# Patient Record
Sex: FEMALE | Race: BLACK OR AFRICAN AMERICAN | NOT HISPANIC OR LATINO | Employment: FULL TIME | URBAN - METROPOLITAN AREA
[De-identification: names, ages, dates, MRNs, and addresses within clinical notes are randomized per-mention and may not be internally consistent; named-entity substitution may affect disease eponyms.]

---

## 2017-03-05 ENCOUNTER — HOSPITAL ENCOUNTER (EMERGENCY)
Facility: HOSPITAL | Age: 47
Discharge: HOME | End: 2017-03-05
Attending: EMERGENCY MEDICINE
Payer: MEDICAID

## 2017-03-05 VITALS
DIASTOLIC BLOOD PRESSURE: 84 MMHG | TEMPERATURE: 98 F | OXYGEN SATURATION: 95 % | RESPIRATION RATE: 20 BRPM | BODY MASS INDEX: 47.09 KG/M2 | HEART RATE: 112 BPM | SYSTOLIC BLOOD PRESSURE: 176 MMHG | HEIGHT: 66 IN | WEIGHT: 293 LBS

## 2017-03-05 DIAGNOSIS — J20.9 ACUTE BRONCHITIS, UNSPECIFIED ORGANISM: Primary | ICD-10-CM

## 2017-03-05 PROCEDURE — 99283 EMERGENCY DEPT VISIT LOW MDM: CPT

## 2017-03-05 RX ORDER — ALBUTEROL SULFATE 90 UG/1
2 AEROSOL, METERED RESPIRATORY (INHALATION) EVERY 4 HOURS PRN
Qty: 1 INHALER | Refills: 0 | Status: SHIPPED | OUTPATIENT
Start: 2017-03-05 | End: 2021-11-01 | Stop reason: SDUPTHER

## 2017-03-05 RX ORDER — DOXYCYCLINE 100 MG/1
100 CAPSULE ORAL 2 TIMES DAILY
Qty: 20 CAPSULE | Refills: 0 | Status: SHIPPED | OUTPATIENT
Start: 2017-03-05 | End: 2017-03-15

## 2017-03-05 RX ORDER — PREDNISONE 20 MG/1
40 TABLET ORAL DAILY
Qty: 10 TABLET | Refills: 0 | Status: SHIPPED | OUTPATIENT
Start: 2017-03-05 | End: 2017-03-10

## 2017-03-05 RX ORDER — CODEINE PHOSPHATE AND GUAIFENESIN 10; 100 MG/5ML; MG/5ML
5 SOLUTION ORAL EVERY 6 HOURS PRN
Qty: 180 ML | Refills: 0 | Status: SHIPPED | OUTPATIENT
Start: 2017-03-05 | End: 2017-12-03

## 2017-03-05 NOTE — ED PROVIDER NOTES
SCRIBE #1 NOTE: I, Samantha Amezquita, am scribing for, and in the presence of, Ashely Muniz MD. I have scribed the entire note.      History      Chief Complaint   Patient presents with    Cough     reports having dry cough, left ear pain, sinus pressure to head        Review of patient's allergies indicates:   Allergen Reactions    Bactrim [sulfamethoxazole-trimethoprim] Hives        HPI   HPI    3/5/2017, 1:21 AM   History obtained from the patient      History of Present Illness: Laura Vora is a 46 y.o. female patient who presents to the Emergency Department for cough which onset gradually for approximately 4 days. Symptoms are constant and moderate in severity. No mitigating or exacerbating factors reported. Associated sxs include congestion, wheezing, sinus pressure, rhinorrhea, and fever. Patient denies any CP, diaphoresis, abd pain, N/V/D, and all other sxs at this time. Pt reports using inhaler. No prior Tx. No further complaints or concerns at this time.         Arrival mode: Personal vehicle    PCP: JOVON Aviles       Past Medical History:  Past Medical History:   Diagnosis Date    Diverticulitis     HTN (hypertension)     Obese     Tobacco use        Past Surgical History:  Past Surgical History:   Procedure Laterality Date    APPENDECTOMY      CHOLECYSTECTOMY      HYSTERECTOMY           Family History:  Family History   Problem Relation Age of Onset    Hypertension         Social History:  Social History     Social History Main Topics    Smoking status: Current Every Day Smoker     Packs/day: 1.00     Last attempt to quit: 1/16/2015    Smokeless tobacco: Never Used    Alcohol use Yes    Drug use: No    Sexual activity: Unknown       ROS   Review of Systems   Constitutional: Positive for fever.   HENT: Positive for congestion, rhinorrhea and sinus pressure. Negative for sore throat.    Respiratory: Positive for cough and wheezing. Negative for shortness of breath.   "  Cardiovascular: Negative for chest pain.   Gastrointestinal: Negative for abdominal pain, diarrhea, nausea and vomiting.   Genitourinary: Negative for dysuria.   Musculoskeletal: Negative for back pain.   Skin: Negative for rash.   Neurological: Negative for weakness.   Hematological: Does not bruise/bleed easily.   All other systems reviewed and are negative.      Physical Exam    Initial Vitals   BP Pulse Resp Temp SpO2   03/05/17 0016 03/05/17 0016 03/05/17 0016 03/05/17 0016 03/05/17 0016   176/84 112 20 98.2 °F (36.8 °C) 95 %      Physical Exam  Nursing Notes and Vital Signs Reviewed.  Constitutional: Patient is in no acute distress. Awake and alert. Well-developed and well-nourished.  Head: Atraumatic. Normocephalic.  Eyes: PERRL. EOM intact. Conjunctivae are not pale. No scleral icterus.  ENT: Mucous membranes are moist. Oropharynx is clear and symmetric.    Neck: Supple. Full ROM. No lymphadenopathy.  Cardiovascular: Tachycardic. Regular rhythm. No murmurs, rubs, or gallops. Distal pulses are 2+ and symmetric.  Pulmonary/Chest: No respiratory distress. Clear to auscultation bilaterally. No rales or rhonchi. Slight wheezing.  Abdominal: Soft and non-distended.  There is no tenderness.  No rebound, guarding, or rigidity. Good bowel sounds.  Genitourinary: No CVA tenderness  Musculoskeletal: Moves all extremities. No obvious deformities. No edema. No calf tenderness.  Skin: Warm and dry.  Neurological:  Alert, awake, and appropriate.  Normal speech.  No acute focal neurological deficits are appreciated.  Psychiatric: Normal affect. Good eye contact. Appropriate in content.    ED Course    Procedures  ED Vital Signs:  Vitals:    03/05/17 0016   BP: (!) 176/84   Pulse: (!) 112   Resp: 20   Temp: 98.2 °F (36.8 °C)   TempSrc: Oral   SpO2: 95%   Weight: 136.1 kg (300 lb)   Height: 5' 6" (1.676 m)              The Emergency Provider reviewed the vital signs and test results, which are outlined above.    ED " Discussion     1:25 AM: Discussed with pt all pertinent ED information and results. Discussed pt dx and plan of tx. Gave pt all f/u and return to the ED instructions. All questions and concerns were addressed at this time. Pt expresses understanding of information and instructions, and is comfortable with plan to discharge. Pt is stable for discharge.    Driving or other activities under influence of medications - Patient and/or family/caretaker was given a prescription for, or instructed to use a medicine that may impair ability to drive, operate machinery, or participate in other potentially dangerous activities.  Patient was instructed not to participate in these activities while under the influence of these medications.        ED Medication(s):  Medications - No data to display    Discharge Medication List as of 3/5/2017  1:22 AM      START taking these medications    Details   albuterol 90 mcg/actuation inhaler Inhale 2 puffs into the lungs every 4 (four) hours as needed for Wheezing., Starting 3/5/2017, Until Mon 3/5/18, Print      doxycycline (VIBRAMYCIN) 100 MG Cap Take 1 capsule (100 mg total) by mouth 2 (two) times daily., Starting 3/5/2017, Until Wed 3/15/17, Print      predniSONE (DELTASONE) 20 MG tablet Take 2 tablets (40 mg total) by mouth once daily., Starting 3/5/2017, Until Fri 3/10/17, Print             Follow-up Information     Follow up with JOVON Aviles In 2 days.    Specialty:  Family Medicine    Contact information:    [address]  [place] [address]  [phone]          Follow up with [facility] .    Specialty:  Emergency Medicine    Why:  As needed, If symptoms worsen    Contact information:    [address]  [facility] [address] [phone]            Medical Decision Making              Scribe Attestation:   Scribe #1: I performed the above scribed service and the documentation accurately describes the  services I performed. I attest to the accuracy of the note.    Attending:   Physician Attestation Statement for Scribe #1: I, Ashely Muniz MD, personally performed the services described in this documentation, as scribed by Samantha Amezquita, in my presence, and it is both accurate and complete.          Clinical Impression       ICD-10-CM ICD-9-CM   1. Acute bronchitis, unspecified organism J20.9 466.0       Disposition:   Disposition: Discharged  Condition: Stable         Ashely Muniz MD  03/05/17 0605

## 2017-03-05 NOTE — ED AVS SNAPSHOT
[facility] -   [id number] [place] [id number]               Laura Vora   3/5/2017 12:01 AM   ED    Description:  Female : 1970   Department:  [facility] -            Your Care was Coordinated By:     Provider Role From To    Ashely Muniz MD Attending Provider 17 0121 --      Reason for Visit     Cough           Diagnoses this Visit        Comments    Acute bronchitis, unspecified organism    -  Primary       ED Disposition     ED Disposition Condition Comment    Discharge             To Do List           Follow-up Information     Follow up with JOVON Aviles In 2 days.    Specialty:  Family Medicine    Contact information:    [address] [facility]  [place] [address]  [phone]          Follow up with [facility].    Specialty:  Emergency Medicine    Why:  As needed, If symptoms worsen    Contact information:    [address] [place] [id number] [phone]       These Medications        Disp Refills Start End    doxycycline (VIBRAMYCIN) 100 MG Cap 20 capsule 0 3/5/2017 3/15/2017    Take 1 capsule (100 mg total) by mouth 2 (two) times daily. - Oral    predniSONE (DELTASONE) 20 MG tablet 10 tablet 0 3/5/2017 3/10/2017    Take 2 tablets (40 mg total) by mouth once daily. - Oral    albuterol 90 mcg/actuation inhaler 1 Inhaler 0 3/5/2017 3/5/2018    Inhale 2 puffs into the lungs every 4 (four) hours as needed for Wheezing. - Inhalation      [facility]     [facility] -  Assistance  Registered nurses in the [facility] provide clinical advisement, health education, appointment booking, and other advisory services.  Call for this free service at [phone].             Medications           Message regarding Medications     Verify the changes and/or additions to your medication regime listed below are the same as  "discussed with your clinician today.  If any of these changes or additions are incorrect, please notify your healthcare provider.        START taking these NEW medications        Refills    doxycycline (VIBRAMYCIN) 100 MG Cap 0    Sig: Take 1 capsule (100 mg total) by mouth 2 (two) times daily.    Class: Print    Route: Oral    predniSONE (DELTASONE) 20 MG tablet 0    Sig: Take 2 tablets (40 mg total) by mouth once daily.    Class: Print    Route: Oral    albuterol 90 mcg/actuation inhaler 0    Sig: Inhale 2 puffs into the lungs every 4 (four) hours as needed for Wheezing.    Class: Print    Route: Inhalation      STOP taking these medications     hydrocodone-acetaminophen 10-325mg (NORCO)  mg Tab Take 1 tablet by mouth every 4 (four) hours as needed.           Verify that the below list of medications is an accurate representation of the medications you are currently taking.  If none reported, the list may be blank. If incorrect, please contact your healthcare provider. Carry this list with you in case of emergency.           Current Medications     albuterol 90 mcg/actuation inhaler Inhale 2 puffs into the lungs every 4 (four) hours as needed for Wheezing.    doxycycline (VIBRAMYCIN) 100 MG Cap Take 1 capsule (100 mg total) by mouth 2 (two) times daily.    lisinopril (PRINIVIL,ZESTRIL) 40 MG tablet Take 40 mg by mouth once daily.    ondansetron (ZOFRAN) 4 MG tablet Take 1 tablet (4 mg total) by mouth every 8 (eight) hours as needed.    predniSONE (DELTASONE) 20 MG tablet Take 2 tablets (40 mg total) by mouth once daily.           Clinical Reference Information           Your Vitals Were     BP Pulse Temp Resp Height Weight    176/84 (BP Location: Right arm, Patient Position: Sitting) 112 98.2 °F (36.8 °C) (Oral) 20 5' 6" (1.676 m) 136.1 kg (300 lb)    Last Period SpO2 BMI          09/05/2014 (Exact Date) 95% 48.42 kg/m2        Allergies as of 3/5/2017        Reactions    Bactrim " [Sulfamethoxazole-trimethoprim] Hives      Immunizations Administered on Date of Encounter - 3/5/2017     None      ED Micro, Lab, POCT     None      ED Imaging Orders     None        Discharge Instructions         Bronchitis, Antibiotic Treatment (Adult)    Bronchitis is an infection of the air passages (bronchial tubes) in your lungs. It often occurs when you have a cold. This illness is contagious during the first few days and is spread through the air by coughing and sneezing, or by direct contact (touching the sick person and then touching your own eyes, nose, or mouth).  Symptoms of bronchitis include cough with mucus (phlegm) and low-grade fever. Bronchitis usually lasts 7 to 14 days. Mild cases can be treated with simple home remedies. More severe infection is treated with an antibiotic.  Home care  Follow these guidelines when caring for yourself at home:  · If your symptoms are severe, rest at home for the first 2 to 3 days. When you go back to your usual activities, don't let yourself get too tired.  · Do not smoke. Also avoid being exposed to secondhand smoke.  · You may use over-the-counter medicines to control fever or pain, unless another medicine was prescribed. (Note: If you have chronic liver or kidney disease or have ever had a stomach ulcer or gastrointestinal bleeding, talk with your healthcare provider before using these medicines. Also talk to your provider if you are taking medicine to prevent blood clots.) Aspirin should never be given to anyone younger than 18 years of age who is ill with a viral infection or fever. It may cause severe liver or brain damage.  · Your appetite may be poor, so a light diet is fine. Avoid dehydration by drinking 6 to 8 glasses of fluids per day (such as water, soft drinks, sports drinks, juices, tea, or soup). Extra fluids will help loosen secretions in the nose and lungs.  · Over-the-counter cough, cold, and sore-throat medicines will not shorten the length of  the illness, but they may be helpful to reduce symptoms. (Note: Do not use decongestants if you have high blood pressure.)  · Finish all antibiotic medicine. Do this even if you are feeling better after only a few days.  Follow-up care  Follow up with your healthcare provider, or as advised. If you had an X-ray or ECG (electrocardiogram), a specialist will review it. You will be notified of any new findings that may affect your care.  Note: If you are age 65 or older, or if you have a chronic lung disease or condition that affects your immune system, or you smoke, talk to your healthcare provider about having pneumococcal vaccinations and a yearly influenza vaccination (flu shot).  When to seek medical advice  Call your healthcare provider right away if any of these occur:  · Fever of 100.4°F (38°C) or higher  · Coughing up increased amounts of colored sputum  · Weakness, drowsiness, headache, facial pain, ear pain, or a stiff neck  Call 911, or get immediate medical care  Contact emergency services right away if any of these occur.  · Coughing up blood  · Worsening weakness, drowsiness, headache, or stiff neck  · Trouble breathing, wheezing, or pain with breathing  Date Last Reviewed: 9/13/2015  © 0699-4732 [employer]. [address] [place] [address], [place] [address]. All rights reserved. This information is not intended as a substitute for professional medical care. Always follow your healthcare professional's instructions.          Bronchitis with Wheezing (Viral or Bacterial: Adult)    Bronchitis is an infection of the air passages. It often occurs during a cold and is usually caused by a virus. Symptoms include cough with mucus (phlegm) and low-grade fever. This illness is contagious during the first few days and is spread through the air by coughing and sneezing, or by direct contact (touching the sick person and then touching your own eyes, nose, or mouth).  If there is a lot of inflammation, air flow  is restricted. The air passages may also go into spasm, especially if you have asthma. This causes wheezing and difficulty breathing even in people who do not have asthma.  Bronchitis usually lasts 7 to 14 days. The wheezing should improve with treatment during the first week. An inhaler is often prescribed to relax the air passages and stop wheezing. Antibiotics will be prescribed if your doctor thinks there is also a secondary bacterial infection.  Home care  · If symptoms are severe, rest at home for the first 2 to 3 days. When you go back to your usual activities, don't let yourself get too tired.  · Do not smoke. Also avoid being exposed to secondhand smoke.  · You may use over-the-counter medicine to control fever or pain, unless another medicine was prescribed. Note: If you have chronic liver or kidney disease or have ever had a stomach ulcer or gastrointestinal bleeding, talk with your healthcare provider before using these medicines. Also talk to your provider if you are taking medicine to prevent blood clots.) Aspirin should never be given to anyone younger than 18 years of age who is ill with a viral infection or fever. It may cause severe liver or brain damage.  · Your appetite may be poor, so a light diet is fine. Avoid dehydration by drinking 6 to 8 glasses of fluids per day (such as water, soft drinks, sports drinks, juices, tea, or soup). Extra fluids will help loosen secretions in the nose and lungs.  · Over-the-counter cough, cold, and sore-throat medicines will not shorten the length of the illness, but they may be helpful to reduce symptoms. (Note: Do not use decongestants if you have high blood pressure.)  · If you were given an inhaler, use it exactly as directed. If you need to use it more often than prescribed, your condition may be worsening. If this happens, contact your healthcare provider.  · If prescribed, finish all antibiotic medicine, even if you are feeling better after only a few  days.  Follow-up care  Follow up with your healthcare provider, or as advised. If you had an X-ray or ECG (electrocardiogram), a specialist will review it. You will be notified of any new findings that may affect your care.  Note: If you are age 65 or older, or if you have a chronic lung disease or condition that affects your immune system, or you smoke, talk to your healthcare provider about having a pneumococcal vaccinations and a yearly influenza vaccination (flu shot).  When to seek medical advice  Call your healthcare provider right away if any of these occur:  · Fever of 100.4°F (38°C) or higher  · Coughing up increasing amounts of colored sputum  · Weakness, drowsiness, headache, facial pain, ear pain, or a stiff neck  Call 911, or get immediate medical care  Contact emergency services right away if any of these occur.  · Coughing up blood  · Worsening weakness, drowsiness, headache, or stiff neck  · Increased wheezing not helped with medication, shortness of breath, or pain with breathing  Date Last Reviewed: 9/13/2015  © 7540-5765 [employer]. [address] [place] [address]. All rights reserved. This information is not intended as a substitute for professional medical care. Always follow your healthcare professional's instructions.          [facility] Sign-Up     Activating your [facility] account is as easy as 1-2-3!     1) Visit [email or web address], select Sign Up Now, enter this activation code and your date of birth, then select Next.  [email or web address]  Expires: 4/19/2017  1:22 AM      2) Create a username and password to use when you visit [facility] in the future and select a security question in case you lose your password and select Next.    3) Enter your e-mail address and click Sign Up!    Additional Information  If you have questions, please e-mail [email or web address] or call [phone] to talk to our [facility] staff. Remember, [facility] is NOT to be used for urgent needs.  For medical emergencies, dial 911.         Smoking Cessation     If you would like to quit smoking:   You may be eligible for free services if you are a [place] resident and started smoking cigarettes before September 1, 1988.  Call the Smoking Cessation [employer] (SCT) toll free at (379) 022-5784 or (273) 741-2322.   Call 1-800-QUIT-NOW if you do not meet the above criteria.             [facility] complies with applicable Federal civil rights laws and does not discriminate on the basis of race, color, national origin, age, disability, or sex.        Language Assistance Services     ATTENTION: Language assistance services are available, free of charge. Please call [phone].      ATENCIÓN: Si habla español, tiene a glover disposición servicios gratuitos de asistencia lingüística. Llame al [phone].     CHÚ Ý: N?u b?n nói Ti?ng Vi?t, có các d?ch v? h? tr? ngôn ng? mi?n phí dành cho b?n. G?i s? [phone].

## 2017-03-05 NOTE — DISCHARGE INSTRUCTIONS
Bronchitis, Antibiotic Treatment (Adult)    Bronchitis is an infection of the air passages (bronchial tubes) in your lungs. It often occurs when you have a cold. This illness is contagious during the first few days and is spread through the air by coughing and sneezing, or by direct contact (touching the sick person and then touching your own eyes, nose, or mouth).  Symptoms of bronchitis include cough with mucus (phlegm) and low-grade fever. Bronchitis usually lasts 7 to 14 days. Mild cases can be treated with simple home remedies. More severe infection is treated with an antibiotic.  Home care  Follow these guidelines when caring for yourself at home:  · If your symptoms are severe, rest at home for the first 2 to 3 days. When you go back to your usual activities, don't let yourself get too tired.  · Do not smoke. Also avoid being exposed to secondhand smoke.  · You may use over-the-counter medicines to control fever or pain, unless another medicine was prescribed. (Note: If you have chronic liver or kidney disease or have ever had a stomach ulcer or gastrointestinal bleeding, talk with your healthcare provider before using these medicines. Also talk to your provider if you are taking medicine to prevent blood clots.) Aspirin should never be given to anyone younger than 18 years of age who is ill with a viral infection or fever. It may cause severe liver or brain damage.  · Your appetite may be poor, so a light diet is fine. Avoid dehydration by drinking 6 to 8 glasses of fluids per day (such as water, soft drinks, sports drinks, juices, tea, or soup). Extra fluids will help loosen secretions in the nose and lungs.  · Over-the-counter cough, cold, and sore-throat medicines will not shorten the length of the illness, but they may be helpful to reduce symptoms. (Note: Do not use decongestants if you have high blood pressure.)  · Finish all antibiotic medicine. Do this even if you are feeling better after only a  few days.  Follow-up care  Follow up with your healthcare provider, or as advised. If you had an X-ray or ECG (electrocardiogram), a specialist will review it. You will be notified of any new findings that may affect your care.  Note: If you are age 65 or older, or if you have a chronic lung disease or condition that affects your immune system, or you smoke, talk to your healthcare provider about having pneumococcal vaccinations and a yearly influenza vaccination (flu shot).  When to seek medical advice  Call your healthcare provider right away if any of these occur:  · Fever of 100.4°F (38°C) or higher  · Coughing up increased amounts of colored sputum  · Weakness, drowsiness, headache, facial pain, ear pain, or a stiff neck  Call 911, or get immediate medical care  Contact emergency services right away if any of these occur.  · Coughing up blood  · Worsening weakness, drowsiness, headache, or stiff neck  · Trouble breathing, wheezing, or pain with breathing  Date Last Reviewed: 9/13/2015 © 2000-2016 [employer]. [address] [place] [address]. All rights reserved. This information is not intended as a substitute for professional medical care. Always follow your healthcare professional's instructions.          Bronchitis with Wheezing (Viral or Bacterial: Adult)    Bronchitis is an infection of the air passages. It often occurs during a cold and is usually caused by a virus. Symptoms include cough with mucus (phlegm) and low-grade fever. This illness is contagious during the first few days and is spread through the air by coughing and sneezing, or by direct contact (touching the sick person and then touching your own eyes, nose, or mouth).  If there is a lot of inflammation, air flow is restricted. The air passages may also go into spasm, especially if you have asthma. This causes wheezing and difficulty breathing even in people who do not have asthma.  Bronchitis usually lasts 7 to 14  days. The wheezing should improve with treatment during the first week. An inhaler is often prescribed to relax the air passages and stop wheezing. Antibiotics will be prescribed if your doctor thinks there is also a secondary bacterial infection.  Home care  · If symptoms are severe, rest at home for the first 2 to 3 days. When you go back to your usual activities, don't let yourself get too tired.  · Do not smoke. Also avoid being exposed to secondhand smoke.  · You may use over-the-counter medicine to control fever or pain, unless another medicine was prescribed. Note: If you have chronic liver or kidney disease or have ever had a stomach ulcer or gastrointestinal bleeding, talk with your healthcare provider before using these medicines. Also talk to your provider if you are taking medicine to prevent blood clots.) Aspirin should never be given to anyone younger than 18 years of age who is ill with a viral infection or fever. It may cause severe liver or brain damage.  · Your appetite may be poor, so a light diet is fine. Avoid dehydration by drinking 6 to 8 glasses of fluids per day (such as water, soft drinks, sports drinks, juices, tea, or soup). Extra fluids will help loosen secretions in the nose and lungs.  · Over-the-counter cough, cold, and sore-throat medicines will not shorten the length of the illness, but they may be helpful to reduce symptoms. (Note: Do not use decongestants if you have high blood pressure.)  · If you were given an inhaler, use it exactly as directed. If you need to use it more often than prescribed, your condition may be worsening. If this happens, contact your healthcare provider.  · If prescribed, finish all antibiotic medicine, even if you are feeling better after only a few days.  Follow-up care  Follow up with your healthcare provider, or as advised. If you had an X-ray or ECG (electrocardiogram), a specialist will review it. You will be notified of any new findings that may  affect your care.  Note: If you are age 65 or older, or if you have a chronic lung disease or condition that affects your immune system, or you smoke, talk to your healthcare provider about having a pneumococcal vaccinations and a yearly influenza vaccination (flu shot).  When to seek medical advice  Call your healthcare provider right away if any of these occur:  · Fever of 100.4°F (38°C) or higher  · Coughing up increasing amounts of colored sputum  · Weakness, drowsiness, headache, facial pain, ear pain, or a stiff neck  Call 911, or get immediate medical care  Contact emergency services right away if any of these occur.  · Coughing up blood  · Worsening weakness, drowsiness, headache, or stiff neck  · Increased wheezing not helped with medication, shortness of breath, or pain with breathing  Date Last Reviewed: 9/13/2015  © 7120-3931 [employer] [address] [place] [address]. All rights reserved. This information is not intended as a substitute for professional medical care. Always follow your healthcare professional's instructions.

## 2017-07-08 ENCOUNTER — HOSPITAL ENCOUNTER (EMERGENCY)
Facility: HOSPITAL | Age: 47
Discharge: HOME | End: 2017-07-08
Attending: EMERGENCY MEDICINE
Payer: MEDICAID

## 2017-07-08 VITALS
RESPIRATION RATE: 18 BRPM | TEMPERATURE: 99 F | OXYGEN SATURATION: 99 % | DIASTOLIC BLOOD PRESSURE: 79 MMHG | BODY MASS INDEX: 46.77 KG/M2 | SYSTOLIC BLOOD PRESSURE: 148 MMHG | HEART RATE: 89 BPM | WEIGHT: 291 LBS | HEIGHT: 66 IN

## 2017-07-08 DIAGNOSIS — H66.002 ACUTE SUPPURATIVE OTITIS MEDIA OF LEFT EAR WITHOUT SPONTANEOUS RUPTURE OF TYMPANIC MEMBRANE, RECURRENCE NOT SPECIFIED: Primary | ICD-10-CM

## 2017-07-08 PROCEDURE — 99283 EMERGENCY DEPT VISIT LOW MDM: CPT | Mod: 25

## 2017-07-08 PROCEDURE — 25000003 PHARM REV CODE 250: Performed by: EMERGENCY MEDICINE

## 2017-07-08 PROCEDURE — 63600175 PHARM REV CODE 636 W HCPCS: Performed by: EMERGENCY MEDICINE

## 2017-07-08 PROCEDURE — 96372 THER/PROPH/DIAG INJ SC/IM: CPT

## 2017-07-08 RX ORDER — TRAMADOL HYDROCHLORIDE 50 MG/1
50 TABLET ORAL EVERY 6 HOURS PRN
Qty: 12 TABLET | Refills: 0 | Status: SHIPPED | OUTPATIENT
Start: 2017-07-08 | End: 2017-07-18

## 2017-07-08 RX ORDER — CEFTRIAXONE 1 G/1
1 INJECTION, POWDER, FOR SOLUTION INTRAMUSCULAR; INTRAVENOUS
Status: COMPLETED | OUTPATIENT
Start: 2017-07-08 | End: 2017-07-08

## 2017-07-08 RX ORDER — AMOXICILLIN AND CLAVULANATE POTASSIUM 875; 125 MG/1; MG/1
1 TABLET, FILM COATED ORAL 2 TIMES DAILY
Qty: 14 TABLET | Refills: 0 | Status: SHIPPED | OUTPATIENT
Start: 2017-07-08 | End: 2017-11-05 | Stop reason: ALTCHOICE

## 2017-07-08 RX ORDER — HYDROCODONE BITARTRATE AND ACETAMINOPHEN 10; 325 MG/1; MG/1
1 TABLET ORAL
Status: COMPLETED | OUTPATIENT
Start: 2017-07-08 | End: 2017-07-08

## 2017-07-08 RX ADMIN — CEFTRIAXONE 1 G: 1 INJECTION, POWDER, FOR SOLUTION INTRAMUSCULAR; INTRAVENOUS at 09:07

## 2017-07-08 RX ADMIN — HYDROCODONE BITARTRATE AND ACETAMINOPHEN 1 TABLET: 10; 325 TABLET ORAL at 09:07

## 2017-07-09 NOTE — ED PROVIDER NOTES
SCRIBE #1 NOTE: I, Chuck Yung, am scribing for, and in the presence of, Jean Carlos Narvaez Jr., MD. I have scribed the entire note.      History      Chief Complaint   Patient presents with    Headache     left sided headache x 6 hrs with URI symptoms        Review of patient's allergies indicates:   Allergen Reactions    Bactrim [sulfamethoxazole-trimethoprim] Hives        HPI   HPI    7/8/2017, 8:59 PM   History obtained from the patient      History of Present Illness: Laura Vora is a 47 y.o. female patient who presents to the Emergency Department for left sided ear pain which onset gradually 6 hours ago. Symptoms are constant and moderate in severity.  No mitigating or exacerbating factors reported. Associated sxs include left sided HA, cough, and epistaxis. Patient denies any fever, chills, n/v/d, sore throat, rhinorrhea, congestion, lightheadedness, dizziness, and all other sxs at this time. No further complaints or concerns at this time.         Arrival mode: Personal vehicle      PCP: JOVON Aviles       Past Medical History:  Past Medical History:   Diagnosis Date    Diverticulitis     HTN (hypertension)     Obese     Tobacco use        Past Surgical History:  Past Surgical History:   Procedure Laterality Date    APPENDECTOMY      CHOLECYSTECTOMY      HYSTERECTOMY           Family History:  Family History   Problem Relation Age of Onset    Hypertension         Social History:  Social History     Social History Main Topics    Smoking status: Current Every Day Smoker     Packs/day: 1.00     Last attempt to quit: 1/16/2015    Smokeless tobacco: Never Used    Alcohol use Yes    Drug use: No    Sexual activity: Unknown       ROS   Review of Systems   Constitutional: Negative for chills and fever.   HENT: Positive for ear pain (left) and nosebleeds. Negative for congestion, sore throat and trouble swallowing.    Respiratory: Positive for cough. Negative for shortness of breath.     Cardiovascular: Negative for chest pain.   Gastrointestinal: Negative for diarrhea, nausea and vomiting.   Genitourinary: Negative for dysuria.   Musculoskeletal: Negative for back pain.   Skin: Negative for rash.   Neurological: Positive for headaches (left sided). Negative for dizziness, weakness and light-headedness.   Hematological: Does not bruise/bleed easily.       Physical Exam      Initial Vitals [07/08/17 2052]   BP Pulse Resp Temp SpO2   (!) 159/89 110 20 98.5 °F (36.9 °C) 100 %      MAP       112.33          Physical Exam  Nursing Notes and Vital Signs Reviewed.  Constitutional: Patient is in no acute distress. Well-developed and well-nourished.  Head: Atraumatic. Normocephalic.  Eyes: PERRL. EOM intact. Conjunctivae are not pale. No scleral icterus.  Ears: Right TM normal. Erythema to the left TM. No bulging. No effusion or air-fluid levels. No perforation.   Nose: Patent nares. Turbinates are normal. No drainage.   Throat: Moist mucous membranes. Posterior oropharynx is symmetric without erythema. Tonsillar exudate is not present. No trismus. Normal handling of secretions. No stridor.   Neck: Supple. Full ROM. No lymphadenopathy.  Cardiovascular: Regular rate. Regular rhythm. No murmurs, rubs, or gallops. Distal pulses are 2+ and symmetric.  Pulmonary/Chest: No respiratory distress. Clear to auscultation bilaterally. No wheezing, rales, or rhonchi.  Abdominal: Soft and non-distended.  There is no tenderness.  No rebound, guarding, or rigidity.   Musculoskeletal: Moves all extremities. No obvious deformities. No edema.   Skin: Warm and dry.  Neurological:  Alert, awake, and appropriate.  Normal speech.  No acute focal neurological deficits are appreciated.  Psychiatric: Normal affect. Good eye contact. Appropriate in content.    ED Course    Procedures  ED Vital Signs:  Vitals:    07/08/17 2052 07/08/17 2140   BP: (!) 159/89 (!) 148/79   Pulse: 110 89   Resp: 20 18   Temp: 98.5 °F (36.9 °C)   "  TempSrc: Oral    SpO2: 100% 99%   Weight: 132 kg (291 lb)    Height: 5' 6" (1.676 m)                 The Emergency Provider reviewed the vital signs and test results, which are outlined above.    ED Discussion     9:12 PM: Discussed pt dx and plan of tx. Gave pt all f/u and return to the ED instructions. All questions and concerns were addressed at this time. Pt expresses understanding of information and instructions, and is comfortable with plan to discharge. Pt is stable for discharge.        ED Medication(s):  Medications   cefTRIAXone injection 1 g (1 g Intramuscular Given 7/8/17 2114)   hydrocodone-acetaminophen 10-325mg per tablet 1 tablet (1 tablet Oral Given 7/8/17 2114)       Discharge Medication List as of 7/8/2017  9:12 PM      START taking these medications    Details   amoxicillin-clavulanate 875-125mg (AUGMENTIN) 875-125 mg per tablet Take 1 tablet by mouth 2 (two) times daily., Starting Sat 7/8/2017, Print      tramadol (ULTRAM) 50 mg tablet Take 1 tablet (50 mg total) by mouth every 6 (six) hours as needed., Starting Sat 7/8/2017, Until Tue 7/18/2017, Print             Follow-up Information     Cy Romero, APRN. Call in 2 days.    Specialty:  Family Medicine  Contact information:  [address] [facility]  [place] [address]  [phone]                     Medical Decision Making              Scribe Attestation:   Scribe #1: I performed the above scribed service and the documentation accurately describes the services I performed. I attest to the accuracy of the note.    Attending:   Physician Attestation Statement for Scribe #1: I, Jean Carlos Narvaez Jr., MD, personally performed the services described in this documentation, as scribed by Chuck Yung, in my presence, and it is both accurate and complete.          Clinical Impression       ICD-10-CM ICD-9-CM   1. Acute suppurative otitis media of left ear without spontaneous rupture of tympanic membrane, recurrence not " specified H66.002 382.00       Disposition:   Disposition: Discharged  Condition: Stable         Jean Carlos Narvaez Jr., MD  07/09/17 0300

## 2017-09-07 ENCOUNTER — HOSPITAL ENCOUNTER (EMERGENCY)
Facility: HOSPITAL | Age: 47
Discharge: HOME | End: 2017-09-07
Payer: MEDICAID

## 2017-09-07 VITALS
WEIGHT: 291 LBS | HEART RATE: 120 BPM | DIASTOLIC BLOOD PRESSURE: 67 MMHG | HEIGHT: 65 IN | OXYGEN SATURATION: 98 % | RESPIRATION RATE: 18 BRPM | BODY MASS INDEX: 48.48 KG/M2 | TEMPERATURE: 99 F | SYSTOLIC BLOOD PRESSURE: 143 MMHG

## 2017-09-07 DIAGNOSIS — R20.8 PAIN OF SKIN: ICD-10-CM

## 2017-09-07 DIAGNOSIS — L03.311 CELLULITIS OF ABDOMINAL WALL: Primary | ICD-10-CM

## 2017-09-07 PROCEDURE — 99283 EMERGENCY DEPT VISIT LOW MDM: CPT

## 2017-09-07 RX ORDER — CLINDAMYCIN HYDROCHLORIDE 150 MG/1
300 CAPSULE ORAL EVERY 8 HOURS
Qty: 42 CAPSULE | Refills: 0 | Status: SHIPPED | OUTPATIENT
Start: 2017-09-07 | End: 2017-09-14

## 2017-09-07 NOTE — ED PROVIDER NOTES
History      Chief Complaint   Patient presents with    Abscess     R abd       Review of patient's allergies indicates:   Allergen Reactions    Bactrim [sulfamethoxazole-trimethoprim] Hives        HPI   HPI    9/7/2017, 12:45 PM   History obtained from the patient      History of Present Illness: Laura Vora is a 47 y.o. female patient who presents to the Emergency Department for pain over old laparscopy scar fro 2 days.  She says it was red but redness has gone away.  She denies f/n/v.   Symptoms are moderate in severity.     No further complaints or concerns at this time.           PCP: JOVON Aviles       Past Medical History:  Past Medical History:   Diagnosis Date    Diverticulitis     HTN (hypertension)     Obese     Tobacco use          Past Surgical History:  Past Surgical History:   Procedure Laterality Date    APPENDECTOMY      CHOLECYSTECTOMY      HYSTERECTOMY             Family History:  Family History   Problem Relation Age of Onset    Hypertension             Social History:  Social History     Social History Main Topics    Smoking status: Current Every Day Smoker     Packs/day: 1.00     Last attempt to quit: 1/16/2015    Smokeless tobacco: Never Used    Alcohol use Yes    Drug use: No    Sexual activity: Not on file       ROS     Review of Systems   Constitutional: Negative for fever.   HENT: Negative for sore throat.    Respiratory: Negative for shortness of breath.    Cardiovascular: Negative for chest pain.   Gastrointestinal: Negative for nausea.   Genitourinary: Negative for dysuria.   Musculoskeletal: Negative for back pain.   Skin: Negative for rash.   Neurological: Negative for weakness.   Hematological: Does not bruise/bleed easily.   All other systems reviewed and are negative.      Physical Exam      Initial Vitals [09/07/17 1233]   BP Pulse Resp Temp SpO2   (!) 143/67 (!) 120 18 98.7 °F (37.1 °C) 98 %      MAP       92.33         Physical Exam  Vital signs  "and nursing notes reviewed.  Constitutional: Patient is in NAD. Awake and alert. Well-developed and well-nourished.  Head: Atraumatic. Normocephalic.  Eyes: PERRL. EOM intact. Conjunctivae nl. No scleral icterus.  ENT: Mucous membranes are moist. Oropharynx is clear.  Neck: Supple. No JVD. No lymphadenopathy.  No meningismus  Cardiovascular: Regular rate and rhythm. No murmurs, rubs, or gallops. Distal pulses are 2+ and symmetric.  Pulmonary/Chest: No respiratory distress. Clear to auscultation bilaterally. No wheezing, rales, or rhonchi.  Abdominal: Soft. Non-distended. No TTP. No rebound, guarding, or rigidity. Good bowel sounds.  Genitourinary: No CVA tenderness  Musculoskeletal: Moves all extremities. No edema.   Skin: Warm and dry.  Right lower laparoscopy scar, looks healthy, slightly erythematous but possibly from patient rubbing and squeezing it during exam, no induration, fluctuance or heat.  Neurological: Awake and alert. No acute focal neurological deficits are appreciated.  Psychiatric: Normal affect. Good eye contact. Appropriate in content.      ED Course          Procedures  ED Vital Signs:  Vitals:    09/07/17 1233   BP: (!) 143/67   Pulse: (!) 120   Resp: 18   Temp: 98.7 °F (37.1 °C)   TempSrc: Oral   SpO2: 98%   Weight: 132 kg (291 lb)   Height: 5' 5" (1.651 m)                 Imaging Results:  Imaging Results    None            The Emergency Provider reviewed the vital signs and test results, which are outlined above.    ED Discussion             Medication(s) given in the ER:  Medications - No data to display        Follow-up Information     JOVON Aviles In 2 days.    Specialty:  Family Medicine  Contact information:  [address] [facility]  [place] [id number] [phone]             [facility]    Specialty:  Emergency Medicine  Why:  If symptoms worsen  Contact information:  [address]  [facility] " [address]  [phone]                     New Prescriptions    CLINDAMYCIN (CLEOCIN) 150 MG CAPSULE    Take 2 capsules (300 mg total) by mouth every 8 (eight) hours.          Medical Decision Making      Pt requests work excuse.  Instructed to rter if any signs of abscess or cellulitis.    All findings were reviewed with the patient/family in detail.   All remaining questions and concerns were addressed at that time.  Patient/family has been counseled regarding the need for follow-up as well as the indication to return to the emergency room should new or worrisome developments occur.        MDM               Clinical Impression:        ICD-10-CM ICD-9-CM   1. Cellulitis of abdominal wall L03.311 682.2   2. Pain of skin R20.8 782.0             Anitha Tran PA-C  09/07/17 1251

## 2017-10-01 ENCOUNTER — HOSPITAL ENCOUNTER (EMERGENCY)
Facility: HOSPITAL | Age: 47
Discharge: HOME | End: 2017-10-01
Payer: MEDICAID

## 2017-10-01 VITALS
TEMPERATURE: 99 F | DIASTOLIC BLOOD PRESSURE: 78 MMHG | RESPIRATION RATE: 18 BRPM | HEIGHT: 65 IN | BODY MASS INDEX: 48.82 KG/M2 | SYSTOLIC BLOOD PRESSURE: 138 MMHG | OXYGEN SATURATION: 100 % | WEIGHT: 293 LBS | HEART RATE: 100 BPM

## 2017-10-01 DIAGNOSIS — S89.309S: ICD-10-CM

## 2017-10-01 DIAGNOSIS — S62.024K CLOSED NONDISPLACED FRACTURE OF MIDDLE THIRD OF SCAPHOID OF RIGHT WRIST WITH NONUNION, SUBSEQUENT ENCOUNTER: ICD-10-CM
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
## 2017-10-01 PROCEDURE — 29515 APPLICATION SHORT LEG SPLINT: CPT | Mod: 59,LT

## 2017-10-01 PROCEDURE — 90471 IMMUNIZATION ADMIN: CPT | Performed by: REGISTERED NURSE

## 2017-10-01 PROCEDURE — 99283 EMERGENCY DEPT VISIT LOW MDM: CPT | Mod: 25

## 2017-10-01 PROCEDURE — 29125 APPL SHORT ARM SPLINT STATIC: CPT | Mod: LT

## 2017-10-01 PROCEDURE — 90715 TDAP VACCINE 7 YRS/> IM: CPT | Performed by: REGISTERED NURSE

## 2017-10-01 PROCEDURE — 63600175 PHARM REV CODE 636 W HCPCS: Performed by: REGISTERED NURSE

## 2017-10-01 RX ORDER — IBUPROFEN 800 MG/1
800 TABLET ORAL EVERY 6 HOURS PRN
Qty: 20 TABLET | Refills: 0 | Status: SHIPPED | OUTPATIENT
Start: 2017-10-01 | End: 2020-07-31

## 2017-10-01 RX ORDER — HYDROCHLOROTHIAZIDE 25 MG/1
25 TABLET ORAL DAILY
COMMUNITY
End: 2020-07-31

## 2017-10-01 RX ADMIN — CLOSTRIDIUM TETANI TOXOID ANTIGEN (FORMALDEHYDE INACTIVATED), CORYNEBACTERIUM DIPHTHERIAE TOXOID ANTIGEN (FORMALDEHYDE INACTIVATED), BORDETELLA PERTUSSIS TOXOID ANTIGEN (GLUTARALDEHYDE INACTIVATED), BORDETELLA PERTUSSIS FILAMENTOUS HEMAGGLUTININ ANTIGEN (FORMALDEHYDE INACTIVATED), BORDETELLA PERTUSSIS PERTACTIN ANTIGEN, AND BORDETELLA PERTUSSIS FIMBRIAE 2/3 ANTIGEN 0.5 ML: 5; 2; 2.5; 5; 3; 5 INJECTION, SUSPENSION INTRAMUSCULAR at 05:10

## 2017-10-01 NOTE — ED PROVIDER NOTES
SCRIBE #1 NOTE: I, Ny Laurent, am scribing for, and in the presence of, Jose Guadalupe Richardson Jr., DARÍOP. I have scribed the entire note.      History      Chief Complaint   Patient presents with    Wrist Pain     left wrist pain, denies trauma or injury; left ankle swelling and pain       Review of patient's allergies indicates:   Allergen Reactions    Iodine and iodide containing products Hives    Bactrim [sulfamethoxazole-trimethoprim] Hives        HPI   HPI    10/1/2017, 5:03 PM   History obtained from the patient      History of Present Illness: Laura Vora is a 47 y.o. female patient who presents to the Emergency Department for L ankle pain which onset gradually 2 weeks ago. Symptoms are constant and moderate in severity. Pt reports L ankle falling through a wood floor 2 weeks ago and scraping her ankle. Pt reports experiencing L ankle swelling, but eventually the swelling subsided Pt states L ankle has started swelling again. No mitigating or exacerbating factors reported. Associated sxs include L wrist pain. Patient denies any fever, chills, nausea, emesis, paresthesias, extremity weakness/numbness, drainage, syncope, HA, dizziness, CP, SOB,  and all other sxs at this time. Pt is unsure if UTD on tetanus. No further complaints or concerns at this time.         Arrival mode: Personal vehicle     PCP: JOVON Aviles       Past Medical History:  Past Medical History:   Diagnosis Date    Diverticulitis     HTN (hypertension)     Obese     Tobacco use        Past Surgical History:  Past Surgical History:   Procedure Laterality Date    APPENDECTOMY      CHOLECYSTECTOMY      HYSTERECTOMY           Family History:  Family History   Problem Relation Age of Onset    Hypertension         Social History:  Social History     Social History Main Topics    Smoking status: Current Every Day Smoker     Packs/day: 1.00     Last attempt to quit: 1/16/2015    Smokeless tobacco: Never Used    Alcohol use Yes     Drug use: No    Sexual activity: Not given       ROS   Review of Systems   Constitutional: Negative for chills and fever.   HENT: Negative for sore throat.    Respiratory: Negative for shortness of breath.    Cardiovascular: Negative for chest pain.   Gastrointestinal: Negative for nausea and vomiting.   Genitourinary: Negative for dysuria.   Musculoskeletal: Positive for arthralgias (L ankle and L wrist) and joint swelling (L ankle). Negative for back pain.   Skin: Negative for rash.        (-) drainage    Neurological: Negative for dizziness, syncope, weakness, numbness and headaches.        (-) paresthesias   Hematological: Does not bruise/bleed easily.   All other systems reviewed and are negative.    Physical Exam      Initial Vitals [10/01/17 1644]   BP Pulse Resp Temp SpO2   (!) 144/76 110 20 98.5 °F (36.9 °C) 98 %      MAP       98.67          Physical Exam  Nursing Notes and Vital Signs Reviewed.  Constitutional: Patient is in no apparent distress. Well-developed and well-nourished.  Head: Atraumatic. Normocephalic.  Eyes: PERRL. EOM intact. Conjunctivae are not pale. No scleral icterus.  ENT: Mucous membranes are moist. Oropharynx is clear and symmetric.    Neck: Supple. Full ROM. No lymphadenopathy.  Cardiovascular: Regular rate. Regular rhythm. No murmurs, rubs, or gallops. Distal pulses are 2+ and symmetric.  Pulmonary/Chest: No respiratory distress. Clear to auscultation bilaterally. No wheezing, rales, or rhonchi.  Abdominal: Soft and non-distended.  There is no tenderness.  No rebound, guarding, or rigidity. Good bowel sounds.  Genitourinary: No CVA tenderness  Musculoskeletal: Moves all extremities. No calf tenderness.  LEFT Ankle:  Tender to palpation and swelling to L lateral malleolus. Healing scab over area. She is able to bear weight. Intact sensation to light touch. Distal capillary refill takes less than 2 seconds.  PT and DP pulses are 2+ bilaterally. Warm to touch.  LEFT Hand: Pain  with flexion and extension to L wrist. No swelling noted. Radial, median, and ulnar nerves are intact. Radial and ulnar pulses are 2+. Normal capillary refill.  Distal sensation is intact.  Skin: Warm and dry.  Neurological:  Alert, awake, and appropriate.  Normal speech.  No acute focal neurological deficits are appreciated.  Psychiatric: Normal affect. Good eye contact. Appropriate in content.    ED Course    Splint Application  Date/Time: 10/1/2017 6:06 PM  Performed by: MARINO JALLOH JR  Authorized by: MARINO JALLOH JR   Consent Done: Yes  Consent: Verbal consent obtained.  Risks and benefits: risks, benefits and alternatives were discussed  Consent given by: patient  Patient understanding: patient states understanding of the procedure being performed  Patient consent: the patient's understanding of the procedure matches consent given  Procedure consent: procedure consent matches procedure scheduled  Relevant documents: relevant documents present and verified  Patient identity confirmed: , name and verbally with patient  Location details: left leg  Splint type: short leg (posterior short leg with stirrup )  Post-procedure: The splinted body part was neurovascularly unchanged following the procedure.  Patient tolerance: Patient tolerated the procedure well with no immediate complications    Splint Application  Date/Time: 10/1/2017 6:07 PM  Performed by: MARINO JALLOH JR  Authorized by: MARINO JALLOH JR   Consent Done: Yes  Consent: Verbal consent obtained.  Risks and benefits: risks, benefits and alternatives were discussed  Consent given by: patient  Patient understanding: patient states understanding of the procedure being performed  Patient consent: the patient's understanding of the procedure matches consent given  Procedure consent: procedure consent matches procedure scheduled  Relevant documents: relevant documents present and verified  Patient identity confirmed: verbally  "with patient, name and   Location details: left wrist  Splint type: thumb spica  Post-procedure: The splinted body part was neurovascularly unchanged following the procedure.  Patient tolerance: Patient tolerated the procedure well with no immediate complications        ED Vital Signs:  Vitals:    10/01/17 1644 10/01/17 1818   BP: (!) 144/76 138/78   Pulse: 110 100   Resp: 20 18   Temp: 98.5 °F (36.9 °C) 98.6 °F (37 °C)   TempSrc: Oral Oral   SpO2: 98% 100%   Weight: 135.2 kg (298 lb)    Height: 5' 5" (1.651 m)        Imaging Results:  Imaging Results          X-Ray Ankle Complete Left (Final result)  Result time 10/01/17 17:34:39    Final result by Robel Amin MD (10/01/17 17:34:39)                 Impression:      Lateral edema.  Suspect tiny nondisplaced avulsion fracture fragments of the tip of the fibula and adjacent talus.    Additionally, tiny 1-2 mm density inferior to the tip of the medial malleolus could be soft tissue calcification or old trauma.  Tiny acute avulsion fracture fragment here also a consideration      Electronically signed by: ROBEL AMIN MD  Date:     10/01/17  Time:    17:34              Narrative:    EXAM: XR ANKLE COMPLETE 3 VIEW LEFT    CLINICAL HISTORY:  W19.XXXA Unspecified fall, initial encounter;    COMPARISON EXAMS: none    FINDINGS:  Significant lateral edema.  A subtle lucency at the very tip of the fibula as well as on the adjacent/nearby lateral surface of the talus.                             X-Ray Wrist Complete Left (Final result)  Result time 10/01/17 17:32:06    Final result by Robel Amin MD (10/01/17 17:32:06)                 Impression:      Question scaphoid nondisplaced fracture.      Electronically signed by: ROBEL AMIN MD  Date:     10/01/17  Time:    17:32              Narrative:    EXAM: XR WRIST COMPLETE 3 VIEWS LEFT    CLINICAL HISTORY:  W19.XXXA Unspecified fall, initial encounter    COMPARISON EXAMS: none    FINDINGS:  Subtle irregularity of the " cortical surface of the mid scaphoid with suggestion of a subtle lucency.  No significant arthritic changes.                                      The Emergency Provider reviewed the vital signs and test results, which are outlined above.    ED Discussion   5:58 PM: Reassessed pt at this time.  Pt states her condition has improved at this time. Discussed with pt all pertinent ED information and results. Discussed pt dx and plan of tx. Gave pt all f/u and return to the ED instructions. All questions and concerns were addressed at this time. Pt expresses understanding of information and instructions, and is comfortable with plan to discharge. Pt is stable for discharge.    I discussed with patient and/or family/caretaker that evaluation in the ED does not suggest any emergent or life threatening medical conditions requiring immediate intervention beyond what was provided in the ED, and I believe patient is safe for discharge.  Regardless, an unremarkable evaluation in the ED does not preclude the development or presence of a serious of life threatening condition. As such, patient was instructed to return immediately for any worsening or change in current symptoms.'    I discussed with patient and/or family/caretaker that negative X-ray does not rule out occult fracture or other soft tissue injury.  Persistent pain greater than 7-10 days or increased pain requires follow up, specifically with orthopedics.       ED Medication(s):  Medications   Tdap vaccine injection 0.5 mL (0.5 mLs Intramuscular Given 10/1/17 1720)       Discharge Medication List as of 10/1/2017  6:02 PM          Follow-up Information     [facility]. Call in 2 days.    Specialty:  Orthopedic Surgery  Contact information:  [address] [facility] [address] [phone]                     Medical Decision Making    Medical Decision Making:   Clinical Tests:   Radiological Study: Reviewed and Ordered           Scribe Attestation:    Scribe #1: I performed the above scribed service and the documentation accurately describes the services I performed. I attest to the accuracy of the note.    Attending:   Physician Attestation Statement for Scribe #1: I, Jose Guadalupe Richardson Jr., FARAZ, personally performed the services described in this documentation, as scribed by Ny Laurent, in my presence, and it is both accurate and complete.         Attending Attestation:     Physician Attestation Statement for NP/PA:   I discussed this assessment and plan of this patient with the NP/PA, but I did not personally examine the patient. The face to face encounter was performed by the NP/PA.              Clinical Impression       ICD-10-CM ICD-9-CM   1. Fall W19.XXXA E888.9   2. Displaced physeal fracture of distal end of fibula, unspecified laterality, sequela S89.309S 905.4   3. 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

## 2017-11-05 ENCOUNTER — HOSPITAL ENCOUNTER (INPATIENT)
Facility: HOSPITAL | Age: 47
LOS: 4 days | Discharge: HOME | DRG: 872 | End: 2017-11-09
Attending: INTERNAL MEDICINE | Admitting: EMERGENCY MEDICINE
Payer: MEDICAID

## 2017-11-05 DIAGNOSIS — A41.9 SEPSIS, DUE TO UNSPECIFIED ORGANISM: Primary | ICD-10-CM

## 2017-11-05 DIAGNOSIS — A41.9 SEVERE SEPSIS: ICD-10-CM

## 2017-11-05 DIAGNOSIS — R65.20 SEVERE SEPSIS: ICD-10-CM

## 2017-11-05 DIAGNOSIS — K57.32 DIVERTICULITIS OF LARGE INTESTINE WITHOUT PERFORATION OR ABSCESS WITHOUT BLEEDING: ICD-10-CM

## 2017-11-05 DIAGNOSIS — R78.81 GRAM-NEGATIVE BACTEREMIA: ICD-10-CM

## 2017-11-05 DIAGNOSIS — R07.9 CHEST PAIN: ICD-10-CM

## 2017-11-05 PROBLEM — J42 BRONCHITIS, CHRONIC: Status: ACTIVE | Noted: 2017-11-05

## 2017-11-05 PROBLEM — I10 ESSENTIAL HYPERTENSION: Status: ACTIVE | Noted: 2017-11-05

## 2017-11-05 PROBLEM — Z72.0 TOBACCO ABUSE: Status: ACTIVE | Noted: 2017-11-05

## 2017-11-05 LAB
ALBUMIN SERPL BCP-MCNC: 3.4 G/DL
ALP SERPL-CCNC: 85 U/L
ALT SERPL W/O P-5'-P-CCNC: 47 U/L
ANION GAP SERPL CALC-SCNC: 11 MMOL/L
AST SERPL-CCNC: 52 U/L
BASOPHILS # BLD AUTO: 0.02 K/UL
BASOPHILS NFR BLD: 0.1 %
BILIRUB SERPL-MCNC: 0.7 MG/DL
BILIRUB UR QL STRIP: NEGATIVE
BUN SERPL-MCNC: 7 MG/DL
CALCIUM SERPL-MCNC: 9.6 MG/DL
CHLORIDE SERPL-SCNC: 102 MMOL/L
CLARITY UR: CLEAR
CO2 SERPL-SCNC: 22 MMOL/L
COLOR UR: YELLOW
CREAT SERPL-MCNC: 1 MG/DL
DIFFERENTIAL METHOD: ABNORMAL
EOSINOPHIL # BLD AUTO: 0 K/UL
EOSINOPHIL NFR BLD: 0 %
ERYTHROCYTE [DISTWIDTH] IN BLOOD BY AUTOMATED COUNT: 14.1 %
EST. GFR  (AFRICAN AMERICAN): >60 ML/MIN/1.73 M^2
EST. GFR  (NON AFRICAN AMERICAN): >60 ML/MIN/1.73 M^2
GLUCOSE SERPL-MCNC: 107 MG/DL
GLUCOSE UR QL STRIP: NEGATIVE
HCT VFR BLD AUTO: 39.4 %
HGB BLD-MCNC: 13.2 G/DL
HGB UR QL STRIP: ABNORMAL
KETONES UR QL STRIP: NEGATIVE
LACTATE SERPL-SCNC: 1.3 MMOL/L
LACTATE SERPL-SCNC: 1.6 MMOL/L
LACTATE SERPL-SCNC: 3.4 MMOL/L
LEUKOCYTE ESTERASE UR QL STRIP: NEGATIVE
LIPASE SERPL-CCNC: 11 U/L
LYMPHOCYTES # BLD AUTO: 0.3 K/UL
LYMPHOCYTES NFR BLD: 1.1 %
MCH RBC QN AUTO: 29.3 PG
MCHC RBC AUTO-ENTMCNC: 33.5 G/DL
MCV RBC AUTO: 88 FL
MONOCYTES # BLD AUTO: 0.6 K/UL
MONOCYTES NFR BLD: 2.5 %
NEUTROPHILS # BLD AUTO: 22.1 K/UL
NEUTROPHILS NFR BLD: 97.3 %
NITRITE UR QL STRIP: NEGATIVE
PH UR STRIP: 6 [PH] (ref 5–8)
PLATELET # BLD AUTO: 243 K/UL
PMV BLD AUTO: 9.7 FL
POTASSIUM SERPL-SCNC: 4.1 MMOL/L
PROT SERPL-MCNC: 7.7 G/DL
PROT UR QL STRIP: NEGATIVE
RBC # BLD AUTO: 4.5 M/UL
SODIUM SERPL-SCNC: 135 MMOL/L
SP GR UR STRIP: 1.01 (ref 1–1.03)
URN SPEC COLLECT METH UR: ABNORMAL
UROBILINOGEN UR STRIP-ACNC: NEGATIVE EU/DL
WBC # BLD AUTO: 22.93 K/UL

## 2017-11-05 PROCEDURE — 83690 ASSAY OF LIPASE: CPT

## 2017-11-05 PROCEDURE — 11000001 HC ACUTE MED/SURG PRIVATE ROOM

## 2017-11-05 PROCEDURE — S0030 INJECTION, METRONIDAZOLE: HCPCS | Performed by: INTERNAL MEDICINE

## 2017-11-05 PROCEDURE — 93010 ELECTROCARDIOGRAM REPORT: CPT | Mod: ,,, | Performed by: INTERNAL MEDICINE

## 2017-11-05 PROCEDURE — 96366 THER/PROPH/DIAG IV INF ADDON: CPT

## 2017-11-05 PROCEDURE — 83605 ASSAY OF LACTIC ACID: CPT | Mod: 91

## 2017-11-05 PROCEDURE — 63600175 PHARM REV CODE 636 W HCPCS: Performed by: EMERGENCY MEDICINE

## 2017-11-05 PROCEDURE — 80053 COMPREHEN METABOLIC PANEL: CPT

## 2017-11-05 PROCEDURE — 99900035 HC TECH TIME PER 15 MIN (STAT)

## 2017-11-05 PROCEDURE — 25000003 PHARM REV CODE 250: Performed by: INTERNAL MEDICINE

## 2017-11-05 PROCEDURE — 83605 ASSAY OF LACTIC ACID: CPT

## 2017-11-05 PROCEDURE — 96365 THER/PROPH/DIAG IV INF INIT: CPT

## 2017-11-05 PROCEDURE — 81003 URINALYSIS AUTO W/O SCOPE: CPT

## 2017-11-05 PROCEDURE — 25000003 PHARM REV CODE 250: Performed by: NURSE PRACTITIONER

## 2017-11-05 PROCEDURE — 96375 TX/PRO/DX INJ NEW DRUG ADDON: CPT

## 2017-11-05 PROCEDURE — 96367 TX/PROPH/DG ADDL SEQ IV INF: CPT

## 2017-11-05 PROCEDURE — 87077 CULTURE AEROBIC IDENTIFY: CPT

## 2017-11-05 PROCEDURE — 87086 URINE CULTURE/COLONY COUNT: CPT

## 2017-11-05 PROCEDURE — 96372 THER/PROPH/DIAG INJ SC/IM: CPT | Mod: 59

## 2017-11-05 PROCEDURE — 87040 BLOOD CULTURE FOR BACTERIA: CPT | Mod: 59

## 2017-11-05 PROCEDURE — 63600175 PHARM REV CODE 636 W HCPCS: Performed by: INTERNAL MEDICINE

## 2017-11-05 PROCEDURE — S4991 NICOTINE PATCH NONLEGEND: HCPCS | Performed by: NURSE PRACTITIONER

## 2017-11-05 PROCEDURE — 87186 SC STD MICRODIL/AGAR DIL: CPT

## 2017-11-05 PROCEDURE — 21400001 HC TELEMETRY ROOM

## 2017-11-05 PROCEDURE — 36415 COLL VENOUS BLD VENIPUNCTURE: CPT

## 2017-11-05 PROCEDURE — 96361 HYDRATE IV INFUSION ADD-ON: CPT

## 2017-11-05 PROCEDURE — 96376 TX/PRO/DX INJ SAME DRUG ADON: CPT

## 2017-11-05 PROCEDURE — 85025 COMPLETE CBC W/AUTO DIFF WBC: CPT

## 2017-11-05 PROCEDURE — 99285 EMERGENCY DEPT VISIT HI MDM: CPT | Mod: 25

## 2017-11-05 RX ORDER — CIPROFLOXACIN 2 MG/ML
400 INJECTION, SOLUTION INTRAVENOUS
Status: DISCONTINUED | OUTPATIENT
Start: 2017-11-06 | End: 2017-11-09

## 2017-11-05 RX ORDER — HYDROMORPHONE HYDROCHLORIDE 2 MG/ML
2 INJECTION, SOLUTION INTRAMUSCULAR; INTRAVENOUS; SUBCUTANEOUS ONCE
Status: DISCONTINUED | OUTPATIENT
Start: 2017-11-05 | End: 2017-11-05

## 2017-11-05 RX ORDER — MORPHINE SULFATE 2 MG/ML
4 INJECTION, SOLUTION INTRAMUSCULAR; INTRAVENOUS EVERY 4 HOURS PRN
Status: DISCONTINUED | OUTPATIENT
Start: 2017-11-05 | End: 2017-11-05

## 2017-11-05 RX ORDER — MORPHINE SULFATE 4 MG/ML
4 INJECTION, SOLUTION INTRAMUSCULAR; INTRAVENOUS EVERY 4 HOURS PRN
Status: DISCONTINUED | OUTPATIENT
Start: 2017-11-05 | End: 2017-11-05

## 2017-11-05 RX ORDER — HEPARIN SODIUM 5000 [USP'U]/ML
7500 INJECTION, SOLUTION INTRAVENOUS; SUBCUTANEOUS EVERY 8 HOURS
Status: DISCONTINUED | OUTPATIENT
Start: 2017-11-05 | End: 2017-11-09 | Stop reason: HOSPADM

## 2017-11-05 RX ORDER — MORPHINE SULFATE 2 MG/ML
6 INJECTION, SOLUTION INTRAMUSCULAR; INTRAVENOUS
Status: COMPLETED | OUTPATIENT
Start: 2017-11-05 | End: 2017-11-05

## 2017-11-05 RX ORDER — HYDROCODONE BITARTRATE AND ACETAMINOPHEN 5; 325 MG/1; MG/1
1 TABLET ORAL ONCE AS NEEDED
Status: COMPLETED | OUTPATIENT
Start: 2017-11-05 | End: 2017-11-05

## 2017-11-05 RX ORDER — SODIUM CHLORIDE, SODIUM LACTATE, POTASSIUM CHLORIDE, CALCIUM CHLORIDE 600; 310; 30; 20 MG/100ML; MG/100ML; MG/100ML; MG/100ML
INJECTION, SOLUTION INTRAVENOUS CONTINUOUS
Status: DISCONTINUED | OUTPATIENT
Start: 2017-11-05 | End: 2017-11-06

## 2017-11-05 RX ORDER — ALBUTEROL SULFATE 2.5 MG/.5ML
2.5 SOLUTION RESPIRATORY (INHALATION) EVERY 4 HOURS PRN
Status: DISCONTINUED | OUTPATIENT
Start: 2017-11-05 | End: 2017-11-09 | Stop reason: HOSPADM

## 2017-11-05 RX ORDER — MORPHINE SULFATE 4 MG/ML
4 INJECTION, SOLUTION INTRAMUSCULAR; INTRAVENOUS EVERY 4 HOURS PRN
Status: DISCONTINUED | OUTPATIENT
Start: 2017-11-05 | End: 2017-11-07

## 2017-11-05 RX ORDER — ONDANSETRON 2 MG/ML
4 INJECTION INTRAMUSCULAR; INTRAVENOUS
Status: COMPLETED | OUTPATIENT
Start: 2017-11-05 | End: 2017-11-05

## 2017-11-05 RX ORDER — HYDROMORPHONE HYDROCHLORIDE 1 MG/ML
2 INJECTION, SOLUTION INTRAMUSCULAR; INTRAVENOUS; SUBCUTANEOUS ONCE
Status: COMPLETED | OUTPATIENT
Start: 2017-11-05 | End: 2017-11-05

## 2017-11-05 RX ORDER — ALBUTEROL SULFATE 90 UG/1
2 AEROSOL, METERED RESPIRATORY (INHALATION) EVERY 4 HOURS PRN
Status: DISCONTINUED | OUTPATIENT
Start: 2017-11-05 | End: 2017-11-05

## 2017-11-05 RX ORDER — METRONIDAZOLE 500 MG/100ML
500 INJECTION, SOLUTION INTRAVENOUS
Status: DISCONTINUED | OUTPATIENT
Start: 2017-11-05 | End: 2017-11-08

## 2017-11-05 RX ORDER — METRONIDAZOLE 500 MG/100ML
500 INJECTION, SOLUTION INTRAVENOUS
Status: DISCONTINUED | OUTPATIENT
Start: 2017-11-05 | End: 2017-11-05 | Stop reason: SDUPTHER

## 2017-11-05 RX ORDER — CIPROFLOXACIN 2 MG/ML
400 INJECTION, SOLUTION INTRAVENOUS
Status: COMPLETED | OUTPATIENT
Start: 2017-11-05 | End: 2017-11-05

## 2017-11-05 RX ORDER — ZOLPIDEM TARTRATE 5 MG/1
5 TABLET ORAL NIGHTLY PRN
Status: DISCONTINUED | OUTPATIENT
Start: 2017-11-05 | End: 2017-11-09 | Stop reason: HOSPADM

## 2017-11-05 RX ORDER — FAMOTIDINE 20 MG/1
20 TABLET, FILM COATED ORAL 2 TIMES DAILY
Status: DISCONTINUED | OUTPATIENT
Start: 2017-11-05 | End: 2017-11-09 | Stop reason: HOSPADM

## 2017-11-05 RX ORDER — IBUPROFEN 200 MG
1 TABLET ORAL DAILY
Status: DISCONTINUED | OUTPATIENT
Start: 2017-11-05 | End: 2017-11-09 | Stop reason: HOSPADM

## 2017-11-05 RX ORDER — ONDANSETRON 8 MG/1
8 TABLET, ORALLY DISINTEGRATING ORAL EVERY 8 HOURS PRN
Status: DISCONTINUED | OUTPATIENT
Start: 2017-11-05 | End: 2017-11-09 | Stop reason: HOSPADM

## 2017-11-05 RX ADMIN — SODIUM CHLORIDE 1000 ML: 0.9 INJECTION, SOLUTION INTRAVENOUS at 11:11

## 2017-11-05 RX ADMIN — MORPHINE SULFATE 4 MG: 4 INJECTION, SOLUTION INTRAMUSCULAR; INTRAVENOUS at 01:11

## 2017-11-05 RX ADMIN — SODIUM CHLORIDE, SODIUM LACTATE, POTASSIUM CHLORIDE, AND CALCIUM CHLORIDE: .6; .31; .03; .02 INJECTION, SOLUTION INTRAVENOUS at 03:11

## 2017-11-05 RX ADMIN — ONDANSETRON 8 MG: 8 TABLET, ORALLY DISINTEGRATING ORAL at 01:11

## 2017-11-05 RX ADMIN — MORPHINE SULFATE 4 MG: 4 INJECTION, SOLUTION INTRAMUSCULAR; INTRAVENOUS at 08:11

## 2017-11-05 RX ADMIN — CIPROFLOXACIN 400 MG: 2 INJECTION, SOLUTION INTRAVENOUS at 11:11

## 2017-11-05 RX ADMIN — HYDROMORPHONE HYDROCHLORIDE 2 MG: 1 INJECTION, SOLUTION INTRAMUSCULAR; INTRAVENOUS; SUBCUTANEOUS at 12:11

## 2017-11-05 RX ADMIN — ONDANSETRON 8 MG: 8 TABLET, ORALLY DISINTEGRATING ORAL at 07:11

## 2017-11-05 RX ADMIN — METRONIDAZOLE 500 MG: 500 INJECTION, SOLUTION INTRAVENOUS at 12:11

## 2017-11-05 RX ADMIN — SODIUM CHLORIDE 4056 ML: 0.9 INJECTION, SOLUTION INTRAVENOUS at 12:11

## 2017-11-05 RX ADMIN — NICOTINE 1 PATCH: 21 PATCH, EXTENDED RELEASE TRANSDERMAL at 03:11

## 2017-11-05 RX ADMIN — MORPHINE SULFATE 6 MG: 2 INJECTION, SOLUTION INTRAMUSCULAR; INTRAVENOUS at 11:11

## 2017-11-05 RX ADMIN — HYDROCODONE BITARTRATE AND ACETAMINOPHEN 1 TABLET: 5; 325 TABLET ORAL at 11:11

## 2017-11-05 RX ADMIN — FAMOTIDINE 20 MG: 20 TABLET ORAL at 08:11

## 2017-11-05 RX ADMIN — HEPARIN SODIUM 7500 UNITS: 5000 INJECTION, SOLUTION INTRAVENOUS; SUBCUTANEOUS at 11:11

## 2017-11-05 RX ADMIN — ZOLPIDEM TARTRATE 5 MG: 5 TABLET, FILM COATED ORAL at 08:11

## 2017-11-05 RX ADMIN — METRONIDAZOLE 500 MG: 500 INJECTION, SOLUTION INTRAVENOUS at 08:11

## 2017-11-05 RX ADMIN — HEPARIN SODIUM 7500 UNITS: 5000 INJECTION, SOLUTION INTRAVENOUS; SUBCUTANEOUS at 01:11

## 2017-11-05 RX ADMIN — ONDANSETRON 4 MG: 2 INJECTION INTRAMUSCULAR; INTRAVENOUS at 11:11

## 2017-11-05 RX ADMIN — CIPROFLOXACIN 400 MG: 2 INJECTION, SOLUTION INTRAVENOUS at 12:11

## 2017-11-05 NOTE — H&P
"[facility] Hospital Medicine  History & Physical    Patient Name: Laura Vora  MRN: [id number]  Admission Date: 11/5/2017  Attending Physician: Thom Foreman MD   Primary Care Provider: JOVON Aviles         Patient information was obtained from patient and ER records.     Subjective:     Principal Problem:Severe sepsis    Chief Complaint:   Chief Complaint   Patient presents with    Abdominal Pain     Pt states, "THe bottom of my stomach hurts and I keep getting the chills."        HPI: Mr Vora is a 47 year old female with PMHx of HTN, tobacco abuse, morbid obesity, appendectomy and cholecystectomy. She presented to [facility] Emergency Room with complaints of severe lower abdominal pain that started last night. Associated symptoms include fever, chills, nausea and vomiting. Denies associated symptoms of chest pain, shortness of breath, palpitations, dizziness, constipation, bloody stools or syncope. CT of abdomen today showed sigmoid diverticulosis with mild wall thickening and pericolonic inflammatory change consistent with acute uncomplicated diverticulitis. WBC 22.93, Lactic acid 3.4.       Past Medical History:   Diagnosis Date    Bronchitis     Diverticulitis     HTN (hypertension)     Obese     Tobacco use        Past Surgical History:   Procedure Laterality Date    APPENDECTOMY      CHOLECYSTECTOMY      HYSTERECTOMY         Review of patient's allergies indicates:   Allergen Reactions    Iodine and iodide containing products Hives    Bactrim [sulfamethoxazole-trimethoprim] Hives       No current facility-administered medications on file prior to encounter.      Current Outpatient Prescriptions on File Prior to Encounter   Medication Sig    albuterol 90 mcg/actuation inhaler Inhale 2 puffs into the lungs every 4 (four) hours as needed for Wheezing.    hydrochlorothiazide (HYDRODIURIL) 25 MG tablet Take 25 mg by mouth once daily.    lisinopril (PRINIVIL,ZESTRIL) 40 MG " tablet Take 40 mg by mouth once daily.    guaifenesin-codeine 100-10 mg/5 ml (TUSSI-ORGANIDIN NR)  mg/5 mL syrup Take 5 mLs by mouth every 6 (six) hours as needed for Cough.    ibuprofen (ADVIL,MOTRIN) 800 MG tablet Take 1 tablet (800 mg total) by mouth every 6 (six) hours as needed for Pain.    ondansetron (ZOFRAN) 4 MG tablet Take 1 tablet (4 mg total) by mouth every 8 (eight) hours as needed.    [DISCONTINUED] amoxicillin-clavulanate 875-125mg (AUGMENTIN) 875-125 mg per tablet Take 1 tablet by mouth 2 (two) times daily.     Family History     Problem Relation (Age of Onset)    Cancer Sister, Paternal Aunt    Diabetes Mother    Heart disease Mother    Hypertension Mother        Social History Main Topics    Smoking status: Current Every Day Smoker     Packs/day: 0.50     Last attempt to quit: 1/16/2015    Smokeless tobacco: Never Used    Alcohol use Yes      Comment: occasionally    Drug use: No    Sexual activity: Not on file     Review of Systems   Constitutional: Positive for fever. Negative for chills.   HENT: Negative for congestion, rhinorrhea and sinus pressure.    Respiratory: Negative for apnea, cough, choking, chest tightness, shortness of breath, wheezing and stridor.    Cardiovascular: Negative for chest pain, palpitations and leg swelling.   Gastrointestinal: Positive for abdominal pain, nausea and vomiting. Negative for abdominal distention.   Endocrine: Negative for cold intolerance and heat intolerance.   Genitourinary: Negative for difficulty urinating and hematuria.   Musculoskeletal: Negative for arthralgias and joint swelling.   Skin: Negative for color change, pallor and rash.   Neurological: Negative for dizziness, seizures, weakness, numbness and headaches.   Psychiatric/Behavioral: Negative for agitation. The patient is not nervous/anxious.      Objective:     Vital Signs (Most Recent):  Temp: 99.8 °F (37.7 °C) (11/05/17 1037)  Pulse: 106 (11/05/17 1355)  Resp: 12 (11/05/17  1355)  BP: 127/74 (11/05/17 1355)  SpO2: 95 % (11/05/17 1355) Vital Signs (24h Range):  Temp:  [99.8 °F (37.7 °C)] 99.8 °F (37.7 °C)  Pulse:  [100-121] 106  Resp:  [12-20] 12  SpO2:  [94 %-100 %] 95 %  BP: (115-184)/(68-94) 127/74     Weight: 135.2 kg (298 lb)  Body mass index is 48.1 kg/m².    Physical Exam   Constitutional: She is oriented to person, place, and time. She appears well-developed and well-nourished.   HENT:   Head: Normocephalic and atraumatic.   Eyes: Right eye exhibits no discharge. Left eye exhibits no discharge.   Neck: No JVD present.   Cardiovascular: Exam reveals no gallop and no friction rub.    No murmur heard.  Pulmonary/Chest: Effort normal and breath sounds normal. No respiratory distress. She has no wheezes. She has no rales. She exhibits no tenderness.   Abdominal: Soft. Bowel sounds are normal. There is tenderness in the right lower quadrant and left lower quadrant.   Musculoskeletal: She exhibits no edema or deformity.   Neurological: She is alert and oriented to person, place, and time.   Skin: Skin is warm and dry. Capillary refill takes 2 to 3 seconds.   Psychiatric: She has a normal mood and affect. Her behavior is normal. Judgment and thought content normal.   Nursing note and vitals reviewed.       Significant Labs:     Results for orders placed or performed during the hospital encounter of 11/05/17   CBC W/ AUTO DIFFERENTIAL   Result Value Ref Range    WBC 22.93 (H) 3.90 - 12.70 K/uL    RBC 4.50 4.00 - 5.40 M/uL    Hemoglobin 13.2 12.0 - 16.0 g/dL    Hematocrit 39.4 37.0 - 48.5 %    MCV 88 82 - 98 fL    MCH 29.3 27.0 - 31.0 pg    MCHC 33.5 32.0 - 36.0 g/dL    RDW 14.1 11.5 - 14.5 %    Platelets 243 150 - 350 K/uL    MPV 9.7 9.2 - 12.9 fL    Gran # 22.1 (H) 1.8 - 7.7 K/uL    Lymph # 0.3 (L) 1.0 - 4.8 K/uL    Mono # 0.6 0.3 - 1.0 K/uL    Eos # 0.0 0.0 - 0.5 K/uL    Baso # 0.02 0.00 - 0.20 K/uL    Gran% 97.3 (H) 38.0 - 73.0 %    Lymph% 1.1 (L) 18.0 - 48.0 %    Mono% 2.5 (L) 4.0 -  15.0 %    Eosinophil% 0.0 0.0 - 8.0 %    Basophil% 0.1 0.0 - 1.9 %    Differential Method Automated    Comp. Metabolic Panel   Result Value Ref Range    Sodium 135 (L) 136 - 145 mmol/L    Potassium 4.1 3.5 - 5.1 mmol/L    Chloride 102 95 - 110 mmol/L    CO2 22 (L) 23 - 29 mmol/L    Glucose 107 70 - 110 mg/dL    BUN, Bld 7 6 - 20 mg/dL    Creatinine 1.0 0.5 - 1.4 mg/dL    Calcium 9.6 8.7 - 10.5 mg/dL    Total Protein 7.7 6.0 - 8.4 g/dL    Albumin 3.4 (L) 3.5 - 5.2 g/dL    Total Bilirubin 0.7 0.1 - 1.0 mg/dL    Alkaline Phosphatase 85 55 - 135 U/L    AST 52 (H) 10 - 40 U/L    ALT 47 (H) 10 - 44 U/L    Anion Gap 11 8 - 16 mmol/L    eGFR if African American >60 >60 mL/min/1.73 m^2    eGFR if non African American >60 >60 mL/min/1.73 m^2   Lipase   Result Value Ref Range    Lipase 11 4 - 60 U/L   Urinalysis - Clean Catch   Result Value Ref Range    Specimen UA Urine, Clean Catch     Color, UA Yellow Yellow, Straw, [place]    Appearance, UA Clear Clear    pH, UA 6.0 5.0 - 8.0    Specific Gravity, UA 1.015 1.005 - 1.030    Protein, UA Negative Negative    Glucose, UA Negative Negative    Ketones, UA Negative Negative    Bilirubin (UA) Negative Negative    Occult Blood UA Trace (A) Negative    Nitrite, UA Negative Negative    Urobilinogen, UA Negative <2.0 EU/dL    Leukocytes, UA Negative Negative   Lactic acid, plasma   Result Value Ref Range    Lactate (Lactic Acid) 3.4 (H) 0.5 - 2.2 mmol/L       Significant Imaging:   Imaging Results          CT Abdomen Pelvis  Without Contrast (Final result)  Result time 11/05/17 11:40:43   Procedure changed from CT Abdomen Pelvis With Contrast     Final result by Chuck Hernandez MD (11/05/17 11:40:43)                 Impression:       Sigmoid diverticulosis with mild wall thickening and pericolonic inflammatory change consistent with acute uncomplicated diverticulitis.     All CT scans at this facility use dose modulation, iterative reconstruction and/or weight based dosing when  appropriate to reduce radiation dose to as low as reasonably achievable.      Electronically signed by: MAY CHONG MD  Date:     11/05/17  Time:    11:40              Narrative:    Indication: Abdominal pain.    Routine noncontrast CT images of the abdomen and pelvis were obtained.    Findings: Comparison made to exam 7/28/16    The lung bases are well aerated.  Heart size is normal.  No pericardial or pleural effusion.      The liver is normal in size and attenuation on this noncontrast exam. Gallbladder is surgically absent.  The stomach, spleen, pancreas, adrenal glands are normal.  Minimal stable adrenal thickening. Aorta demonstrates moderate atherosclerotic disease.      The kidneys are normal in size shape and position without radiopaque calculus or signs of hydronephrosis. The bladder is incompletely distended. The uterus is not seen. Bilateral ovaries are noted. Cyst or follicle is suspected within the left ovary.    The visualized loops of small bowel are unremarkable.The appendix is visualized in the right lower quadrant.  There is no inflammation.    Sigmoid diverticulosis with mild wall thickening and pericolonic inflammatory change consistent with acute uncomplicated diverticulitis. Shotty nodes are seen on the iliac chain which are likely reactive.     Bones appear intact with moderate disc degenerative changes at L4/5 and L5/S1 with posterior disc bulges and mild bilateral neuroforaminal narrowing.                            Assessment/Plan:     * Severe sepsis    Pt received fluid bolus of 4056 ml in the ER   Lactic acid 3.4  Continue Serial Lactic acids   BC X 2   Urine culture   Cipro 400 mg IV BID  Metronidazole 500 mg IV every 8 hours               Diverticulitis of large intestine without perforation or abscess    Cipro 400 mg IV BID  Metronidazole 500 mg IV every 8 hours   Morphine 4 mg IV every 4 hours PRN pain   Pepcid 20 mg PO BID   Start Clear liquid diet             Bronchitis, chronic     No complaint of wheezing or SOB  Restart albuterol           Essential hypertension    Restart lisinopril and HCTZ            Tobacco abuse    Nicotine patch   Tobacco cessation             VTE Risk Mitigation         Ordered     heparin (porcine) injection 7,500 Units  Every 8 hours     Route:  Subcutaneous        11/05/17 1252     Medium Risk of VTE  Once      11/05/17 1252             Rohan Gallegos NP  Department of Hospital Medicine   [facility]

## 2017-11-05 NOTE — HPI
Mr Vora is a 47 year old female with PMHx of HTN, tobacco abuse, morbid obesity, appendectomy and cholecystectomy. She presented to [facility] Emergency Room with complaints of severe lower abdominal pain that started last night. Associated symptoms include fever, chills, nausea and vomiting. Denies associated symptoms of chest pain, shortness of breath, palpitations, dizziness, constipation, bloody stools or syncope. CT of abdomen today showed sigmoid diverticulosis with mild wall thickening and pericolonic inflammatory change consistent with acute uncomplicated diverticulitis. WBC 22.93, Lactic acid 3.4.

## 2017-11-05 NOTE — SUBJECTIVE & OBJECTIVE
Past Medical History:   Diagnosis Date    Bronchitis     Diverticulitis     HTN (hypertension)     Obese     Tobacco use        Past Surgical History:   Procedure Laterality Date    APPENDECTOMY      CHOLECYSTECTOMY      HYSTERECTOMY         Review of patient's allergies indicates:   Allergen Reactions    Iodine and iodide containing products Hives    Bactrim [sulfamethoxazole-trimethoprim] Hives       No current facility-administered medications on file prior to encounter.      Current Outpatient Prescriptions on File Prior to Encounter   Medication Sig    albuterol 90 mcg/actuation inhaler Inhale 2 puffs into the lungs every 4 (four) hours as needed for Wheezing.    hydrochlorothiazide (HYDRODIURIL) 25 MG tablet Take 25 mg by mouth once daily.    lisinopril (PRINIVIL,ZESTRIL) 40 MG tablet Take 40 mg by mouth once daily.    guaifenesin-codeine 100-10 mg/5 ml (TUSSI-ORGANIDIN NR)  mg/5 mL syrup Take 5 mLs by mouth every 6 (six) hours as needed for Cough.    ibuprofen (ADVIL,MOTRIN) 800 MG tablet Take 1 tablet (800 mg total) by mouth every 6 (six) hours as needed for Pain.    ondansetron (ZOFRAN) 4 MG tablet Take 1 tablet (4 mg total) by mouth every 8 (eight) hours as needed.    [DISCONTINUED] amoxicillin-clavulanate 875-125mg (AUGMENTIN) 875-125 mg per tablet Take 1 tablet by mouth 2 (two) times daily.     Family History     Problem Relation (Age of Onset)    Cancer Sister, Paternal Aunt    Diabetes Mother    Heart disease Mother    Hypertension Mother        Social History Main Topics    Smoking status: Current Every Day Smoker     Packs/day: 0.50     Last attempt to quit: 1/16/2015    Smokeless tobacco: Never Used    Alcohol use Yes      Comment: occasionally    Drug use: No    Sexual activity: Not on file     Review of Systems   Constitutional: Positive for fever. Negative for chills.   HENT: Negative for congestion, rhinorrhea and sinus pressure.    Respiratory: Negative for apnea,  cough, choking, chest tightness, shortness of breath, wheezing and stridor.    Cardiovascular: Negative for chest pain, palpitations and leg swelling.   Gastrointestinal: Positive for abdominal pain, nausea and vomiting. Negative for abdominal distention.   Endocrine: Negative for cold intolerance and heat intolerance.   Genitourinary: Negative for difficulty urinating and hematuria.   Musculoskeletal: Negative for arthralgias and joint swelling.   Skin: Negative for color change, pallor and rash.   Neurological: Negative for dizziness, seizures, weakness, numbness and headaches.   Psychiatric/Behavioral: Negative for agitation. The patient is not nervous/anxious.      Objective:     Vital Signs (Most Recent):  Temp: 99.8 °F (37.7 °C) (11/05/17 1037)  Pulse: 106 (11/05/17 1355)  Resp: 12 (11/05/17 1355)  BP: 127/74 (11/05/17 1355)  SpO2: 95 % (11/05/17 1355) Vital Signs (24h Range):  Temp:  [99.8 °F (37.7 °C)] 99.8 °F (37.7 °C)  Pulse:  [100-121] 106  Resp:  [12-20] 12  SpO2:  [94 %-100 %] 95 %  BP: (115-184)/(68-94) 127/74     Weight: 135.2 kg (298 lb)  Body mass index is 48.1 kg/m².    Physical Exam   Constitutional: She is oriented to person, place, and time. She appears well-developed and well-nourished.   HENT:   Head: Normocephalic and atraumatic.   Eyes: Right eye exhibits no discharge. Left eye exhibits no discharge.   Neck: No JVD present.   Cardiovascular: Exam reveals no gallop and no friction rub.    No murmur heard.  Pulmonary/Chest: Effort normal and breath sounds normal. No respiratory distress. She has no wheezes. She has no rales. She exhibits no tenderness.   Abdominal: Soft. Bowel sounds are normal. There is tenderness in the right lower quadrant and left lower quadrant.   Musculoskeletal: She exhibits no edema or deformity.   Neurological: She is alert and oriented to person, place, and time.   Skin: Skin is warm and dry. Capillary refill takes 2 to 3 seconds.   Psychiatric: She has a normal mood  and affect. Her behavior is normal. Judgment and thought content normal.   Nursing note and vitals reviewed.       Significant Labs:     Results for orders placed or performed during the hospital encounter of 11/05/17   CBC W/ AUTO DIFFERENTIAL   Result Value Ref Range    WBC 22.93 (H) 3.90 - 12.70 K/uL    RBC 4.50 4.00 - 5.40 M/uL    Hemoglobin 13.2 12.0 - 16.0 g/dL    Hematocrit 39.4 37.0 - 48.5 %    MCV 88 82 - 98 fL    MCH 29.3 27.0 - 31.0 pg    MCHC 33.5 32.0 - 36.0 g/dL    RDW 14.1 11.5 - 14.5 %    Platelets 243 150 - 350 K/uL    MPV 9.7 9.2 - 12.9 fL    Gran # 22.1 (H) 1.8 - 7.7 K/uL    Lymph # 0.3 (L) 1.0 - 4.8 K/uL    Mono # 0.6 0.3 - 1.0 K/uL    Eos # 0.0 0.0 - 0.5 K/uL    Baso # 0.02 0.00 - 0.20 K/uL    Gran% 97.3 (H) 38.0 - 73.0 %    Lymph% 1.1 (L) 18.0 - 48.0 %    Mono% 2.5 (L) 4.0 - 15.0 %    Eosinophil% 0.0 0.0 - 8.0 %    Basophil% 0.1 0.0 - 1.9 %    Differential Method Automated    Comp. Metabolic Panel   Result Value Ref Range    Sodium 135 (L) 136 - 145 mmol/L    Potassium 4.1 3.5 - 5.1 mmol/L    Chloride 102 95 - 110 mmol/L    CO2 22 (L) 23 - 29 mmol/L    Glucose 107 70 - 110 mg/dL    BUN, Bld 7 6 - 20 mg/dL    Creatinine 1.0 0.5 - 1.4 mg/dL    Calcium 9.6 8.7 - 10.5 mg/dL    Total Protein 7.7 6.0 - 8.4 g/dL    Albumin 3.4 (L) 3.5 - 5.2 g/dL    Total Bilirubin 0.7 0.1 - 1.0 mg/dL    Alkaline Phosphatase 85 55 - 135 U/L    AST 52 (H) 10 - 40 U/L    ALT 47 (H) 10 - 44 U/L    Anion Gap 11 8 - 16 mmol/L    eGFR if African American >60 >60 mL/min/1.73 m^2    eGFR if non African American >60 >60 mL/min/1.73 m^2   Lipase   Result Value Ref Range    Lipase 11 4 - 60 U/L   Urinalysis - Clean Catch   Result Value Ref Range    Specimen UA Urine, Clean Catch     Color, UA Yellow Yellow, Straw, Dayami    Appearance, UA Clear Clear    pH, UA 6.0 5.0 - 8.0    Specific Gravity, UA 1.015 1.005 - 1.030    Protein, UA Negative Negative    Glucose, UA Negative Negative    Ketones, UA Negative Negative    Bilirubin  (UA) Negative Negative    Occult Blood UA Trace (A) Negative    Nitrite, UA Negative Negative    Urobilinogen, UA Negative <2.0 EU/dL    Leukocytes, UA Negative Negative   Lactic acid, plasma   Result Value Ref Range    Lactate (Lactic Acid) 3.4 (H) 0.5 - 2.2 mmol/L       Significant Imaging:   Imaging Results          CT Abdomen Pelvis  Without Contrast (Final result)  Result time 11/05/17 11:40:43   Procedure changed from CT Abdomen Pelvis With Contrast     Final result by Chuck Chong MD (11/05/17 11:40:43)                 Impression:       Sigmoid diverticulosis with mild wall thickening and pericolonic inflammatory change consistent with acute uncomplicated diverticulitis.     All CT scans at this facility use dose modulation, iterative reconstruction and/or weight based dosing when appropriate to reduce radiation dose to as low as reasonably achievable.      Electronically signed by: CHUCK CHONG MD  Date:     11/05/17  Time:    11:40              Narrative:    Indication: Abdominal pain.    Routine noncontrast CT images of the abdomen and pelvis were obtained.    Findings: Comparison made to exam 7/28/16    The lung bases are well aerated.  Heart size is normal.  No pericardial or pleural effusion.      The liver is normal in size and attenuation on this noncontrast exam. Gallbladder is surgically absent.  The stomach, spleen, pancreas, adrenal glands are normal.  Minimal stable adrenal thickening. Aorta demonstrates moderate atherosclerotic disease.      The kidneys are normal in size shape and position without radiopaque calculus or signs of hydronephrosis. The bladder is incompletely distended. The uterus is not seen. Bilateral ovaries are noted. Cyst or follicle is suspected within the left ovary.    The visualized loops of small bowel are unremarkable.The appendix is visualized in the right lower quadrant.  There is no inflammation.    Sigmoid diverticulosis with mild wall thickening and pericolonic  inflammatory change consistent with acute uncomplicated diverticulitis. Shotty nodes are seen on the iliac chain which are likely reactive.     Bones appear intact with moderate disc degenerative changes at L4/5 and L5/S1 with posterior disc bulges and mild bilateral neuroforaminal narrowing.

## 2017-11-05 NOTE — ASSESSMENT & PLAN NOTE
Pt received fluid bolus in the ER   Serial Lactic acids   BC X 2   Urine culture   Cipro 400 mg IV BID  Metronidazole 500 mg IV every 8 hours

## 2017-11-05 NOTE — ASSESSMENT & PLAN NOTE
Cipro 400 mg IV BID  Metronidazole 500 mg IV every 8 hours   Morphine 4 mg IV every 4 hours PRN pain   Pepcid 20 mg PO BID   Start Clear liquid diet

## 2017-11-05 NOTE — ED PROVIDER NOTES
"SCRIBE #1 NOTE: I, Cem Myrick, am scribing for, and in the presence of, Erik Ag MD. I have scribed the entire note.      History      Chief Complaint   Patient presents with    Abdominal Pain     Pt states, "THe bottom of my stomach hurts and I keep getting the chills."       Review of patient's allergies indicates:   Allergen Reactions    Iodine and iodide containing products Hives    Bactrim [sulfamethoxazole-trimethoprim] Hives        HPI   HPI    11/5/2017, 10:39 AM   History obtained from the patient      History of Present Illness: Laura Vora is a 47 y.o. female patient with a PMHx of diverticulitis, who presents to the Emergency Department for lower ABD pain which onset gradually last night. Sxs are constant and moderate in severity. There are no mitigating or exacerbating factors noted. Associated sxs include nausea, emesis, chills, fever.  Pt denies any constipation, dysuria, hematuria, blood in stool, SOB, and all other sxs at this time. No further complaints or concerns at this time.       Arrival mode: Personal vehicle     PCP: JOVON Aviles       Past Medical History:  Past Medical History:   Diagnosis Date    Diverticulitis     HTN (hypertension)     Obese     Tobacco use        Past Surgical History:  Past Surgical History:   Procedure Laterality Date    APPENDECTOMY      CHOLECYSTECTOMY      HYSTERECTOMY           Family History:  Family History   Problem Relation Age of Onset    Hypertension         Social History:  Social History     Social History Main Topics    Smoking status: Current Every Day Smoker     Packs/day: 1.00     Last attempt to quit: 1/16/2015    Smokeless tobacco: Never Used    Alcohol use Yes    Drug use: No    Sexual activity: unknown       ROS   Review of Systems   Constitutional: Positive for chills and fever.   HENT: Negative for sore throat.    Respiratory: Negative for cough and shortness of breath.    Cardiovascular: " "Negative for chest pain.   Gastrointestinal: Positive for abdominal pain, nausea and vomiting.   Genitourinary: Negative for dysuria, hematuria, vaginal bleeding, vaginal discharge and vaginal pain.   Musculoskeletal: Negative for back pain.   Skin: Negative for rash.   Neurological: Negative for weakness.   Hematological: Does not bruise/bleed easily.     Physical Exam      Initial Vitals [11/05/17 1037]   BP Pulse Resp Temp SpO2   (!) 184/91 (!) 121 20 99.8 °F (37.7 °C) 100 %      MAP       122          Physical Exam  Nursing Notes and Vital Signs Reviewed.  Constitutional: Patient is in no acute distress. Well-developed and well-nourished. Obese.  Head: Atraumatic. Normocephalic.  Eyes: PERRL. EOM intact. Conjunctivae are not pale. No scleral icterus.  ENT: Mucous membranes are moist. Oropharynx is clear and symmetric.    Neck: Supple. Full ROM. No lymphadenopathy.  Cardiovascular: Tachycardic. Regular rhythm. No murmurs, rubs, or gallops. Distal pulses are 2+ and symmetric.  Pulmonary/Chest: No respiratory distress. Clear to auscultation bilaterally. No wheezing, rales, or rhonchi.  Abdominal: Soft and non-distended.  There is periumbilical, suprapubic tenderness.  No rebound, guarding, or rigidity. Good bowel sounds.  Genitourinary: Left CVA tenderness  Musculoskeletal: Moves all extremities. No obvious deformities. No edema. No calf tenderness.  Skin: Warm and dry. Cholecystectomy scar.  Neurological:  Alert, awake, and appropriate.  Normal speech.  No acute focal neurological deficits are appreciated.  Psychiatric: Normal affect. Good eye contact. Appropriate in content.    ED Course    Procedures  ED Vital Signs:  Vitals:    11/05/17 1037 11/05/17 1157 11/05/17 1218 11/05/17 1232   BP: (!) 184/91 127/68 (!) 137/94 129/87   Pulse: (!) 121 100 (!) 115 (!) 119   Resp: 20 16 17 20   Temp: 99.8 °F (37.7 °C)      TempSrc: Oral      SpO2: 100% 98% 99% (!) 94%   Weight: 135.2 kg (298 lb)      Height: 5' 6" (1.676 m) "       11/05/17 1302   BP: 115/72   Pulse: (!) 112   Resp: 17   Temp:    TempSrc:    SpO2: 95%   Weight:    Height:        Abnormal Lab Results:  Labs Reviewed   CBC W/ AUTO DIFFERENTIAL - Abnormal; Notable for the following:        Result Value    WBC 22.93 (*)     Gran # 22.1 (*)     Lymph # 0.3 (*)     Gran% 97.3 (*)     Lymph% 1.1 (*)     Mono% 2.5 (*)     All other components within normal limits   COMPREHENSIVE METABOLIC PANEL - Abnormal; Notable for the following:     Sodium 135 (*)     CO2 22 (*)     Albumin 3.4 (*)     AST 52 (*)     ALT 47 (*)     All other components within normal limits   URINALYSIS - Abnormal; Notable for the following:     Occult Blood UA Trace (*)     All other components within normal limits   LACTIC ACID, PLASMA - Abnormal; Notable for the following:     Lactate (Lactic Acid) 3.4 (*)     All other components within normal limits   CULTURE, URINE   CULTURE, BLOOD   CULTURE, BLOOD   CULTURE, URINE   LIPASE   LACTIC ACID, PLASMA        All Lab Results:  Results for orders placed or performed during the hospital encounter of 11/05/17   CBC W/ AUTO DIFFERENTIAL   Result Value Ref Range    WBC 22.93 (H) 3.90 - 12.70 K/uL    RBC 4.50 4.00 - 5.40 M/uL    Hemoglobin 13.2 12.0 - 16.0 g/dL    Hematocrit 39.4 37.0 - 48.5 %    MCV 88 82 - 98 fL    MCH 29.3 27.0 - 31.0 pg    MCHC 33.5 32.0 - 36.0 g/dL    RDW 14.1 11.5 - 14.5 %    Platelets 243 150 - 350 K/uL    MPV 9.7 9.2 - 12.9 fL    Gran # 22.1 (H) 1.8 - 7.7 K/uL    Lymph # 0.3 (L) 1.0 - 4.8 K/uL    Mono # 0.6 0.3 - 1.0 K/uL    Eos # 0.0 0.0 - 0.5 K/uL    Baso # 0.02 0.00 - 0.20 K/uL    Gran% 97.3 (H) 38.0 - 73.0 %    Lymph% 1.1 (L) 18.0 - 48.0 %    Mono% 2.5 (L) 4.0 - 15.0 %    Eosinophil% 0.0 0.0 - 8.0 %    Basophil% 0.1 0.0 - 1.9 %    Differential Method Automated    Comp. Metabolic Panel   Result Value Ref Range    Sodium 135 (L) 136 - 145 mmol/L    Potassium 4.1 3.5 - 5.1 mmol/L    Chloride 102 95 - 110 mmol/L    CO2 22 (L) 23 - 29 mmol/L     Glucose 107 70 - 110 mg/dL    BUN, Bld 7 6 - 20 mg/dL    Creatinine 1.0 0.5 - 1.4 mg/dL    Calcium 9.6 8.7 - 10.5 mg/dL    Total Protein 7.7 6.0 - 8.4 g/dL    Albumin 3.4 (L) 3.5 - 5.2 g/dL    Total Bilirubin 0.7 0.1 - 1.0 mg/dL    Alkaline Phosphatase 85 55 - 135 U/L    AST 52 (H) 10 - 40 U/L    ALT 47 (H) 10 - 44 U/L    Anion Gap 11 8 - 16 mmol/L    eGFR if African American >60 >60 mL/min/1.73 m^2    eGFR if non African American >60 >60 mL/min/1.73 m^2   Lipase   Result Value Ref Range    Lipase 11 4 - 60 U/L   Urinalysis - Clean Catch   Result Value Ref Range    Specimen UA Urine, Clean Catch     Color, UA Yellow Yellow, Straw, Dayami    Appearance, UA Clear Clear    pH, UA 6.0 5.0 - 8.0    Specific Gravity, UA 1.015 1.005 - 1.030    Protein, UA Negative Negative    Glucose, UA Negative Negative    Ketones, UA Negative Negative    Bilirubin (UA) Negative Negative    Occult Blood UA Trace (A) Negative    Nitrite, UA Negative Negative    Urobilinogen, UA Negative <2.0 EU/dL    Leukocytes, UA Negative Negative   Lactic acid, plasma   Result Value Ref Range    Lactate (Lactic Acid) 3.4 (H) 0.5 - 2.2 mmol/L         Imaging Results:  Imaging Results          CT Abdomen Pelvis  Without Contrast (Final result)  Result time 11/05/17 11:40:43   Procedure changed from CT Abdomen Pelvis With Contrast     Final result by Chuck Chong MD (11/05/17 11:40:43)                 Impression:       Sigmoid diverticulosis with mild wall thickening and pericolonic inflammatory change consistent with acute uncomplicated diverticulitis.     All CT scans at this facility use dose modulation, iterative reconstruction and/or weight based dosing when appropriate to reduce radiation dose to as low as reasonably achievable.      Electronically signed by: CHUCK CHONG MD  Date:     11/05/17  Time:    11:40              Narrative:    Indication: Abdominal pain.    Routine noncontrast CT images of the abdomen and pelvis were  obtained.    Findings: Comparison made to exam 7/28/16    The lung bases are well aerated.  Heart size is normal.  No pericardial or pleural effusion.      The liver is normal in size and attenuation on this noncontrast exam. Gallbladder is surgically absent.  The stomach, spleen, pancreas, adrenal glands are normal.  Minimal stable adrenal thickening. Aorta demonstrates moderate atherosclerotic disease.      The kidneys are normal in size shape and position without radiopaque calculus or signs of hydronephrosis. The bladder is incompletely distended. The uterus is not seen. Bilateral ovaries are noted. Cyst or follicle is suspected within the left ovary.    The visualized loops of small bowel are unremarkable.The appendix is visualized in the right lower quadrant.  There is no inflammation.    Sigmoid diverticulosis with mild wall thickening and pericolonic inflammatory change consistent with acute uncomplicated diverticulitis. Shotty nodes are seen on the iliac chain which are likely reactive.     Bones appear intact with moderate disc degenerative changes at L4/5 and L5/S1 with posterior disc bulges and mild bilateral neuroforaminal narrowing.                                        The EKG was ordered, reviewed, and independently interpreted by the ED provider.  Interpretation time: 10:54  Rate: 116 BPM  Rhythm: sinus tachycardia  Interpretation: No acute ST changes. No STEMI.      The Emergency Provider reviewed the vital signs and test results, which are outlined above.    ED Discussion     12:01 PM: Re-evaluated pt. I have discussed test results, shared treatment plan, and the need for admission with patient at bedside. Pt expresses understanding at this time and agree with all information. All questions answered. Pt has no further questions or concerns at this time. Pt is ready for admit.    12:10 PM: Discussed case with Charla Dos Santos NP ([facility]). Charla agrees with current care and  management of pt and accepts admission.   Admitting Service: Hospital medicine   Admitting Physician: Dr. Foreman  Admit to: telemetry      ED Medication(s):  Medications   metronidazole IVPB 500 mg (500 mg Intravenous New Bag 11/5/17 1259)   lactated ringers infusion (not administered)   heparin (porcine) injection 7,500 Units (not administered)   ciprofloxacin (CIPRO)400mg/200ml D5W IVPB 400 mg (not administered)   morphine injection 4 mg (not administered)   famotidine tablet 20 mg (not administered)   ondansetron disintegrating tablet 8 mg (not administered)   zolpidem tablet 5 mg (not administered)   sodium chloride 0.9% bolus 1,000 mL (0 mLs Intravenous Stopped 11/5/17 1302)   morphine injection 6 mg (6 mg Intravenous Given 11/5/17 1102)   ondansetron injection 4 mg (4 mg Intravenous Given 11/5/17 1103)   ciprofloxacin (CIPRO)400mg/200ml D5W IVPB 400 mg (400 mg Intravenous New Bag 11/5/17 1217)   sodium chloride 0.9% bolus 4,056 mL (4,056 mLs Intravenous New Bag 11/5/17 1217)   HYDROmorphone injection 2 mg (2 mg Intravenous Given 11/5/17 1219)       New Prescriptions    No medications on file             Medical Decision Making    Medical Decision Making:   Clinical Tests:   Lab Tests: Reviewed and Ordered  Radiological Study: Reviewed and Ordered           Scribe Attestation:   Scribe #1: I performed the above scribed service and the documentation accurately describes the services I performed. I attest to the accuracy of the note.    Attending:   Physician Attestation Statement for Scribe #1: I, Erik Ag MD, personally performed the services described in this documentation, as scribed by Cem Myrick, in my presence, and it is both accurate and complete.          Clinical Impression       ICD-10-CM ICD-9-CM   1. Sepsis, due to unspecified organism A41.9 038.9     995.91   2. Diverticulitis of large intestine without perforation or abscess without bleeding K57.32 562.11       Disposition:    Disposition: Admitted  Condition: Fair         Erik Ag MD  11/05/17 [id number]

## 2017-11-05 NOTE — PLAN OF CARE
Problem: Patient Care Overview  Goal: Plan of Care Review  Outcome: Ongoing (interventions implemented as appropriate)  Pt remains free from injury/falls. Fall precautions in place. Pt refuses bed alarm. Pt turns independently in bed and can get in and out of bed by herself. Pt tolerating clear liquid diet. PRN pain meds given for pain. Denies nausea. Pt able to verbalize needs/wants. Bed in low, locked position, call light and personal items within reach. VSS. Will cont to monitor.

## 2017-11-06 PROBLEM — E87.6 HYPOKALEMIA: Status: ACTIVE | Noted: 2017-11-06

## 2017-11-06 PROBLEM — R78.81 GRAM-NEGATIVE BACTEREMIA: Status: ACTIVE | Noted: 2017-11-06

## 2017-11-06 LAB
ALBUMIN SERPL BCP-MCNC: 2.6 G/DL
ALP SERPL-CCNC: 87 U/L
ALT SERPL W/O P-5'-P-CCNC: 71 U/L
ANION GAP SERPL CALC-SCNC: 7 MMOL/L
AST SERPL-CCNC: 82 U/L
BACTERIA UR CULT: NORMAL
BACTERIA UR CULT: NORMAL
BASOPHILS # BLD AUTO: 0.02 K/UL
BASOPHILS NFR BLD: 0.2 %
BILIRUB SERPL-MCNC: 0.7 MG/DL
BUN SERPL-MCNC: 5 MG/DL
CALCIUM SERPL-MCNC: 8.4 MG/DL
CHLORIDE SERPL-SCNC: 106 MMOL/L
CK MB SERPL-MCNC: 1.2 NG/ML
CK MB SERPL-RTO: 0.4 %
CK SERPL-CCNC: 331 U/L
CO2 SERPL-SCNC: 23 MMOL/L
CREAT SERPL-MCNC: 0.8 MG/DL
DIFFERENTIAL METHOD: ABNORMAL
EOSINOPHIL # BLD AUTO: 0.1 K/UL
EOSINOPHIL NFR BLD: 0.8 %
ERYTHROCYTE [DISTWIDTH] IN BLOOD BY AUTOMATED COUNT: 14.2 %
EST. GFR  (AFRICAN AMERICAN): >60 ML/MIN/1.73 M^2
EST. GFR  (NON AFRICAN AMERICAN): >60 ML/MIN/1.73 M^2
GLUCOSE SERPL-MCNC: 106 MG/DL
HCT VFR BLD AUTO: 35.6 %
HGB BLD-MCNC: 11.6 G/DL
LYMPHOCYTES # BLD AUTO: 0.7 K/UL
LYMPHOCYTES NFR BLD: 7 %
MCH RBC QN AUTO: 28.6 PG
MCHC RBC AUTO-ENTMCNC: 32.6 G/DL
MCV RBC AUTO: 88 FL
MONOCYTES # BLD AUTO: 0.6 K/UL
MONOCYTES NFR BLD: 6.7 %
NEUTROPHILS # BLD AUTO: 8.2 K/UL
NEUTROPHILS NFR BLD: 85.3 %
PLATELET # BLD AUTO: 185 K/UL
PMV BLD AUTO: 9.9 FL
POTASSIUM SERPL-SCNC: 3.4 MMOL/L
PROT SERPL-MCNC: 6 G/DL
RBC # BLD AUTO: 4.05 M/UL
SODIUM SERPL-SCNC: 136 MMOL/L
TROPONIN I SERPL DL<=0.01 NG/ML-MCNC: <0.006 NG/ML
WBC # BLD AUTO: 9.61 K/UL

## 2017-11-06 PROCEDURE — 96372 THER/PROPH/DIAG INJ SC/IM: CPT

## 2017-11-06 PROCEDURE — 21400001 HC TELEMETRY ROOM

## 2017-11-06 PROCEDURE — 93005 ELECTROCARDIOGRAM TRACING: CPT

## 2017-11-06 PROCEDURE — 36415 COLL VENOUS BLD VENIPUNCTURE: CPT

## 2017-11-06 PROCEDURE — 25000003 PHARM REV CODE 250: Performed by: NURSE PRACTITIONER

## 2017-11-06 PROCEDURE — 63600175 PHARM REV CODE 636 W HCPCS: Performed by: INTERNAL MEDICINE

## 2017-11-06 PROCEDURE — 99900035 HC TECH TIME PER 15 MIN (STAT)

## 2017-11-06 PROCEDURE — 85025 COMPLETE CBC W/AUTO DIFF WBC: CPT

## 2017-11-06 PROCEDURE — 82553 CREATINE MB FRACTION: CPT

## 2017-11-06 PROCEDURE — 63600175 PHARM REV CODE 636 W HCPCS: Performed by: NURSE PRACTITIONER

## 2017-11-06 PROCEDURE — 84484 ASSAY OF TROPONIN QUANT: CPT

## 2017-11-06 PROCEDURE — S0030 INJECTION, METRONIDAZOLE: HCPCS | Performed by: INTERNAL MEDICINE

## 2017-11-06 PROCEDURE — 25000003 PHARM REV CODE 250: Performed by: INTERNAL MEDICINE

## 2017-11-06 PROCEDURE — 63600175 PHARM REV CODE 636 W HCPCS: Performed by: EMERGENCY MEDICINE

## 2017-11-06 PROCEDURE — S4991 NICOTINE PATCH NONLEGEND: HCPCS | Performed by: NURSE PRACTITIONER

## 2017-11-06 PROCEDURE — 93010 ELECTROCARDIOGRAM REPORT: CPT | Mod: ,,, | Performed by: INTERNAL MEDICINE

## 2017-11-06 PROCEDURE — 80053 COMPREHEN METABOLIC PANEL: CPT

## 2017-11-06 RX ORDER — NITROGLYCERIN 0.4 MG/1
0.4 TABLET SUBLINGUAL EVERY 5 MIN PRN
Status: DISCONTINUED | OUTPATIENT
Start: 2017-11-06 | End: 2017-11-09 | Stop reason: HOSPADM

## 2017-11-06 RX ORDER — SODIUM CHLORIDE 9 MG/ML
INJECTION, SOLUTION INTRAVENOUS CONTINUOUS
Status: DISCONTINUED | OUTPATIENT
Start: 2017-11-06 | End: 2017-11-09 | Stop reason: HOSPADM

## 2017-11-06 RX ORDER — POTASSIUM CHLORIDE 20 MEQ/1
40 TABLET, EXTENDED RELEASE ORAL ONCE
Status: COMPLETED | OUTPATIENT
Start: 2017-11-06 | End: 2017-11-06

## 2017-11-06 RX ORDER — ACETAMINOPHEN 325 MG/1
650 TABLET ORAL EVERY 6 HOURS PRN
Status: DISCONTINUED | OUTPATIENT
Start: 2017-11-06 | End: 2017-11-09 | Stop reason: HOSPADM

## 2017-11-06 RX ORDER — LISINOPRIL 20 MG/1
40 TABLET ORAL DAILY
Status: DISCONTINUED | OUTPATIENT
Start: 2017-11-06 | End: 2017-11-09 | Stop reason: HOSPADM

## 2017-11-06 RX ADMIN — ONDANSETRON 8 MG: 8 TABLET, ORALLY DISINTEGRATING ORAL at 05:11

## 2017-11-06 RX ADMIN — MORPHINE SULFATE 4 MG: 4 INJECTION, SOLUTION INTRAMUSCULAR; INTRAVENOUS at 01:11

## 2017-11-06 RX ADMIN — NITROGLYCERIN 0.4 MG: 0.4 TABLET SUBLINGUAL at 10:11

## 2017-11-06 RX ADMIN — METRONIDAZOLE 500 MG: 500 INJECTION, SOLUTION INTRAVENOUS at 10:11

## 2017-11-06 RX ADMIN — SODIUM CHLORIDE: 0.9 INJECTION, SOLUTION INTRAVENOUS at 11:11

## 2017-11-06 RX ADMIN — CIPROFLOXACIN 400 MG: 2 INJECTION, SOLUTION INTRAVENOUS at 11:11

## 2017-11-06 RX ADMIN — HEPARIN SODIUM 7500 UNITS: 5000 INJECTION, SOLUTION INTRAVENOUS; SUBCUTANEOUS at 02:11

## 2017-11-06 RX ADMIN — MORPHINE SULFATE 4 MG: 4 INJECTION, SOLUTION INTRAMUSCULAR; INTRAVENOUS at 09:11

## 2017-11-06 RX ADMIN — PROMETHAZINE HYDROCHLORIDE 12.5 MG: 25 INJECTION INTRAMUSCULAR; INTRAVENOUS at 09:11

## 2017-11-06 RX ADMIN — NICOTINE 1 PATCH: 21 PATCH, EXTENDED RELEASE TRANSDERMAL at 11:11

## 2017-11-06 RX ADMIN — METRONIDAZOLE 500 MG: 500 INJECTION, SOLUTION INTRAVENOUS at 05:11

## 2017-11-06 RX ADMIN — POTASSIUM CHLORIDE 40 MEQ: 1500 TABLET, EXTENDED RELEASE ORAL at 09:11

## 2017-11-06 RX ADMIN — MORPHINE SULFATE 4 MG: 4 INJECTION, SOLUTION INTRAMUSCULAR; INTRAVENOUS at 05:11

## 2017-11-06 RX ADMIN — MORPHINE SULFATE 4 MG: 4 INJECTION, SOLUTION INTRAMUSCULAR; INTRAVENOUS at 07:11

## 2017-11-06 RX ADMIN — HEPARIN SODIUM 7500 UNITS: 5000 INJECTION, SOLUTION INTRAVENOUS; SUBCUTANEOUS at 05:11

## 2017-11-06 RX ADMIN — FAMOTIDINE 20 MG: 20 TABLET ORAL at 09:11

## 2017-11-06 RX ADMIN — HEPARIN SODIUM 7500 UNITS: 5000 INJECTION, SOLUTION INTRAVENOUS; SUBCUTANEOUS at 09:11

## 2017-11-06 RX ADMIN — METRONIDAZOLE 500 MG: 500 INJECTION, SOLUTION INTRAVENOUS at 01:11

## 2017-11-06 RX ADMIN — LISINOPRIL 40 MG: 20 TABLET ORAL at 11:11

## 2017-11-06 RX ADMIN — ONDANSETRON 8 MG: 8 TABLET, ORALLY DISINTEGRATING ORAL at 04:11

## 2017-11-06 RX ADMIN — FAMOTIDINE 20 MG: 20 TABLET ORAL at 08:11

## 2017-11-06 RX ADMIN — ACETAMINOPHEN 650 MG: 325 TABLET ORAL at 04:11

## 2017-11-06 RX ADMIN — MORPHINE SULFATE 4 MG: 4 INJECTION, SOLUTION INTRAMUSCULAR; INTRAVENOUS at 02:11

## 2017-11-06 NOTE — ASSESSMENT & PLAN NOTE
- CT abdomen/pelvis obtained upon admission -- sigmoid diverticulosis with mild wall thickening and pericolonic inflammatory change consistent with acute uncomplicated diverticulitis  - Blood cultures -- positive for gram negative rods, sensitivities pending  - Continue IV Ciprofloxacin and Flagyl  - Analgesic prn  - Continue clear liquid diet

## 2017-11-06 NOTE — HOSPITAL COURSE
Laura Vora is a 47 year old female admitted for Severe sepsis. Upon admission -- lactic acid 3.4, WBC 22.93, and tachycardiac 121 BPM. CT abdomen/pelvis without constrast revealed sigmoid diverticulosis with mild wall thickening and pericolonic inflammatory change consistent with acute uncomplicated diverticulitis. Blood cultures collected -- E. Coli, sensitive to Ciprofloxacin. Pt received IV Flagyl and Cipro for diverticulitis. ID consulted for gram negative bacteremia and recommended CT abdomen/pelvis with contrast to r/o abscess which did not show an abscess. Repeat blood cultures with NGTD. Lactic acid has normalized and leukocytosis has resolved. Pt tolerated advanced diet. Early AM hours on 11/8/17, pt c/o chest pain which was relieved by SL Nitro. EKG NSR. Troponin negative. Pt currently denies chest pain at this time. Pt denies family hx of colon cancer. First degree relative (sister) had polyps. Pt has never had colonoscopy. Referral sent to [facility] for colonoscopy. Pt will follow up with PCP within 3-5 days after discharge for hospital follow up. This patient was seen and examined on the date of discharge and determined suitable for discharge.

## 2017-11-06 NOTE — PROGRESS NOTES
[facility] Hospital Medicine  Progress Note    Patient Name: Laura Vora  MRN: [id number]  Patient Class: IP- Inpatient   Admission Date: 11/5/2017  Length of Stay: 1 days  Attending Physician: Reynaldo Heck MD  Primary Care Provider: JOVON Aviles        Subjective:     Principal Problem:Severe sepsis    HPI:  Mr Vora is a 47 year old female with PMHx of HTN, tobacco abuse, morbid obesity, appendectomy and cholecystectomy. She presented to [facility] Emergency Room with complaints of severe lower abdominal pain that started last night. Associated symptoms include fever, chills, nausea and vomiting. Denies associated symptoms of chest pain, shortness of breath, palpitations, dizziness, constipation, bloody stools or syncope. CT of abdomen today showed sigmoid diverticulosis with mild wall thickening and pericolonic inflammatory change consistent with acute uncomplicated diverticulitis. WBC 22.93, Lactic acid 3.4.       Hospital Course:  Laura Vora is a 47 year old female admitted for Severe sepsis. Upon admission -- lactic acid 3.4, WBC 22.93, and tachycardiac 121 BPM. CT abdomen/pelvis revealed sigmoid diverticulosis with mild wall thickening and pericolonic inflammatory change consistent with acute uncomplicated diverticulitis. Blood cultures collected -- gram negative rods, sensitivities pending. Pt currently on IV Flagyl and Cipro for diverticulitis.     Interval History: Pt seen and examined. Pt currently c/o generalized abdominal pain and nausea. Pt rates 8/10 on pain scale. Nurse at bedside to administer prn pain and nausea medication.     Review of Systems   Constitutional: Positive for activity change and appetite change.   HENT: Negative.    Eyes: Negative.    Respiratory: Negative for apnea, cough, choking, chest tightness, shortness of breath, wheezing and stridor.    Cardiovascular: Negative for chest pain, palpitations and leg swelling.   Gastrointestinal: Positive  for abdominal pain and nausea. Negative for blood in stool, constipation, diarrhea, rectal pain and vomiting.   Endocrine: Negative.    Genitourinary: Negative.    Musculoskeletal: Negative.    Skin: Negative.    Allergic/Immunologic: Negative.    Neurological: Negative for dizziness, tremors, seizures, syncope, facial asymmetry, speech difficulty, weakness, light-headedness, numbness and headaches.   Hematological: Negative.    Psychiatric/Behavioral: Negative.      Objective:     Vital Signs (Most Recent):  Temp: 98.6 °F (37 °C) (11/06/17 0816)  Pulse: 102 (11/06/17 0816)  Resp: 18 (11/06/17 0816)  BP: 129/72 (11/06/17 0816)  SpO2: 99 % (11/06/17 0816) Vital Signs (24h Range):  Temp:  [98.1 °F (36.7 °C)-100.1 °F (37.8 °C)] 98.6 °F (37 °C)  Pulse:  [] 102  Resp:  [12-20] 18  SpO2:  [94 %-99 %] 99 %  BP: (115-137)/(56-94) 129/72     Weight: (!) 143.6 kg (316 lb 9.3 oz)  Body mass index is 51.1 kg/m².    Intake/Output Summary (Last 24 hours) at 11/06/17 1043  Last data filed at 11/06/17 0828   Gross per 24 hour   Intake          3180.83 ml   Output                0 ml   Net          3180.83 ml      Physical Exam   Constitutional: She is oriented to person, place, and time. She appears well-developed and well-nourished. No distress.   HENT:   Head: Normocephalic and atraumatic.   Eyes: Conjunctivae are normal.   Neck: Neck supple.   Cardiovascular: Normal rate, regular rhythm, normal heart sounds and intact distal pulses.    No murmur heard.  Pulmonary/Chest: Effort normal and breath sounds normal. No respiratory distress.   Abdominal: Soft. Bowel sounds are normal. She exhibits no distension. There is generalized tenderness. There is no rebound and no guarding.   Genitourinary:   Genitourinary Comments: Deferred   Musculoskeletal: Normal range of motion.   Neurological: She is alert and oriented to person, place, and time.   Skin: Skin is warm and dry. Capillary refill takes less than 2 seconds.   Psychiatric:  She has a normal mood and affect. Her behavior is normal. Judgment and thought content normal.   Nursing note and vitals reviewed.      Significant Labs:   CBC:   Recent Labs  Lab 11/05/17  1059 11/06/17  0438   WBC 22.93* 9.61   HGB 13.2 11.6*   HCT 39.4 35.6*    185     CMP:   Recent Labs  Lab 11/05/17  1059 11/06/17  0438   * 136   K 4.1 3.4*    106   CO2 22* 23    106   BUN 7 5*   CREATININE 1.0 0.8   CALCIUM 9.6 8.4*   PROT 7.7 6.0   ALBUMIN 3.4* 2.6*   BILITOT 0.7 0.7   ALKPHOS 85 87   AST 52* 82*   ALT 47* 71*   ANIONGAP 11 7*   EGFRNONAA >60 >60       Significant Imaging: I have reviewed all pertinent imaging results/findings within the past 24 hours.    Assessment/Plan:      * Severe sepsis    - Resolved  - Lactic acid has normalized  - Leukocytosis resolved  - Blood cultures -- gram negative rods, sensitives pending  - Continue IV Flagyl and Ciprofloxacin          Diverticulitis of large intestine without perforation or abscess    - CT abdomen/pelvis obtained upon admission -- sigmoid diverticulosis with mild wall thickening and pericolonic inflammatory change consistent with acute uncomplicated diverticulitis  - Blood cultures -- positive for gram negative rods, sensitivities pending  - Continue IV Ciprofloxacin and Flagyl  - Analgesic prn  - Continue clear liquid diet         Gram-negative bacteremia    - Blood cultures obtained upon admission -- gram negative rods, sensitives pending  - Continue IV Flagyl and Ciprofloxacin        Hypokalemia    - KCL 40 mEq PO x 1  - Repeat BMP in AM        Tobacco abuse    - Pt counseled on smoking cessation, pt verbalized understanding  - Nicotine patch           Essential hypertension    - BP stable  - Hold Hctz  - Continue ACEI  - Will continue to monitor          VTE Risk Mitigation         Ordered     heparin (porcine) injection 7,500 Units  Every 8 hours     Route:  Subcutaneous        11/05/17 1252     Medium Risk of VTE  Once       11/05/17 1252              FARAZ Whyte  Department of Hospital Medicine   [facility]

## 2017-11-06 NOTE — PLAN OF CARE
Pt remains free of injuries. C/o LLQ abd pain, intermittent, sharp, worse c palpation and activity. Moderately controlled c PRN meds and relaxation techniques. IVF and antibiotics per MD orders. C/o ant HA. Tylenol ordered PRN. Oral temp 100.8. Sintia Romero NP notified. SR to ST on tele. Will continue to monitor.

## 2017-11-06 NOTE — PLAN OF CARE
CM met with patient at the bedside to assess for discharge needs.  Patient states that she lives at home with daughter.  She is independent and does not anticipate any discharge needs.  CM provided a transitional care folder, information on advanced directives, information on pharmacy bedside delivery, and discharge planning begins on admission with contact information for AlexBERNIE    D/C Plan:  Home with no needs    ROBERT handed off to Alex BERNIE     11/06/17 0920   Discharge Assessment   Assessment Type Discharge Planning Assessment   Confirmed/corrected address and phone number on facesheet? Yes   Assessment information obtained from? Patient;Medical Record   Expected Length of Stay (days) (TBD)   Communicated expected length of stay with patient/caregiver yes   Prior to hospitilization cognitive status: Alert/Oriented   Prior to hospitalization functional status: Independent   Current cognitive status: Alert/Oriented   Current Functional Status: Independent   Facility Arrived From: home    Lives With child(tawanda), adult   Able to Return to Prior Arrangements yes   Is patient able to care for self after discharge? Yes   Who are your caregiver(s) and their phone number(s)? Feng Ridley, friend [phone]   Patient's perception of discharge disposition home or selfcare   Readmission Within The Last 30 Days no previous admission in last 30 days   Patient currently being followed by outpatient case management? No   Patient currently receives any other outside agency services? No   Equipment Currently Used at Home none   Do you have any problems affording any of your prescribed medications? No   Is the patient taking medications as prescribed? yes   Does the patient have transportation home? Yes   Transportation Available family or friend will provide;car   Dialysis Name and Scheduled days NA   Does the patient receive services at the [facility]? No   Discharge Plan A Home with family   Discharge Plan B Home with  family   Patient/Family In Agreement With Plan yes

## 2017-11-06 NOTE — NURSING
Notified Chuck Harvey NP of positive blood culture results. Gram negative rods from 2 anaerobes collected 11-5-17. Instructed to defer results to am provider. Pt is currently on Cipro and Flagyl.

## 2017-11-06 NOTE — PROGRESS NOTES
Tissue Perfusion Assessment        Vital signs reviewed. A focused perfusion assessment was completed.      Rohan Gallegos

## 2017-11-06 NOTE — ASSESSMENT & PLAN NOTE
- Blood cultures obtained upon admission -- gram negative rods, sensitives pending  - Continue IV Flagyl and Ciprofloxacin

## 2017-11-06 NOTE — ASSESSMENT & PLAN NOTE
- Resolved  - Lactic acid has normalized  - Leukocytosis resolved  - Blood cultures -- gram negative rods, sensitives pending  - Continue IV Flagyl and Ciprofloxacin

## 2017-11-06 NOTE — NURSING
Darryl Gallegos NP notified of 4 beat run of V-tach. Pt's only complaint is dizziness and headache at this time. Order for one time norco placed. Will monitor heart rhythm.

## 2017-11-06 NOTE — SUBJECTIVE & OBJECTIVE
Interval History: Pt seen and examined. Pt currently c/o generalized abdominal pain and nausea. Pt rates 8/10 on pain scale. Nurse at bedside to administer prn pain and nausea medication.     Review of Systems   Constitutional: Positive for activity change and appetite change.   HENT: Negative.    Eyes: Negative.    Respiratory: Negative for apnea, cough, choking, chest tightness, shortness of breath, wheezing and stridor.    Cardiovascular: Negative for chest pain, palpitations and leg swelling.   Gastrointestinal: Positive for abdominal pain and nausea. Negative for blood in stool, constipation, diarrhea, rectal pain and vomiting.   Endocrine: Negative.    Genitourinary: Negative.    Musculoskeletal: Negative.    Skin: Negative.    Allergic/Immunologic: Negative.    Neurological: Negative for dizziness, tremors, seizures, syncope, facial asymmetry, speech difficulty, weakness, light-headedness, numbness and headaches.   Hematological: Negative.    Psychiatric/Behavioral: Negative.      Objective:     Vital Signs (Most Recent):  Temp: 98.6 °F (37 °C) (11/06/17 0816)  Pulse: 102 (11/06/17 0816)  Resp: 18 (11/06/17 0816)  BP: 129/72 (11/06/17 0816)  SpO2: 99 % (11/06/17 0816) Vital Signs (24h Range):  Temp:  [98.1 °F (36.7 °C)-100.1 °F (37.8 °C)] 98.6 °F (37 °C)  Pulse:  [] 102  Resp:  [12-20] 18  SpO2:  [94 %-99 %] 99 %  BP: (115-137)/(56-94) 129/72     Weight: (!) 143.6 kg (316 lb 9.3 oz)  Body mass index is 51.1 kg/m².    Intake/Output Summary (Last 24 hours) at 11/06/17 1043  Last data filed at 11/06/17 0828   Gross per 24 hour   Intake          3180.83 ml   Output                0 ml   Net          3180.83 ml      Physical Exam   Constitutional: She is oriented to person, place, and time. She appears well-developed and well-nourished. No distress.   HENT:   Head: Normocephalic and atraumatic.   Eyes: Conjunctivae are normal.   Neck: Neck supple.   Cardiovascular: Normal rate, regular rhythm, normal heart  sounds and intact distal pulses.    No murmur heard.  Pulmonary/Chest: Effort normal and breath sounds normal. No respiratory distress.   Abdominal: Soft. Bowel sounds are normal. She exhibits no distension. There is generalized tenderness. There is no rebound and no guarding.   Genitourinary:   Genitourinary Comments: Deferred   Musculoskeletal: Normal range of motion.   Neurological: She is alert and oriented to person, place, and time.   Skin: Skin is warm and dry. Capillary refill takes less than 2 seconds.   Psychiatric: She has a normal mood and affect. Her behavior is normal. Judgment and thought content normal.   Nursing note and vitals reviewed.      Significant Labs:   CBC:   Recent Labs  Lab 11/05/17  1059 11/06/17  0438   WBC 22.93* 9.61   HGB 13.2 11.6*   HCT 39.4 35.6*    185     CMP:   Recent Labs  Lab 11/05/17  1059 11/06/17  0438   * 136   K 4.1 3.4*    106   CO2 22* 23    106   BUN 7 5*   CREATININE 1.0 0.8   CALCIUM 9.6 8.4*   PROT 7.7 6.0   ALBUMIN 3.4* 2.6*   BILITOT 0.7 0.7   ALKPHOS 85 87   AST 52* 82*   ALT 47* 71*   ANIONGAP 11 7*   EGFRNONAA >60 >60       Significant Imaging: I have reviewed all pertinent imaging results/findings within the past 24 hours.

## 2017-11-06 NOTE — PLAN OF CARE
Problem: Patient Care Overview  Goal: Plan of Care Review  Outcome: Ongoing (interventions implemented as appropriate)  Pain adequately controlled. Denies any nausea. NSR-ST on the monitor, besides one run of v-tach. See previous note. IVF infusing as ordered. Low-grade fever this shift. Chart reviewed. Will monitor.

## 2017-11-07 LAB
ALBUMIN SERPL BCP-MCNC: 2.7 G/DL
ALP SERPL-CCNC: 114 U/L
ALT SERPL W/O P-5'-P-CCNC: 66 U/L
ANION GAP SERPL CALC-SCNC: 7 MMOL/L
AST SERPL-CCNC: 51 U/L
BASOPHILS # BLD AUTO: 0.01 K/UL
BASOPHILS NFR BLD: 0.2 %
BILIRUB SERPL-MCNC: 0.6 MG/DL
BUN SERPL-MCNC: 4 MG/DL
CALCIUM SERPL-MCNC: 8.6 MG/DL
CHLORIDE SERPL-SCNC: 105 MMOL/L
CO2 SERPL-SCNC: 22 MMOL/L
CREAT SERPL-MCNC: 0.9 MG/DL
DIFFERENTIAL METHOD: ABNORMAL
EOSINOPHIL # BLD AUTO: 0.1 K/UL
EOSINOPHIL NFR BLD: 2 %
ERYTHROCYTE [DISTWIDTH] IN BLOOD BY AUTOMATED COUNT: 14.2 %
EST. GFR  (AFRICAN AMERICAN): >60 ML/MIN/1.73 M^2
EST. GFR  (NON AFRICAN AMERICAN): >60 ML/MIN/1.73 M^2
GLUCOSE SERPL-MCNC: 102 MG/DL
HCT VFR BLD AUTO: 36.1 %
HGB BLD-MCNC: 11.9 G/DL
LYMPHOCYTES # BLD AUTO: 1.1 K/UL
LYMPHOCYTES NFR BLD: 17.3 %
MAGNESIUM SERPL-MCNC: 1.6 MG/DL
MCH RBC QN AUTO: 28.6 PG
MCHC RBC AUTO-ENTMCNC: 33 G/DL
MCV RBC AUTO: 87 FL
MONOCYTES # BLD AUTO: 0.6 K/UL
MONOCYTES NFR BLD: 9 %
NEUTROPHILS # BLD AUTO: 4.7 K/UL
NEUTROPHILS NFR BLD: 71.5 %
PLATELET # BLD AUTO: 166 K/UL
PMV BLD AUTO: 9.7 FL
POTASSIUM SERPL-SCNC: 3.8 MMOL/L
PROT SERPL-MCNC: 6.5 G/DL
RBC # BLD AUTO: 4.16 M/UL
SODIUM SERPL-SCNC: 134 MMOL/L
TROPONIN I SERPL DL<=0.01 NG/ML-MCNC: 0.01 NG/ML
TROPONIN I SERPL DL<=0.01 NG/ML-MCNC: <0.006 NG/ML
WBC # BLD AUTO: 6.52 K/UL

## 2017-11-07 PROCEDURE — 63600175 PHARM REV CODE 636 W HCPCS: Performed by: FAMILY MEDICINE

## 2017-11-07 PROCEDURE — 83735 ASSAY OF MAGNESIUM: CPT

## 2017-11-07 PROCEDURE — 25000003 PHARM REV CODE 250: Performed by: NURSE PRACTITIONER

## 2017-11-07 PROCEDURE — 25000003 PHARM REV CODE 250: Performed by: INTERNAL MEDICINE

## 2017-11-07 PROCEDURE — 80053 COMPREHEN METABOLIC PANEL: CPT

## 2017-11-07 PROCEDURE — 84484 ASSAY OF TROPONIN QUANT: CPT

## 2017-11-07 PROCEDURE — S4991 NICOTINE PATCH NONLEGEND: HCPCS | Performed by: NURSE PRACTITIONER

## 2017-11-07 PROCEDURE — 96372 THER/PROPH/DIAG INJ SC/IM: CPT

## 2017-11-07 PROCEDURE — 85025 COMPLETE CBC W/AUTO DIFF WBC: CPT

## 2017-11-07 PROCEDURE — 21400001 HC TELEMETRY ROOM

## 2017-11-07 PROCEDURE — 36415 COLL VENOUS BLD VENIPUNCTURE: CPT

## 2017-11-07 PROCEDURE — 63600175 PHARM REV CODE 636 W HCPCS: Performed by: NURSE PRACTITIONER

## 2017-11-07 PROCEDURE — 63600175 PHARM REV CODE 636 W HCPCS: Performed by: INTERNAL MEDICINE

## 2017-11-07 PROCEDURE — 63600175 PHARM REV CODE 636 W HCPCS: Performed by: EMERGENCY MEDICINE

## 2017-11-07 PROCEDURE — S0030 INJECTION, METRONIDAZOLE: HCPCS | Performed by: INTERNAL MEDICINE

## 2017-11-07 RX ORDER — DIPHENHYDRAMINE HCL 50 MG
50 CAPSULE ORAL ONCE
Status: COMPLETED | OUTPATIENT
Start: 2017-11-08 | End: 2017-11-08

## 2017-11-07 RX ORDER — FAMOTIDINE 20 MG/1
20 TABLET, FILM COATED ORAL ONCE
Status: COMPLETED | OUTPATIENT
Start: 2017-11-08 | End: 2017-11-08

## 2017-11-07 RX ORDER — MORPHINE SULFATE 2 MG/ML
4 INJECTION, SOLUTION INTRAMUSCULAR; INTRAVENOUS EVERY 4 HOURS PRN
Status: DISCONTINUED | OUTPATIENT
Start: 2017-11-07 | End: 2017-11-09 | Stop reason: HOSPADM

## 2017-11-07 RX ORDER — DIPHENHYDRAMINE HYDROCHLORIDE 50 MG/ML
50 INJECTION INTRAMUSCULAR; INTRAVENOUS ONCE
Status: DISCONTINUED | OUTPATIENT
Start: 2017-11-07 | End: 2017-11-07

## 2017-11-07 RX ADMIN — CIPROFLOXACIN 400 MG: 2 INJECTION, SOLUTION INTRAVENOUS at 12:11

## 2017-11-07 RX ADMIN — FAMOTIDINE 20 MG: 20 TABLET ORAL at 09:11

## 2017-11-07 RX ADMIN — MORPHINE SULFATE 4 MG: 4 INJECTION, SOLUTION INTRAMUSCULAR; INTRAVENOUS at 05:11

## 2017-11-07 RX ADMIN — LISINOPRIL 40 MG: 20 TABLET ORAL at 09:11

## 2017-11-07 RX ADMIN — CIPROFLOXACIN 400 MG: 2 INJECTION, SOLUTION INTRAVENOUS at 11:11

## 2017-11-07 RX ADMIN — PROMETHAZINE HYDROCHLORIDE 12.5 MG: 25 INJECTION INTRAMUSCULAR; INTRAVENOUS at 05:11

## 2017-11-07 RX ADMIN — PROMETHAZINE HYDROCHLORIDE 12.5 MG: 25 INJECTION INTRAMUSCULAR; INTRAVENOUS at 01:11

## 2017-11-07 RX ADMIN — HYDROCORTISONE SODIUM SUCCINATE 200 MG: 100 INJECTION, POWDER, FOR SOLUTION INTRAMUSCULAR; INTRAVENOUS at 05:11

## 2017-11-07 RX ADMIN — METRONIDAZOLE 500 MG: 500 INJECTION, SOLUTION INTRAVENOUS at 05:11

## 2017-11-07 RX ADMIN — HYDROCORTISONE SODIUM SUCCINATE 200 MG: 100 INJECTION, POWDER, FOR SOLUTION INTRAMUSCULAR; INTRAVENOUS at 01:11

## 2017-11-07 RX ADMIN — NICOTINE 1 PATCH: 21 PATCH, EXTENDED RELEASE TRANSDERMAL at 09:11

## 2017-11-07 RX ADMIN — SODIUM CHLORIDE: 0.9 INJECTION, SOLUTION INTRAVENOUS at 11:11

## 2017-11-07 RX ADMIN — HEPARIN SODIUM 7500 UNITS: 5000 INJECTION, SOLUTION INTRAVENOUS; SUBCUTANEOUS at 05:11

## 2017-11-07 RX ADMIN — MORPHINE SULFATE 4 MG: 4 INJECTION, SOLUTION INTRAMUSCULAR; INTRAVENOUS at 12:11

## 2017-11-07 RX ADMIN — HYDROCORTISONE SODIUM SUCCINATE 200 MG: 100 INJECTION, POWDER, FOR SOLUTION INTRAMUSCULAR; INTRAVENOUS at 09:11

## 2017-11-07 RX ADMIN — MORPHINE SULFATE 4 MG: 2 INJECTION, SOLUTION INTRAMUSCULAR; INTRAVENOUS at 05:11

## 2017-11-07 RX ADMIN — ZOLPIDEM TARTRATE 5 MG: 5 TABLET, FILM COATED ORAL at 09:11

## 2017-11-07 RX ADMIN — METRONIDAZOLE 500 MG: 500 INJECTION, SOLUTION INTRAVENOUS at 09:11

## 2017-11-07 RX ADMIN — FAMOTIDINE 20 MG: 20 TABLET ORAL at 08:11

## 2017-11-07 RX ADMIN — MORPHINE SULFATE 4 MG: 4 INJECTION, SOLUTION INTRAMUSCULAR; INTRAVENOUS at 11:11

## 2017-11-07 RX ADMIN — MORPHINE SULFATE 4 MG: 2 INJECTION, SOLUTION INTRAMUSCULAR; INTRAVENOUS at 09:11

## 2017-11-07 RX ADMIN — METRONIDAZOLE 500 MG: 500 INJECTION, SOLUTION INTRAVENOUS at 01:11

## 2017-11-07 RX ADMIN — ACETAMINOPHEN 650 MG: 325 TABLET ORAL at 05:11

## 2017-11-07 RX ADMIN — HEPARIN SODIUM 7500 UNITS: 5000 INJECTION, SOLUTION INTRAVENOUS; SUBCUTANEOUS at 09:11

## 2017-11-07 NOTE — PLAN OF CARE
Met with patient at bedside.  Patient states that MDs here are currently determining on which antibiotic she will need to be discharged.  Explained to patient that should she require any IV antibitiocs upon discharge, this  will work with her and arrange.  Also, discussed with patient whether she has considered stopping smoking.  Patient reports that she has been smoking since age 23 and currently smokes 1PPD.  Patient indicates being interested in smoking cessation and states that the patch she is currently wearing has completely curbed her desire to smoke.  Provided patient with [facility] Sm[facility] Cessation Information.  Patient reports having no other needs or concerns at this time.      PLAN:  Continue to follow

## 2017-11-07 NOTE — ASSESSMENT & PLAN NOTE
- CT abdomen/pelvis without contrast obtained upon admission -- sigmoid diverticulosis with mild wall thickening and pericolonic inflammatory change consistent with acute uncomplicated diverticulitis  - Blood cultures -- positive for gram negative rods, lactose  - sensitivities pending  - Continue IV Ciprofloxacin and Flagyl  - Analgesic prn  - Continue clear liquid diet, will likely advance tomorrow, 11/8/17

## 2017-11-07 NOTE — ASSESSMENT & PLAN NOTE
- Blood cultures obtained upon admission -- gram negative rods, lactose  - sensitives pending  - ID consulted for gram negative bacteremia and recommended CT abdomen/pelvis with contrast to r/o abscess  - Continue IV Flagyl and Ciprofloxacin

## 2017-11-07 NOTE — PROGRESS NOTES
EKG performed on pt as ordered by MD. 'Normal EKG' resulted. Unconfirmed copy labeled with pt ID sticker, placed in pt chart and Asleigh, patient's nurse, notified by RT.

## 2017-11-07 NOTE — ASSESSMENT & PLAN NOTE
- Resolved  - Lactic acid has normalized  - Leukocytosis resolved  - Blood cultures -- gram negative rods, lactose  - sensitives pending  - ID consulted  - Continue IV Flagyl and Ciprofloxacin

## 2017-11-07 NOTE — SUBJECTIVE & OBJECTIVE
Interval History: Pt seen and examined. Pt reports abdominal pain improving, currently rates 8/10 on pain scale. Pt also reports she is tolerating clear liquid diet.    Review of Systems   Constitutional: Positive for activity change and appetite change.   HENT: Negative.    Eyes: Negative.    Respiratory: Negative for apnea, cough, choking, chest tightness, shortness of breath, wheezing and stridor.    Cardiovascular: Negative for chest pain, palpitations and leg swelling.   Gastrointestinal: Positive for abdominal pain and nausea. Negative for blood in stool, constipation, diarrhea, rectal pain and vomiting.   Endocrine: Negative.    Genitourinary: Negative.    Musculoskeletal: Negative.    Skin: Negative.    Allergic/Immunologic: Negative.    Neurological: Negative for dizziness, tremors, seizures, syncope, facial asymmetry, speech difficulty, weakness, light-headedness, numbness and headaches.   Hematological: Negative.    Psychiatric/Behavioral: Negative.      Objective:     Vital Signs (Most Recent):  Temp: 98.4 °F (36.9 °C) (11/07/17 1157)  Pulse: 81 (11/07/17 1157)  Resp: 18 (11/07/17 1157)  BP: 128/66 (11/07/17 1157)  SpO2: 97 % (11/07/17 1157) Vital Signs (24h Range):  Temp:  [98.4 °F (36.9 °C)-100.2 °F (37.9 °C)] 98.4 °F (36.9 °C)  Pulse:  [] 81  Resp:  [18-20] 18  SpO2:  [93 %-97 %] 97 %  BP: (125-138)/(66-78) 128/66     Weight: (!) 143.6 kg (316 lb 9.3 oz)  Body mass index is 51.1 kg/m².    Intake/Output Summary (Last 24 hours) at 11/07/17 1158  Last data filed at 11/07/17 0600   Gross per 24 hour   Intake          3396.66 ml   Output                0 ml   Net          3396.66 ml      Physical Exam   Constitutional: She is oriented to person, place, and time. She appears well-developed and well-nourished. No distress.   HENT:   Head: Normocephalic and atraumatic.   Eyes: Conjunctivae are normal.   Neck: Neck supple.   Cardiovascular: Normal rate, regular rhythm, normal heart sounds and intact  distal pulses.    No murmur heard.  Pulmonary/Chest: Effort normal and breath sounds normal. No respiratory distress.   Abdominal: Soft. Bowel sounds are normal. She exhibits no distension. There is generalized tenderness. There is no rebound and no guarding.   Genitourinary:   Genitourinary Comments: Deferred   Musculoskeletal: Normal range of motion.   Neurological: She is alert and oriented to person, place, and time.   Skin: Skin is warm and dry. Capillary refill takes less than 2 seconds.   Psychiatric: She has a normal mood and affect. Her behavior is normal. Judgment and thought content normal.   Nursing note and vitals reviewed.      Significant Labs:   CBC:   Recent Labs  Lab 11/06/17 0438 11/07/17  0519   WBC 9.61 6.52   HGB 11.6* 11.9*   HCT 35.6* 36.1*    166     CMP:   Recent Labs  Lab 11/06/17 0438 11/07/17  0519    134*   K 3.4* 3.8    105   CO2 23 22*    102   BUN 5* 4*   CREATININE 0.8 0.9   CALCIUM 8.4* 8.6*   PROT 6.0 6.5   ALBUMIN 2.6* 2.7*   BILITOT 0.7 0.6   ALKPHOS 87 114   AST 82* 51*   ALT 71* 66*   ANIONGAP 7* 7*   EGFRNONAA >60 >60       Significant Imaging: I have reviewed all pertinent imaging results/findings within the past 24 hours.

## 2017-11-07 NOTE — PROGRESS NOTES
Pt reports emesis x1, moderate amount of clear output. Zofran given per MD orders. Will continue to monitor.

## 2017-11-07 NOTE — PLAN OF CARE
Problem: Patient Care Overview  Goal: Plan of Care Review  Outcome: Ongoing (interventions implemented as appropriate)  No acute distress or injury noted to patient.  No acute distress noted.  Safety and fall precautions reviewed and discussed with patient.  Safety and fall precautions reviewed and discussed with patient.  Patient free from injury.  Pain managed adequately.  IV hydration maintained.  Oral hydration and ambulation promoted.  Call bell and personal items in reach.  Bed in low position and locked.  Side rails up x2.  Encouraged to call if any assistance needed.  Will continue to monitor.

## 2017-11-07 NOTE — PROGRESS NOTES
Pt verbalized relief from one administration of Nitroglycerin SL. Notified Flavia, NP ECG, CPK, and Troponin results. No new orders. Continue to monitor.

## 2017-11-07 NOTE — PROGRESS NOTES
Pt report pain 9.5/10 to chest area. Pt describes pain as crushing and radiating to left back. VSS. Notifed on-call physician. Will continue to monitor.

## 2017-11-07 NOTE — PLAN OF CARE
Problem: Patient Care Overview  Goal: Plan of Care Review  Outcome: Ongoing (interventions implemented as appropriate)  Pt denied chest pain after one administration of nitro SL. VSS. NSR in 90's during shift. Patient reported no relief from Zofran. N/V controlled moderately with Phenergan. Pain controlled with IV Morphine. Family at bedside.   Fall precautions in place. Side rails up. Bed locked and low in position. Call light and personal items within reach. 24 hour order chart check completed. Pt educated on medication's side effects. Pt verbalized understanding.   Will continue to monitor.

## 2017-11-07 NOTE — PROGRESS NOTES
[facility] Hospital Medicine  Progress Note    Patient Name: Laura Vora  MRN: [id number]  Patient Class: IP- Inpatient   Admission Date: 11/5/2017  Length of Stay: 2 days  Attending Physician: Reynaldo Heck MD  Primary Care Provider: JOVON Aviles        Subjective:     Principal Problem:Severe sepsis    HPI:  Mr Vora is a 47 year old female with PMHx of HTN, tobacco abuse, morbid obesity, appendectomy and cholecystectomy. She presented to [facility] Emergency Room with complaints of severe lower abdominal pain that started last night. Associated symptoms include fever, chills, nausea and vomiting. Denies associated symptoms of chest pain, shortness of breath, palpitations, dizziness, constipation, bloody stools or syncope. CT of abdomen today showed sigmoid diverticulosis with mild wall thickening and pericolonic inflammatory change consistent with acute uncomplicated diverticulitis. WBC 22.93, Lactic acid 3.4.       Hospital Course:  Laura Vora is a 47 year old female admitted for Severe sepsis. Upon admission -- lactic acid 3.4, WBC 22.93, and tachycardiac 121 BPM. CT abdomen/pelvis without constrast revealed sigmoid diverticulosis with mild wall thickening and pericolonic inflammatory change consistent with acute uncomplicated diverticulitis. Blood cultures collected -- gram negative rods, lactose  - sensitivities pending. Pt currently on IV Flagyl and Cipro for diverticulitis. ID consulted for gram negative bacteremia and recommended CT abdomen/pelvis with contrast to r/o abscess. Overnight pt c/o chest pain which was relieved by SL Nitro. EKG NSR. Troponin negative. Pt currently denies chest pain at this time.    Interval History: Pt seen and examined. Pt reports abdominal pain improving, currently rates 8/10 on pain scale. Pt also reports she is tolerating clear liquid diet.    Review of Systems   Constitutional: Positive for activity change and appetite change.    HENT: Negative.    Eyes: Negative.    Respiratory: Negative for apnea, cough, choking, chest tightness, shortness of breath, wheezing and stridor.    Cardiovascular: Negative for chest pain, palpitations and leg swelling.   Gastrointestinal: Positive for abdominal pain and nausea. Negative for blood in stool, constipation, diarrhea, rectal pain and vomiting.   Endocrine: Negative.    Genitourinary: Negative.    Musculoskeletal: Negative.    Skin: Negative.    Allergic/Immunologic: Negative.    Neurological: Negative for dizziness, tremors, seizures, syncope, facial asymmetry, speech difficulty, weakness, light-headedness, numbness and headaches.   Hematological: Negative.    Psychiatric/Behavioral: Negative.      Objective:     Vital Signs (Most Recent):  Temp: 98.4 °F (36.9 °C) (11/07/17 1157)  Pulse: 81 (11/07/17 1157)  Resp: 18 (11/07/17 1157)  BP: 128/66 (11/07/17 1157)  SpO2: 97 % (11/07/17 1157) Vital Signs (24h Range):  Temp:  [98.4 °F (36.9 °C)-100.2 °F (37.9 °C)] 98.4 °F (36.9 °C)  Pulse:  [] 81  Resp:  [18-20] 18  SpO2:  [93 %-97 %] 97 %  BP: (125-138)/(66-78) 128/66     Weight: (!) 143.6 kg (316 lb 9.3 oz)  Body mass index is 51.1 kg/m².    Intake/Output Summary (Last 24 hours) at 11/07/17 1158  Last data filed at 11/07/17 0600   Gross per 24 hour   Intake          3396.66 ml   Output                0 ml   Net          3396.66 ml      Physical Exam   Constitutional: She is oriented to person, place, and time. She appears well-developed and well-nourished. No distress.   HENT:   Head: Normocephalic and atraumatic.   Eyes: Conjunctivae are normal.   Neck: Neck supple.   Cardiovascular: Normal rate, regular rhythm, normal heart sounds and intact distal pulses.    No murmur heard.  Pulmonary/Chest: Effort normal and breath sounds normal. No respiratory distress.   Abdominal: Soft. Bowel sounds are normal. She exhibits no distension. There is generalized tenderness. There is no rebound and no guarding.    Genitourinary:   Genitourinary Comments: Deferred   Musculoskeletal: Normal range of motion.   Neurological: She is alert and oriented to person, place, and time.   Skin: Skin is warm and dry. Capillary refill takes less than 2 seconds.   Psychiatric: She has a normal mood and affect. Her behavior is normal. Judgment and thought content normal.   Nursing note and vitals reviewed.      Significant Labs:   CBC:   Recent Labs  Lab 11/06/17 0438 11/07/17  0519   WBC 9.61 6.52   HGB 11.6* 11.9*   HCT 35.6* 36.1*    166     CMP:   Recent Labs  Lab 11/06/17 0438 11/07/17  0519    134*   K 3.4* 3.8    105   CO2 23 22*    102   BUN 5* 4*   CREATININE 0.8 0.9   CALCIUM 8.4* 8.6*   PROT 6.0 6.5   ALBUMIN 2.6* 2.7*   BILITOT 0.7 0.6   ALKPHOS 87 114   AST 82* 51*   ALT 71* 66*   ANIONGAP 7* 7*   EGFRNONAA >60 >60       Significant Imaging: I have reviewed all pertinent imaging results/findings within the past 24 hours.    Assessment/Plan:      * Severe sepsis    - Resolved  - Lactic acid has normalized  - Leukocytosis resolved  - Blood cultures -- gram negative rods, lactose  - sensitivities pending  - ID consulted  - Continue IV Flagyl and Ciprofloxacin          Diverticulitis of large intestine without perforation or abscess    - CT abdomen/pelvis without contrast obtained upon admission -- sigmoid diverticulosis with mild wall thickening and pericolonic inflammatory change consistent with acute uncomplicated diverticulitis  - Blood cultures -- positive for gram negative rods, lactose  - sensitivities pending  - Continue IV Ciprofloxacin and Flagyl  - Analgesic prn  - Continue clear liquid diet, will likely advance tomorrow, 11/8/17        Gram-negative bacteremia    - Blood cultures obtained upon admission -- gram negative rods, lactose  - sensitives pending  - ID consulted for gram negative bacteremia and recommended CT abdomen/pelvis with contrast to r/o abscess  -  Continue IV Flagyl and Ciprofloxacin        Hypokalemia    - KCL 40 mEq PO x 1 on 11/6/17 -- repleted  - Will continue to monitor        Tobacco abuse    - Pt counseled on smoking cessation, pt verbalized understanding  - Nicotine patch         Essential hypertension    - BP stable  - Hold Hctz  - Continue ACEI  - Will continue to monitor            VTE Risk Mitigation         Ordered     heparin (porcine) injection 7,500 Units  Every 8 hours     Route:  Subcutaneous        11/05/17 1252     Medium Risk of VTE  Once      11/05/17 1252              FARAZ Whyte  Department of Hospital Medicine   [facility]

## 2017-11-08 PROBLEM — E87.6 HYPOKALEMIA: Status: RESOLVED | Noted: 2017-11-06 | Resolved: 2017-11-08

## 2017-11-08 LAB
ALBUMIN SERPL BCP-MCNC: 2.7 G/DL
ALP SERPL-CCNC: 125 U/L
ALT SERPL W/O P-5'-P-CCNC: 57 U/L
ANION GAP SERPL CALC-SCNC: 10 MMOL/L
AST SERPL-CCNC: 35 U/L
BASOPHILS # BLD AUTO: 0.01 K/UL
BASOPHILS NFR BLD: 0.1 %
BILIRUB SERPL-MCNC: 0.4 MG/DL
BUN SERPL-MCNC: 6 MG/DL
CALCIUM SERPL-MCNC: 9.3 MG/DL
CHLORIDE SERPL-SCNC: 106 MMOL/L
CO2 SERPL-SCNC: 20 MMOL/L
CREAT SERPL-MCNC: 0.9 MG/DL
DIFFERENTIAL METHOD: ABNORMAL
EOSINOPHIL # BLD AUTO: 0 K/UL
EOSINOPHIL NFR BLD: 0 %
ERYTHROCYTE [DISTWIDTH] IN BLOOD BY AUTOMATED COUNT: 13.7 %
EST. GFR  (AFRICAN AMERICAN): >60 ML/MIN/1.73 M^2
EST. GFR  (NON AFRICAN AMERICAN): >60 ML/MIN/1.73 M^2
GLUCOSE SERPL-MCNC: 155 MG/DL
HCT VFR BLD AUTO: 37.7 %
HGB BLD-MCNC: 12.7 G/DL
LYMPHOCYTES # BLD AUTO: 1.1 K/UL
LYMPHOCYTES NFR BLD: 11.6 %
MCH RBC QN AUTO: 29 PG
MCHC RBC AUTO-ENTMCNC: 33.7 G/DL
MCV RBC AUTO: 86 FL
MONOCYTES # BLD AUTO: 0.2 K/UL
MONOCYTES NFR BLD: 1.7 %
NEUTROPHILS # BLD AUTO: 8 K/UL
NEUTROPHILS NFR BLD: 86.6 %
PLATELET # BLD AUTO: 216 K/UL
PMV BLD AUTO: 10.4 FL
POTASSIUM SERPL-SCNC: 3.6 MMOL/L
PROT SERPL-MCNC: 6.9 G/DL
RBC # BLD AUTO: 4.38 M/UL
SODIUM SERPL-SCNC: 136 MMOL/L
WBC # BLD AUTO: 9.26 K/UL

## 2017-11-08 PROCEDURE — 25000003 PHARM REV CODE 250: Performed by: INTERNAL MEDICINE

## 2017-11-08 PROCEDURE — 25000003 PHARM REV CODE 250: Performed by: NURSE PRACTITIONER

## 2017-11-08 PROCEDURE — S0030 INJECTION, METRONIDAZOLE: HCPCS | Performed by: INTERNAL MEDICINE

## 2017-11-08 PROCEDURE — 36415 COLL VENOUS BLD VENIPUNCTURE: CPT

## 2017-11-08 PROCEDURE — 99900035 HC TECH TIME PER 15 MIN (STAT)

## 2017-11-08 PROCEDURE — 63600175 PHARM REV CODE 636 W HCPCS: Performed by: INTERNAL MEDICINE

## 2017-11-08 PROCEDURE — 80053 COMPREHEN METABOLIC PANEL: CPT

## 2017-11-08 PROCEDURE — 85025 COMPLETE CBC W/AUTO DIFF WBC: CPT

## 2017-11-08 PROCEDURE — 25500020 PHARM REV CODE 255: Performed by: FAMILY MEDICINE

## 2017-11-08 PROCEDURE — 21400001 HC TELEMETRY ROOM

## 2017-11-08 PROCEDURE — 63600175 PHARM REV CODE 636 W HCPCS: Performed by: NURSE PRACTITIONER

## 2017-11-08 PROCEDURE — 87040 BLOOD CULTURE FOR BACTERIA: CPT | Mod: 59

## 2017-11-08 PROCEDURE — S4991 NICOTINE PATCH NONLEGEND: HCPCS | Performed by: NURSE PRACTITIONER

## 2017-11-08 PROCEDURE — 63600175 PHARM REV CODE 636 W HCPCS: Performed by: FAMILY MEDICINE

## 2017-11-08 PROCEDURE — 96372 THER/PROPH/DIAG INJ SC/IM: CPT

## 2017-11-08 RX ORDER — METRONIDAZOLE 500 MG/100ML
500 INJECTION, SOLUTION INTRAVENOUS
Status: DISCONTINUED | OUTPATIENT
Start: 2017-11-08 | End: 2017-11-09

## 2017-11-08 RX ADMIN — SODIUM CHLORIDE: 0.9 INJECTION, SOLUTION INTRAVENOUS at 01:11

## 2017-11-08 RX ADMIN — FAMOTIDINE 20 MG: 20 TABLET ORAL at 12:11

## 2017-11-08 RX ADMIN — HEPARIN SODIUM 7500 UNITS: 5000 INJECTION, SOLUTION INTRAVENOUS; SUBCUTANEOUS at 05:11

## 2017-11-08 RX ADMIN — PROMETHAZINE HYDROCHLORIDE 12.5 MG: 25 INJECTION INTRAMUSCULAR; INTRAVENOUS at 10:11

## 2017-11-08 RX ADMIN — MORPHINE SULFATE 4 MG: 2 INJECTION, SOLUTION INTRAMUSCULAR; INTRAVENOUS at 03:11

## 2017-11-08 RX ADMIN — MORPHINE SULFATE 4 MG: 2 INJECTION, SOLUTION INTRAMUSCULAR; INTRAVENOUS at 04:11

## 2017-11-08 RX ADMIN — MORPHINE SULFATE 4 MG: 2 INJECTION, SOLUTION INTRAMUSCULAR; INTRAVENOUS at 11:11

## 2017-11-08 RX ADMIN — METRONIDAZOLE 500 MG: 500 INJECTION, SOLUTION INTRAVENOUS at 05:11

## 2017-11-08 RX ADMIN — NICOTINE 1 PATCH: 21 PATCH, EXTENDED RELEASE TRANSDERMAL at 08:11

## 2017-11-08 RX ADMIN — MORPHINE SULFATE 4 MG: 2 INJECTION, SOLUTION INTRAMUSCULAR; INTRAVENOUS at 07:11

## 2017-11-08 RX ADMIN — CIPROFLOXACIN 400 MG: 2 INJECTION, SOLUTION INTRAVENOUS at 12:11

## 2017-11-08 RX ADMIN — HEPARIN SODIUM 7500 UNITS: 5000 INJECTION, SOLUTION INTRAVENOUS; SUBCUTANEOUS at 04:11

## 2017-11-08 RX ADMIN — HEPARIN SODIUM 7500 UNITS: 5000 INJECTION, SOLUTION INTRAVENOUS; SUBCUTANEOUS at 09:11

## 2017-11-08 RX ADMIN — IOHEXOL 75 ML: 350 INJECTION, SOLUTION INTRAVENOUS at 02:11

## 2017-11-08 RX ADMIN — METRONIDAZOLE 500 MG: 500 INJECTION, SOLUTION INTRAVENOUS at 04:11

## 2017-11-08 RX ADMIN — PROMETHAZINE HYDROCHLORIDE 12.5 MG: 25 INJECTION INTRAMUSCULAR; INTRAVENOUS at 04:11

## 2017-11-08 RX ADMIN — DIPHENHYDRAMINE HYDROCHLORIDE 50 MG: 50 CAPSULE ORAL at 12:11

## 2017-11-08 RX ADMIN — FAMOTIDINE 20 MG: 20 TABLET ORAL at 09:11

## 2017-11-08 RX ADMIN — PREDNISONE 50 MG: 20 TABLET ORAL at 12:11

## 2017-11-08 RX ADMIN — SODIUM CHLORIDE: 0.9 INJECTION, SOLUTION INTRAVENOUS at 04:11

## 2017-11-08 RX ADMIN — CIPROFLOXACIN 400 MG: 2 INJECTION, SOLUTION INTRAVENOUS at 11:11

## 2017-11-08 RX ADMIN — ZOLPIDEM TARTRATE 5 MG: 5 TABLET, FILM COATED ORAL at 11:11

## 2017-11-08 RX ADMIN — LISINOPRIL 40 MG: 20 TABLET ORAL at 08:11

## 2017-11-08 RX ADMIN — MORPHINE SULFATE 4 MG: 2 INJECTION, SOLUTION INTRAMUSCULAR; INTRAVENOUS at 10:11

## 2017-11-08 RX ADMIN — METRONIDAZOLE 500 MG: 500 INJECTION, SOLUTION INTRAVENOUS at 09:11

## 2017-11-08 RX ADMIN — FAMOTIDINE 20 MG: 20 TABLET ORAL at 08:11

## 2017-11-08 RX ADMIN — PROMETHAZINE HYDROCHLORIDE 12.5 MG: 25 INJECTION INTRAMUSCULAR; INTRAVENOUS at 01:11

## 2017-11-08 RX ADMIN — IOHEXOL 150 ML: 350 INJECTION, SOLUTION INTRAVENOUS at 01:11

## 2017-11-08 NOTE — PROGRESS NOTES
[facility] Hospital Medicine  Progress Note    Patient Name: Laura Vora  MRN: [id number]  Patient Class: IP- Inpatient   Admission Date: 11/5/2017  Length of Stay: 3 days  Attending Physician: Reynaldo Heck MD  Primary Care Provider: JOVON Aviles        Subjective:     Principal Problem:Severe sepsis    HPI:  Mr Vora is a 47 year old female with PMHx of HTN, tobacco abuse, morbid obesity, appendectomy and cholecystectomy. She presented to [facility] Emergency Room with complaints of severe lower abdominal pain that started last night. Associated symptoms include fever, chills, nausea and vomiting. Denies associated symptoms of chest pain, shortness of breath, palpitations, dizziness, constipation, bloody stools or syncope. CT of abdomen today showed sigmoid diverticulosis with mild wall thickening and pericolonic inflammatory change consistent with acute uncomplicated diverticulitis. WBC 22.93, Lactic acid 3.4.       Hospital Course:  Laura Vora is a 47 year old female admitted for Severe sepsis. Upon admission -- lactic acid 3.4, WBC 22.93, and tachycardiac 121 BPM. CT abdomen/pelvis without constrast revealed sigmoid diverticulosis with mild wall thickening and pericolonic inflammatory change consistent with acute uncomplicated diverticulitis. Blood cultures collected -- gram negative rods, lactose  - sensitivities pending. Pt currently on IV Flagyl and Cipro for diverticulitis. ID consulted for gram negative bacteremia and recommended CT abdomen/pelvis with contrast to r/o abscess. Overnight pt c/o chest pain which was relieved by SL Nitro. EKG NSR. Troponin negative. Pt currently denies chest pain at this time.    11/8/17 -- No acute events overnight. CT abdomen/pelvis with contrast did not show abscess. Blood cultures -- E. Coli, sensitivities pending. Per Micro in [place], re running sensitivities but sensitive to Ciprofloxacin. Final sensitivities should be back by 2  PM. Will repeat blood cultures x 2 today and if negative will discharge tomorrow morning, 11/9/17. Pt denies family hx of colon cancer. First degree relative (sister) had polyps. Pt has never had colonoscopy. Pt will need to follow up with PCP upon discharge    Interval History: Pt seen and examined. Pt reports abdominal pain much improved and requesting diet be advanced.     Review of Systems   Constitutional: Positive for activity change and appetite change.   HENT: Negative.    Eyes: Negative.    Respiratory: Negative for apnea, cough, choking, chest tightness, shortness of breath, wheezing and stridor.    Cardiovascular: Negative for chest pain, palpitations and leg swelling.   Gastrointestinal: Positive for abdominal pain and nausea. Negative for blood in stool, constipation, diarrhea, rectal pain and vomiting.   Endocrine: Negative.    Genitourinary: Negative.    Musculoskeletal: Negative.    Skin: Negative.    Allergic/Immunologic: Negative.    Neurological: Negative for dizziness, tremors, seizures, syncope, facial asymmetry, speech difficulty, weakness, light-headedness, numbness and headaches.   Hematological: Negative.    Psychiatric/Behavioral: Negative.      Objective:     Vital Signs (Most Recent):  Temp: 97.7 °F (36.5 °C) (11/08/17 0731)  Pulse: 81 (11/08/17 0912)  Resp: 15 (11/08/17 0912)  BP: (!) 146/74 (11/08/17 0729)  SpO2: 98 % (11/08/17 0912) Vital Signs (24h Range):  Temp:  [96.9 °F (36.1 °C)-98.4 °F (36.9 °C)] 97.7 °F (36.5 °C)  Pulse:  [74-89] 81  Resp:  [12-18] 15  SpO2:  [95 %-98 %] 98 %  BP: (109-146)/(59-76) 146/74     Weight: (!) 143.6 kg (316 lb 9.3 oz)  Body mass index is 51.1 kg/m².    Intake/Output Summary (Last 24 hours) at 11/08/17 1208  Last data filed at 11/08/17 0604   Gross per 24 hour   Intake             3635 ml   Output                0 ml   Net             3635 ml      Physical Exam   Constitutional: She is oriented to person, place, and time. She appears well-developed and  well-nourished. No distress.   HENT:   Head: Normocephalic and atraumatic.   Eyes: Conjunctivae are normal.   Neck: Neck supple.   Cardiovascular: Normal rate, regular rhythm, normal heart sounds and intact distal pulses.    No murmur heard.  Pulmonary/Chest: Effort normal and breath sounds normal. No respiratory distress.   Abdominal: Soft. Bowel sounds are normal. She exhibits no distension. There is no tenderness. There is no rebound and no guarding.   Genitourinary:   Genitourinary Comments: Deferred   Musculoskeletal: Normal range of motion.   Neurological: She is alert and oriented to person, place, and time.   Skin: Skin is warm and dry. Capillary refill takes less than 2 seconds.   Psychiatric: She has a normal mood and affect. Her behavior is normal. Judgment and thought content normal.   Nursing note and vitals reviewed.      Significant Labs:   CBC:   Recent Labs  Lab 11/07/17 0519 11/08/17 0439   WBC 6.52 9.26   HGB 11.9* 12.7   HCT 36.1* 37.7    216     CMP:   Recent Labs  Lab 11/07/17 0519 11/08/17  0439   * 136   K 3.8 3.6    106   CO2 22* 20*    155*   BUN 4* 6   CREATININE 0.9 0.9   CALCIUM 8.6* 9.3   PROT 6.5 6.9   ALBUMIN 2.7* 2.7*   BILITOT 0.6 0.4   ALKPHOS 114 125   AST 51* 35   ALT 66* 57*   ANIONGAP 7* 10   EGFRNONAA >60 >60       Significant Imaging: I have reviewed all pertinent imaging results/findings within the past 24 hours.    Assessment/Plan:      * Severe sepsis    - Resolved  - Lactic acid has normalized  - Leukocytosis resolved  - Afebrile  - Blood cultures -- E. Coli - sensitives pending  - ID following  - Continue IV Flagyl and Ciprofloxacin          Diverticulitis of large intestine without perforation or abscess    - CT abdomen/pelvis without contrast obtained upon admission -- sigmoid diverticulosis with mild wall thickening and pericolonic inflammatory change consistent with acute uncomplicated diverticulitis  - Abdominal pain improving,  nausea/vomiting resolved -- diet advanced  - Blood cultures -- E.Coli - sensitivities pending  - Continue IV Ciprofloxacin and Flagyl  - Analgesic prn        Gram-negative bacteremia    - Blood cultures obtained upon admission -- E.Coli - sensitives pending  - ID following  - Obtain repeat blood cultures today  - CT abdomen/pelvis with contrast on 11/8/17 with no abscess  - Continue IV Flagyl and Ciprofloxacin        Bronchitis, chronic    No complaint of wheezing or SOB  Restart albuterol           Tobacco abuse    - Pt counseled on smoking cessation, pt verbalized understanding  - Nicotine patch         Essential hypertension    - BP stable  - Hold Hctz  - Continue ACEI  - Will continue to monitor          VTE Risk Mitigation         Ordered     heparin (porcine) injection 7,500 Units  Every 8 hours     Route:  Subcutaneous        11/05/17 1252     Medium Risk of VTE  Once      11/05/17 1252              FARAZ Whyte  Department of Hospital Medicine   [facility]

## 2017-11-08 NOTE — ASSESSMENT & PLAN NOTE
- Blood cultures obtained upon admission -- E.Coli - sensitives pending  - ID following  - Obtain repeat blood cultures today  - CT abdomen/pelvis with contrast on 11/8/17 with no abscess  - Continue IV Flagyl and Ciprofloxacin

## 2017-11-08 NOTE — SUBJECTIVE & OBJECTIVE
Past Medical History:   Diagnosis Date    Bronchitis     Diverticulitis     HTN (hypertension)     Obese     Tobacco use        Past Surgical History:   Procedure Laterality Date    APPENDECTOMY      CHOLECYSTECTOMY      HYSTERECTOMY         Review of patient's allergies indicates:   Allergen Reactions    Iodine and iodide containing products Hives    Bactrim [sulfamethoxazole-trimethoprim] Hives       Medications:  Prescriptions Prior to Admission   Medication Sig    albuterol 90 mcg/actuation inhaler Inhale 2 puffs into the lungs every 4 (four) hours as needed for Wheezing.    hydrochlorothiazide (HYDRODIURIL) 25 MG tablet Take 25 mg by mouth once daily.    lisinopril (PRINIVIL,ZESTRIL) 40 MG tablet Take 40 mg by mouth once daily.    guaifenesin-codeine 100-10 mg/5 ml (TUSSI-ORGANIDIN NR)  mg/5 mL syrup Take 5 mLs by mouth every 6 (six) hours as needed for Cough.    ibuprofen (ADVIL,MOTRIN) 800 MG tablet Take 1 tablet (800 mg total) by mouth every 6 (six) hours as needed for Pain.    ondansetron (ZOFRAN) 4 MG tablet Take 1 tablet (4 mg total) by mouth every 8 (eight) hours as needed.     Antibiotics     Start     Stop Route Frequency Ordered    11/06/17 0015  ciprofloxacin (CIPRO)400mg/200ml D5W IVPB 400 mg      -- IV Every 12 hours (non-standard times) 11/05/17 1252    11/05/17 1300  metronidazole IVPB 500 mg      -- IV Every 8 hours (non-standard times) 11/05/17 1152        Antifungals     None        Antivirals     None           Immunization History   Administered Date(s) Administered    Tdap 10/01/2017       Family History     Problem Relation (Age of Onset)    Cancer Sister, Paternal Aunt    Diabetes Mother    Heart disease Mother    Hypertension Mother        Social History     Social History    Marital status: Single     Spouse name: N/A    Number of children: N/A    Years of education: N/A     Social History Main Topics    Smoking status: Current Every Day Smoker     Packs/day:  0.50     Last attempt to quit: 1/16/2015    Smokeless tobacco: Never Used    Alcohol use Yes      Comment: occasionally    Drug use: No    Sexual activity: Not Asked     Other Topics Concern    None     Social History Narrative    None     Review of Systems   Constitutional: Positive for activity change and appetite change.   HENT: Negative.    Eyes: Negative.    Respiratory: Negative for apnea, cough, choking, chest tightness, shortness of breath, wheezing and stridor.    Cardiovascular: Negative for chest pain, palpitations and leg swelling.   Gastrointestinal: Positive for abdominal pain and nausea. Negative for blood in stool, constipation, diarrhea, rectal pain and vomiting.   Endocrine: Negative.    Genitourinary: Negative.    Musculoskeletal: Negative.    Skin: Negative.    Allergic/Immunologic: Negative.    Neurological: Negative for dizziness, tremors, seizures, syncope, facial asymmetry, speech difficulty, weakness, light-headedness, numbness and headaches.   Hematological: Negative.    Psychiatric/Behavioral: Negative.      Objective:     Vital Signs (Most Recent):  Temp: 97.9 °F (36.6 °C) (11/08/17 0434)  Pulse: 74 (11/08/17 0434)  Resp: 18 (11/08/17 0434)  BP: (!) 109/59 (11/08/17 0434)  SpO2: 95 % (11/08/17 0434) Vital Signs (24h Range):  Temp:  [96.9 °F (36.1 °C)-98.6 °F (37 °C)] 97.9 °F (36.6 °C)  Pulse:  [74-89] 74  Resp:  [12-20] 18  SpO2:  [95 %-97 %] 95 %  BP: (109-132)/(59-77) 109/59     Weight: (!) 143.6 kg (316 lb 9.3 oz)  Body mass index is 51.1 kg/m².    Estimated Creatinine Clearance: 115.9 mL/min (based on SCr of 0.9 mg/dL).    Physical Exam   Constitutional: She is oriented to person, place, and time. She appears well-developed and well-nourished. No distress.   HENT:   Head: Normocephalic and atraumatic.   Eyes: Conjunctivae are normal.   Neck: Neck supple.   Cardiovascular: Normal rate, regular rhythm, normal heart sounds and intact distal pulses.    No murmur  heard.  Pulmonary/Chest: Effort normal and breath sounds normal. No respiratory distress.   Abdominal: Soft. Bowel sounds are normal. She exhibits no distension. There is generalized tenderness. There is no rebound and no guarding.   Genitourinary:   Genitourinary Comments: Deferred   Musculoskeletal: Normal range of motion.   Neurological: She is alert and oriented to person, place, and time.   Skin: Skin is warm and dry. Capillary refill takes less than 2 seconds.   Psychiatric: She has a normal mood and affect. Her behavior is normal. Judgment and thought content normal.   Nursing note and vitals reviewed.      Significant Labs:   Blood Culture:   Recent Labs  Lab 11/05/17  1107 11/05/17  1124   LABBLOO Gram stain colton bottle: Gram negative rods   Results called to and read back by:Rylie Quintero RN 11/06/2017  05:21  Gram stain aer bottle: Gram negative rods 11/07/2017  18:35  GRAM NEGATIVE LESTER, LACTOSE FERMENTERIdentification and susceptibility pending Gram stain colton bottle: Gram negative rods   Results called to and read back by:Rylie Quintero RN 11/06/2017  05:21  Gram stain aer bottle: Gram negative rods   Positive results previously called 11/06/2017  12:50  ESCHERICHIA COLIFor susceptibility see order # [id number]       Significant Imaging: I have reviewed all pertinent imaging results/findings within the past 24 hours.

## 2017-11-08 NOTE — ASSESSMENT & PLAN NOTE
- CT abdomen/pelvis without contrast obtained upon admission -- sigmoid diverticulosis with mild wall thickening and pericolonic inflammatory change consistent with acute uncomplicated diverticulitis  - Abdominal pain improving, nausea/vomiting resolved -- diet advanced  - Blood cultures -- E.Coli - sensitivities pending  - Continue IV Ciprofloxacin and Flagyl  - Analgesic prn

## 2017-11-08 NOTE — HPI
47 year old female with history of HTN, tobacco abuse, morbid obesity, appendectomy and cholecystectomy. She presented to [facility] Emergency Room with complaints of severe lower abdominal pain that started a day prior to admission. Associated symptoms include fever, chills, nausea and vomiting.. CT of abdomen without contrast  showed sigmoid diverticulosis with mild wall thickening and pericolonic inflammatory change consistent with acute uncomplicated diverticulitis. WBC 22.93, Lactic acid 3.4.    Blood cultures-GNR.  Abdominal pain persists.

## 2017-11-08 NOTE — SUBJECTIVE & OBJECTIVE
Interval History: Pt seen and examined. Pt reports abdominal pain much improved and requesting diet be advanced.     Review of Systems   Constitutional: Positive for activity change and appetite change.   HENT: Negative.    Eyes: Negative.    Respiratory: Negative for apnea, cough, choking, chest tightness, shortness of breath, wheezing and stridor.    Cardiovascular: Negative for chest pain, palpitations and leg swelling.   Gastrointestinal: Positive for abdominal pain and nausea. Negative for blood in stool, constipation, diarrhea, rectal pain and vomiting.   Endocrine: Negative.    Genitourinary: Negative.    Musculoskeletal: Negative.    Skin: Negative.    Allergic/Immunologic: Negative.    Neurological: Negative for dizziness, tremors, seizures, syncope, facial asymmetry, speech difficulty, weakness, light-headedness, numbness and headaches.   Hematological: Negative.    Psychiatric/Behavioral: Negative.      Objective:     Vital Signs (Most Recent):  Temp: 97.7 °F (36.5 °C) (11/08/17 0731)  Pulse: 81 (11/08/17 0912)  Resp: 15 (11/08/17 0912)  BP: (!) 146/74 (11/08/17 0729)  SpO2: 98 % (11/08/17 0912) Vital Signs (24h Range):  Temp:  [96.9 °F (36.1 °C)-98.4 °F (36.9 °C)] 97.7 °F (36.5 °C)  Pulse:  [74-89] 81  Resp:  [12-18] 15  SpO2:  [95 %-98 %] 98 %  BP: (109-146)/(59-76) 146/74     Weight: (!) 143.6 kg (316 lb 9.3 oz)  Body mass index is 51.1 kg/m².    Intake/Output Summary (Last 24 hours) at 11/08/17 1208  Last data filed at 11/08/17 0604   Gross per 24 hour   Intake             3635 ml   Output                0 ml   Net             3635 ml      Physical Exam   Constitutional: She is oriented to person, place, and time. She appears well-developed and well-nourished. No distress.   HENT:   Head: Normocephalic and atraumatic.   Eyes: Conjunctivae are normal.   Neck: Neck supple.   Cardiovascular: Normal rate, regular rhythm, normal heart sounds and intact distal pulses.    No murmur heard.  Pulmonary/Chest:  Effort normal and breath sounds normal. No respiratory distress.   Abdominal: Soft. Bowel sounds are normal. She exhibits no distension. There is no tenderness. There is no rebound and no guarding.   Genitourinary:   Genitourinary Comments: Deferred   Musculoskeletal: Normal range of motion.   Neurological: She is alert and oriented to person, place, and time.   Skin: Skin is warm and dry. Capillary refill takes less than 2 seconds.   Psychiatric: She has a normal mood and affect. Her behavior is normal. Judgment and thought content normal.   Nursing note and vitals reviewed.      Significant Labs:   CBC:   Recent Labs  Lab 11/07/17 0519 11/08/17 0439   WBC 6.52 9.26   HGB 11.9* 12.7   HCT 36.1* 37.7    216     CMP:   Recent Labs  Lab 11/07/17 0519 11/08/17 0439   * 136   K 3.8 3.6    106   CO2 22* 20*    155*   BUN 4* 6   CREATININE 0.9 0.9   CALCIUM 8.6* 9.3   PROT 6.5 6.9   ALBUMIN 2.7* 2.7*   BILITOT 0.6 0.4   ALKPHOS 114 125   AST 51* 35   ALT 66* 57*   ANIONGAP 7* 10   EGFRNONAA >60 >60       Significant Imaging: I have reviewed all pertinent imaging results/findings within the past 24 hours.

## 2017-11-08 NOTE — ASSESSMENT & PLAN NOTE
Abdominal pain persists .  Will do CT scan with contrast of abdomen to rule out any abscess .  Continue cipro/flagyl.  Will follow ID of GNR

## 2017-11-08 NOTE — CONSULTS
[facility]  Infectious Disease  Consult Note    Patient Name: Laura Vora  MRN: [id number]  Admission Date: 11/5/2017  Hospital Length of Stay: 3 days  Attending Physician: Reynaldo Heck MD  Primary Care Provider: JOVON Aviles     Isolation Status: No active isolations    Patient information was obtained from patient, past medical records and ER records.      Consults  Assessment/Plan:     * Severe sepsis    Will continue cipro and flagyl and wbc is trending down        Diverticulitis of large intestine without perforation or abscess    Abdominal pain persists .  Will do CT scan with contrast of abdomen to rule out any abscess .  Continue cipro/flagyl.  Will follow ID of GNR            Thank you for your consult. I will follow-up with patient. Please contact us if you have any additional questions.    Hardik Dawson MD  Infectious Disease  [facility]    Subjective:     Principal Problem: Severe sepsis    HPI:  47 year old female with history of HTN, tobacco abuse, morbid obesity, appendectomy and cholecystectomy. She presented to [facility] Emergency Room with complaints of severe lower abdominal pain that started a day prior to admission. Associated symptoms include fever, chills, nausea and vomiting.. CT of abdomen without contrast  showed sigmoid diverticulosis with mild wall thickening and pericolonic inflammatory change consistent with acute uncomplicated diverticulitis. WBC 22.93, Lactic acid 3.4.    Blood cultures-GNR.  Abdominal pain persists.    Past Medical History:   Diagnosis Date    Bronchitis     Diverticulitis     HTN (hypertension)     Obese     Tobacco use        Past Surgical History:   Procedure Laterality Date    APPENDECTOMY      CHOLECYSTECTOMY      HYSTERECTOMY         Review of patient's allergies indicates:   Allergen Reactions    Iodine and iodide containing products Hives    Bactrim [sulfamethoxazole-trimethoprim] Hives        Medications:  Prescriptions Prior to Admission   Medication Sig    albuterol 90 mcg/actuation inhaler Inhale 2 puffs into the lungs every 4 (four) hours as needed for Wheezing.    hydrochlorothiazide (HYDRODIURIL) 25 MG tablet Take 25 mg by mouth once daily.    lisinopril (PRINIVIL,ZESTRIL) 40 MG tablet Take 40 mg by mouth once daily.    guaifenesin-codeine 100-10 mg/5 ml (TUSSI-ORGANIDIN NR)  mg/5 mL syrup Take 5 mLs by mouth every 6 (six) hours as needed for Cough.    ibuprofen (ADVIL,MOTRIN) 800 MG tablet Take 1 tablet (800 mg total) by mouth every 6 (six) hours as needed for Pain.    ondansetron (ZOFRAN) 4 MG tablet Take 1 tablet (4 mg total) by mouth every 8 (eight) hours as needed.     Antibiotics     Start     Stop Route Frequency Ordered    11/06/17 0015  ciprofloxacin (CIPRO)400mg/200ml D5W IVPB 400 mg      -- IV Every 12 hours (non-standard times) 11/05/17 1252    11/05/17 1300  metronidazole IVPB 500 mg      -- IV Every 8 hours (non-standard times) 11/05/17 1152        Antifungals     None        Antivirals     None           Immunization History   Administered Date(s) Administered    Tdap 10/01/2017       Family History     Problem Relation (Age of Onset)    Cancer Sister, Paternal Aunt    Diabetes Mother    Heart disease Mother    Hypertension Mother        Social History     Social History    Marital status: Single     Spouse name: N/A    Number of children: N/A    Years of education: N/A     Social History Main Topics    Smoking status: Current Every Day Smoker     Packs/day: 0.50     Last attempt to quit: 1/16/2015    Smokeless tobacco: Never Used    Alcohol use Yes      Comment: occasionally    Drug use: No    Sexual activity: Not Asked     Other Topics Concern    None     Social History Narrative    None     Review of Systems   Constitutional: Positive for activity change and appetite change.   HENT: Negative.    Eyes: Negative.    Respiratory: Negative for apnea,  cough, choking, chest tightness, shortness of breath, wheezing and stridor.    Cardiovascular: Negative for chest pain, palpitations and leg swelling.   Gastrointestinal: Positive for abdominal pain and nausea. Negative for blood in stool, constipation, diarrhea, rectal pain and vomiting.   Endocrine: Negative.    Genitourinary: Negative.    Musculoskeletal: Negative.    Skin: Negative.    Allergic/Immunologic: Negative.    Neurological: Negative for dizziness, tremors, seizures, syncope, facial asymmetry, speech difficulty, weakness, light-headedness, numbness and headaches.   Hematological: Negative.    Psychiatric/Behavioral: Negative.      Objective:     Vital Signs (Most Recent):  Temp: 97.9 °F (36.6 °C) (11/08/17 0434)  Pulse: 74 (11/08/17 0434)  Resp: 18 (11/08/17 0434)  BP: (!) 109/59 (11/08/17 0434)  SpO2: 95 % (11/08/17 0434) Vital Signs (24h Range):  Temp:  [96.9 °F (36.1 °C)-98.6 °F (37 °C)] 97.9 °F (36.6 °C)  Pulse:  [74-89] 74  Resp:  [12-20] 18  SpO2:  [95 %-97 %] 95 %  BP: (109-132)/(59-77) 109/59     Weight: (!) 143.6 kg (316 lb 9.3 oz)  Body mass index is 51.1 kg/m².    Estimated Creatinine Clearance: 115.9 mL/min (based on SCr of 0.9 mg/dL).    Physical Exam   Constitutional: She is oriented to person, place, and time. She appears well-developed and well-nourished. No distress.   HENT:   Head: Normocephalic and atraumatic.   Eyes: Conjunctivae are normal.   Neck: Neck supple.   Cardiovascular: Normal rate, regular rhythm, normal heart sounds and intact distal pulses.    No murmur heard.  Pulmonary/Chest: Effort normal and breath sounds normal. No respiratory distress.   Abdominal: Soft. Bowel sounds are normal. She exhibits no distension. There is generalized tenderness. There is no rebound and no guarding.   Genitourinary:   Genitourinary Comments: Deferred   Musculoskeletal: Normal range of motion.   Neurological: She is alert and oriented to person, place, and time.   Skin: Skin is warm and  dry. Capillary refill takes less than 2 seconds.   Psychiatric: She has a normal mood and affect. Her behavior is normal. Judgment and thought content normal.   Nursing note and vitals reviewed.      Significant Labs:   Blood Culture:   Recent Labs  Lab 11/05/17  1107 11/05/17  1124   LABBLOO Gram stain colton bottle: Gram negative rods   Results called to and read back by:Rylie Quintero RN 11/06/2017  05:21  Gram stain aer bottle: Gram negative rods 11/07/2017  18:35  GRAM NEGATIVE LESTER, LACTOSE FERMENTERIdentification and susceptibility pending Gram stain colton bottle: Gram negative rods   Results called to and read back by:Rylie Quintero RN 11/06/2017  05:21  Gram stain aer bottle: Gram negative rods   Positive results previously called 11/06/2017  12:50  ESCHERICHIA COLIFor susceptibility see order # [id number]       Significant Imaging: I have reviewed all pertinent imaging results/findings within the past 24 hours.

## 2017-11-08 NOTE — PLAN OF CARE
Problem: Patient Care Overview  Goal: Plan of Care Review  Outcome: Ongoing (interventions implemented as appropriate)  Pt complains of abdominal pain. Pain management is effective. IV fluids infusing. IV ABX given per order. No fever. No injuries. Will continue to monitor. 12 hour chart check completed.

## 2017-11-08 NOTE — ASSESSMENT & PLAN NOTE
- Resolved  - Lactic acid has normalized  - Leukocytosis resolved  - Afebrile  - Blood cultures -- E. Coli - sensitives pending  - ID following  - Continue IV Flagyl and Ciprofloxacin

## 2017-11-09 VITALS
DIASTOLIC BLOOD PRESSURE: 85 MMHG | HEIGHT: 66 IN | RESPIRATION RATE: 18 BRPM | TEMPERATURE: 97 F | BODY MASS INDEX: 47.09 KG/M2 | WEIGHT: 293 LBS | OXYGEN SATURATION: 97 % | SYSTOLIC BLOOD PRESSURE: 141 MMHG | HEART RATE: 73 BPM

## 2017-11-09 LAB
ALBUMIN SERPL BCP-MCNC: 2.4 G/DL
ALP SERPL-CCNC: 104 U/L
ALT SERPL W/O P-5'-P-CCNC: 140 U/L
ANION GAP SERPL CALC-SCNC: 7 MMOL/L
AST SERPL-CCNC: 181 U/L
BACTERIA BLD CULT: NORMAL
BASOPHILS # BLD AUTO: 0.02 K/UL
BASOPHILS NFR BLD: 0.2 %
BILIRUB SERPL-MCNC: 0.2 MG/DL
BUN SERPL-MCNC: 8 MG/DL
CALCIUM SERPL-MCNC: 8.3 MG/DL
CHLORIDE SERPL-SCNC: 109 MMOL/L
CO2 SERPL-SCNC: 24 MMOL/L
CREAT SERPL-MCNC: 0.9 MG/DL
DIFFERENTIAL METHOD: ABNORMAL
EOSINOPHIL # BLD AUTO: 0.1 K/UL
EOSINOPHIL NFR BLD: 0.8 %
ERYTHROCYTE [DISTWIDTH] IN BLOOD BY AUTOMATED COUNT: 13.9 %
EST. GFR  (AFRICAN AMERICAN): >60 ML/MIN/1.73 M^2
EST. GFR  (NON AFRICAN AMERICAN): >60 ML/MIN/1.73 M^2
GLUCOSE SERPL-MCNC: 120 MG/DL
HCT VFR BLD AUTO: 35.5 %
HGB BLD-MCNC: 11.8 G/DL
LYMPHOCYTES # BLD AUTO: 2.7 K/UL
LYMPHOCYTES NFR BLD: 25.5 %
MCH RBC QN AUTO: 28.7 PG
MCHC RBC AUTO-ENTMCNC: 33.2 G/DL
MCV RBC AUTO: 86 FL
MONOCYTES # BLD AUTO: 1.2 K/UL
MONOCYTES NFR BLD: 11.4 %
NEUTROPHILS # BLD AUTO: 6.6 K/UL
NEUTROPHILS NFR BLD: 62.1 %
PLATELET # BLD AUTO: 214 K/UL
PMV BLD AUTO: 9.5 FL
POTASSIUM SERPL-SCNC: 3.2 MMOL/L
PROT SERPL-MCNC: 5.8 G/DL
RBC # BLD AUTO: 4.11 M/UL
SODIUM SERPL-SCNC: 140 MMOL/L
WBC # BLD AUTO: 10.7 K/UL

## 2017-11-09 PROCEDURE — 36415 COLL VENOUS BLD VENIPUNCTURE: CPT

## 2017-11-09 PROCEDURE — 85025 COMPLETE CBC W/AUTO DIFF WBC: CPT

## 2017-11-09 PROCEDURE — S4991 NICOTINE PATCH NONLEGEND: HCPCS | Performed by: NURSE PRACTITIONER

## 2017-11-09 PROCEDURE — 63600175 PHARM REV CODE 636 W HCPCS: Performed by: INTERNAL MEDICINE

## 2017-11-09 PROCEDURE — 3E0234Z INTRODUCTION OF SERUM, TOXOID AND VACCINE INTO MUSCLE, PERCUTANEOUS APPROACH: ICD-10-PCS | Performed by: EMERGENCY MEDICINE

## 2017-11-09 PROCEDURE — 25000003 PHARM REV CODE 250: Performed by: INTERNAL MEDICINE

## 2017-11-09 PROCEDURE — 90686 IIV4 VACC NO PRSV 0.5 ML IM: CPT | Performed by: EMERGENCY MEDICINE

## 2017-11-09 PROCEDURE — 90471 IMMUNIZATION ADMIN: CPT | Performed by: EMERGENCY MEDICINE

## 2017-11-09 PROCEDURE — S0030 INJECTION, METRONIDAZOLE: HCPCS | Performed by: INTERNAL MEDICINE

## 2017-11-09 PROCEDURE — 63600175 PHARM REV CODE 636 W HCPCS: Performed by: FAMILY MEDICINE

## 2017-11-09 PROCEDURE — 80053 COMPREHEN METABOLIC PANEL: CPT

## 2017-11-09 PROCEDURE — 25000003 PHARM REV CODE 250: Performed by: NURSE PRACTITIONER

## 2017-11-09 PROCEDURE — 63600175 PHARM REV CODE 636 W HCPCS: Performed by: EMERGENCY MEDICINE

## 2017-11-09 RX ORDER — IBUPROFEN 200 MG
1 TABLET ORAL DAILY
Qty: 30 PATCH | Refills: 0 | Status: SHIPPED | OUTPATIENT
Start: 2017-11-10 | End: 2017-12-28 | Stop reason: SDUPTHER

## 2017-11-09 RX ORDER — METRONIDAZOLE 500 MG/1
500 TABLET ORAL EVERY 8 HOURS
Status: DISCONTINUED | OUTPATIENT
Start: 2017-11-09 | End: 2017-11-09 | Stop reason: HOSPADM

## 2017-11-09 RX ORDER — METRONIDAZOLE 500 MG/1
500 TABLET ORAL EVERY 8 HOURS
Qty: 33 TABLET | Refills: 0 | Status: SHIPPED | OUTPATIENT
Start: 2017-11-09 | End: 2017-11-20

## 2017-11-09 RX ORDER — CIPROFLOXACIN 500 MG/1
500 TABLET ORAL EVERY 12 HOURS
Qty: 22 TABLET | Refills: 0 | Status: SHIPPED | OUTPATIENT
Start: 2017-11-09 | End: 2017-11-20

## 2017-11-09 RX ORDER — POTASSIUM CHLORIDE 20 MEQ/1
40 TABLET, EXTENDED RELEASE ORAL ONCE
Status: COMPLETED | OUTPATIENT
Start: 2017-11-09 | End: 2017-11-09

## 2017-11-09 RX ADMIN — METRONIDAZOLE 500 MG: 500 TABLET, FILM COATED ORAL at 01:11

## 2017-11-09 RX ADMIN — METRONIDAZOLE 500 MG: 500 INJECTION, SOLUTION INTRAVENOUS at 05:11

## 2017-11-09 RX ADMIN — MORPHINE SULFATE 4 MG: 2 INJECTION, SOLUTION INTRAMUSCULAR; INTRAVENOUS at 05:11

## 2017-11-09 RX ADMIN — LISINOPRIL 40 MG: 20 TABLET ORAL at 09:11

## 2017-11-09 RX ADMIN — MORPHINE SULFATE 4 MG: 2 INJECTION, SOLUTION INTRAMUSCULAR; INTRAVENOUS at 09:11

## 2017-11-09 RX ADMIN — MORPHINE SULFATE 4 MG: 2 INJECTION, SOLUTION INTRAMUSCULAR; INTRAVENOUS at 02:11

## 2017-11-09 RX ADMIN — HEPARIN SODIUM 7500 UNITS: 5000 INJECTION, SOLUTION INTRAVENOUS; SUBCUTANEOUS at 01:11

## 2017-11-09 RX ADMIN — HEPARIN SODIUM 7500 UNITS: 5000 INJECTION, SOLUTION INTRAVENOUS; SUBCUTANEOUS at 05:11

## 2017-11-09 RX ADMIN — NICOTINE 1 PATCH: 21 PATCH, EXTENDED RELEASE TRANSDERMAL at 09:11

## 2017-11-09 RX ADMIN — CIPROFLOXACIN 400 MG: 2 INJECTION, SOLUTION INTRAVENOUS at 12:11

## 2017-11-09 RX ADMIN — POTASSIUM CHLORIDE 40 MEQ: 1500 TABLET, EXTENDED RELEASE ORAL at 02:11

## 2017-11-09 RX ADMIN — FAMOTIDINE 20 MG: 20 TABLET ORAL at 09:11

## 2017-11-09 RX ADMIN — INFLUENZA A VIRUS A/MICHIGAN/45/2015 X-275 (H1N1) ANTIGEN (FORMALDEHYDE INACTIVATED), INFLUENZA A VIRUS A/HONG KONG/4801/2014 X-263B (H3N2) ANTIGEN (FORMALDEHYDE INACTIVATED), INFLUENZA B VIRUS B/PHUKET/3073/2013 ANTIGEN (FORMALDEHYDE INACTIVATED), AND INFLUENZA B VIRUS B/BRISBANE/60/2008 ANTIGEN (FORMALDEHYDE INACTIVATED) 0.5 ML: 15; 15; 15; 15 INJECTION, SUSPENSION INTRAMUSCULAR at 02:11

## 2017-11-09 NOTE — PROGRESS NOTES
[facility]  Infectious Disease  Progress Note    Patient Name: Laura Vora  MRN: [id number]  Admission Date: 11/5/2017  Length of Stay: 4 days  Attending Physician: Reynaldo Heck MD  Primary Care Provider: JOVON Aviles    Isolation Status: No active isolations  Assessment/Plan:      Gram-negative bacteremia    Isolate is E coli -cipro sensitive .  Will switch to PO regime in am        Diverticulitis of large intestine without perforation or abscess    Repeat abdominal CT scan did not show any abscess-  This is her second episode of diverticulitis .  Will plan for  follow up with Gen surgery on discharge.    Will switch to PO regime in am             Anticipated Disposition:     Thank you for your consult. I will follow-up with patient. Please contact us if you have any additional questions.    Hardik Dawson MD  Infectious Disease  [facility]    Subjective:     Principal Problem:Severe sepsis    HPI:  47 year old female with history of HTN, tobacco abuse, morbid obesity, appendectomy and cholecystectomy. She presented to [facility] Emergency Room with complaints of severe lower abdominal pain that started a day prior to admission. Associated symptoms include fever, chills, nausea and vomiting.. CT of abdomen without contrast  showed sigmoid diverticulosis with mild wall thickening and pericolonic inflammatory change consistent with acute uncomplicated diverticulitis. WBC 22.93, Lactic acid 3.4.    Blood cultures-GNR.  Abdominal pain persists.  Interval History: 47 year old woman with diverticulitis and E coli bacteremia.    She had prior episode of diverticulitis last year ,-(07/28/2016)  Review of Systems   Constitutional: Positive for activity change and appetite change.   HENT: Negative.    Eyes: Negative.    Respiratory: Negative for apnea, cough, choking, chest tightness, shortness of breath, wheezing and stridor.    Cardiovascular: Negative for chest pain, palpitations  and leg swelling.   Gastrointestinal: Positive for abdominal pain and nausea. Negative for blood in stool, constipation, diarrhea, rectal pain and vomiting.   Endocrine: Negative.    Genitourinary: Negative.    Musculoskeletal: Negative.    Skin: Negative.    Allergic/Immunologic: Negative.    Neurological: Negative for dizziness, tremors, seizures, syncope, facial asymmetry, speech difficulty, weakness, light-headedness, numbness and headaches.   Hematological: Negative.    Psychiatric/Behavioral: Negative.      Objective:     Vital Signs (Most Recent):  Temp: 96.3 °F (35.7 °C) (11/09/17 0459)  Pulse: 68 (11/09/17 0459)  Resp: 12 (11/08/17 2028)  BP: 137/77 (11/09/17 0459)  SpO2: 95 % (11/09/17 0459) Vital Signs (24h Range):  Temp:  [96.2 °F (35.7 °C)-98.4 °F (36.9 °C)] 96.3 °F (35.7 °C)  Pulse:  [68-85] 68  Resp:  [12-18] 12  SpO2:  [95 %-100 %] 95 %  BP: (122-152)/(62-77) 137/77     Weight: (!) 143.6 kg (316 lb 9.3 oz)  Body mass index is 51.1 kg/m².    Estimated Creatinine Clearance: 115.9 mL/min (based on SCr of 0.9 mg/dL).    Physical Exam   Constitutional: She is oriented to person, place, and time. She appears well-developed and well-nourished. No distress.   HENT:   Head: Normocephalic and atraumatic.   Eyes: Conjunctivae are normal.   Neck: Neck supple.   Cardiovascular: Normal rate, regular rhythm, normal heart sounds and intact distal pulses.    No murmur heard.  Pulmonary/Chest: Effort normal and breath sounds normal. No respiratory distress.   Abdominal: Soft. Bowel sounds are normal. She exhibits no distension. There is no tenderness. There is no rebound and no guarding.   Genitourinary:   Genitourinary Comments: Deferred   Musculoskeletal: Normal range of motion.   Neurological: She is alert and oriented to person, place, and time.   Skin: Skin is warm and dry. Capillary refill takes less than 2 seconds.   Psychiatric: She has a normal mood and affect. Her behavior is normal. Judgment and thought  content normal.   Nursing note and vitals reviewed.      Significant Labs:   Blood Culture:   Recent Labs  Lab 11/05/17  1107 11/05/17  1124 11/08/17  1254 11/08/17  1300   LABBLOO Gram stain colton bottle: Gram negative rods   Results called to and read back by:Rylie Quintero RN 11/06/2017  05:21  Gram stain aer bottle: Gram negative rods 11/07/2017  18:35  GRAM NEGATIVE LESTER, LACTOSE FERMENTERIdentification and susceptibility pending Gram stain colton bottle: Gram negative rods   Results called to and read back by:Rylie Quintero RN 11/06/2017  05:21  Gram stain aer bottle: Gram negative rods   Positive results previously called 11/06/2017  12:50  ESCHERICHIA COLIFor susceptibility see order # [id number] No Growth to date No Growth to date     BMP:   Recent Labs  Lab 11/09/17  0521   *      K 3.2*      CO2 24   BUN 8   CREATININE 0.9   CALCIUM 8.3*     CBC:   Recent Labs  Lab 11/08/17  0439 11/09/17  0521   WBC 9.26 10.70   HGB 12.7 11.8*   HCT 37.7 35.5*    214     All pertinent labs within the past 24 hours have been reviewed.    Significant Imaging: I have reviewed all pertinent imaging results/findings within the past 24 hours.

## 2017-11-09 NOTE — PLAN OF CARE
Patient discharging today and per NP for hospitalist, patient will need outpatient follow-up with PCP; and [facility] for a colonoscopy.  Attempted to contact patient's PCP ([facility]) and was instructed to call back later as lines were busy.  Instructed patient on AVS to contact PCP at [facility] and schedule appointment with PCP within 3 days from today.  Regarding outpatient GI follow-up, completed [facility] Referral Form and faxed it, along with needed patient information, to [facility] at [phone] receiving fax confirmation that it was received at [facility].  Patient aware that rep from [facility] will be contacting her within 10-14 days to schedule a follow-up appointment and instructed to contact [facility] if she does not hear from them within this timeframe.       11/09/17 1542   Final Note   Assessment Type Final Discharge Note   Discharge Disposition Home   What phone number can be called within the next 1-3 days to see how you are doing after discharge? [phone]   Hospital Follow Up  Appt(s) scheduled? Yes   [facility] Referral Info   Post Acute Recommendation No Care

## 2017-11-09 NOTE — PROGRESS NOTES
Discharge instructions given, verbalized understanding. Paper rx and work excuse given. IV removed, catheter tip intact. Waiting on paper scrubs.

## 2017-11-09 NOTE — SUBJECTIVE & OBJECTIVE
Interval History: 47 year old woman with diverticulitis and E coli bacteremia.    She had prior episode of diverticulitis last year ,-(07/28/2016)  Review of Systems   Constitutional: Positive for activity change and appetite change.   HENT: Negative.    Eyes: Negative.    Respiratory: Negative for apnea, cough, choking, chest tightness, shortness of breath, wheezing and stridor.    Cardiovascular: Negative for chest pain, palpitations and leg swelling.   Gastrointestinal: Positive for abdominal pain and nausea. Negative for blood in stool, constipation, diarrhea, rectal pain and vomiting.   Endocrine: Negative.    Genitourinary: Negative.    Musculoskeletal: Negative.    Skin: Negative.    Allergic/Immunologic: Negative.    Neurological: Negative for dizziness, tremors, seizures, syncope, facial asymmetry, speech difficulty, weakness, light-headedness, numbness and headaches.   Hematological: Negative.    Psychiatric/Behavioral: Negative.      Objective:     Vital Signs (Most Recent):  Temp: 96.3 °F (35.7 °C) (11/09/17 0459)  Pulse: 68 (11/09/17 0459)  Resp: 12 (11/08/17 2028)  BP: 137/77 (11/09/17 0459)  SpO2: 95 % (11/09/17 0459) Vital Signs (24h Range):  Temp:  [96.2 °F (35.7 °C)-98.4 °F (36.9 °C)] 96.3 °F (35.7 °C)  Pulse:  [68-85] 68  Resp:  [12-18] 12  SpO2:  [95 %-100 %] 95 %  BP: (122-152)/(62-77) 137/77     Weight: (!) 143.6 kg (316 lb 9.3 oz)  Body mass index is 51.1 kg/m².    Estimated Creatinine Clearance: 115.9 mL/min (based on SCr of 0.9 mg/dL).    Physical Exam   Constitutional: She is oriented to person, place, and time. She appears well-developed and well-nourished. No distress.   HENT:   Head: Normocephalic and atraumatic.   Eyes: Conjunctivae are normal.   Neck: Neck supple.   Cardiovascular: Normal rate, regular rhythm, normal heart sounds and intact distal pulses.    No murmur heard.  Pulmonary/Chest: Effort normal and breath sounds normal. No respiratory distress.   Abdominal: Soft. Bowel  sounds are normal. She exhibits no distension. There is no tenderness. There is no rebound and no guarding.   Genitourinary:   Genitourinary Comments: Deferred   Musculoskeletal: Normal range of motion.   Neurological: She is alert and oriented to person, place, and time.   Skin: Skin is warm and dry. Capillary refill takes less than 2 seconds.   Psychiatric: She has a normal mood and affect. Her behavior is normal. Judgment and thought content normal.   Nursing note and vitals reviewed.      Significant Labs:   Blood Culture:   Recent Labs  Lab 11/05/17  1107 11/05/17  1124 11/08/17  1254 11/08/17  1300   LABBLOO Gram stain colton bottle: Gram negative rods   Results called to and read back by:Rylie Quintero RN 11/06/2017  05:21  Gram stain aer bottle: Gram negative rods 11/07/2017  18:35  GRAM NEGATIVE LESTER, LACTOSE FERMENTERIdentification and susceptibility pending Gram stain colton bottle: Gram negative rods   Results called to and read back by:Rylie Quintero RN 11/06/2017  05:21  Gram stain aer bottle: Gram negative rods   Positive results previously called 11/06/2017  12:50  ESCHERICHIA COLIFor susceptibility see order # [id number] No Growth to date No Growth to date     BMP:   Recent Labs  Lab 11/09/17  0521   *      K 3.2*      CO2 24   BUN 8   CREATININE 0.9   CALCIUM 8.3*     CBC:   Recent Labs  Lab 11/08/17  0439 11/09/17  0521   WBC 9.26 10.70   HGB 12.7 11.8*   HCT 37.7 35.5*    214     All pertinent labs within the past 24 hours have been reviewed.    Significant Imaging: I have reviewed all pertinent imaging results/findings within the past 24 hours.

## 2017-11-09 NOTE — ASSESSMENT & PLAN NOTE
Repeat abdominal CT scan did not show any abscess-  This is her second episode of diverticulitis .  Will plan for  follow up with Gen surgery on discharge.    Will switch to PO regime in am

## 2017-11-09 NOTE — DISCHARGE SUMMARY
[facility] Hospital Medicine  Discharge Summary      Patient Name: Laura Vora  MRN: [id number]  Admission Date: 11/5/2017  Hospital Length of Stay: 4 days  Discharge Date and Time:  11/09/2017 2:24 PM  Attending Physician: Reynaldo Heck MD   Discharging Provider: FARAZ Whyte  Primary Care Provider: JOVON Aviles      HPI:   Mr Vora is a 47 year old female with PMHx of HTN, tobacco abuse, morbid obesity, appendectomy and cholecystectomy. She presented to [facility] Emergency Room with complaints of severe lower abdominal pain that started last night. Associated symptoms include fever, chills, nausea and vomiting. Denies associated symptoms of chest pain, shortness of breath, palpitations, dizziness, constipation, bloody stools or syncope. CT of abdomen today showed sigmoid diverticulosis with mild wall thickening and pericolonic inflammatory change consistent with acute uncomplicated diverticulitis. WBC 22.93, Lactic acid 3.4.       * No surgery found *      Hospital Course:   Laura Vora is a 47 year old female admitted for Severe sepsis. Upon admission -- lactic acid 3.4, WBC 22.93, and tachycardiac 121 BPM. CT abdomen/pelvis without constrast revealed sigmoid diverticulosis with mild wall thickening and pericolonic inflammatory change consistent with acute uncomplicated diverticulitis. Blood cultures collected -- E. Coli, sensitive to Ciprofloxacin. Pt received IV Flagyl and Cipro for diverticulitis. ID consulted for gram negative bacteremia and recommended CT abdomen/pelvis with contrast to r/o abscess which did not show an abscess. Repeat blood cultures with NGTD. Lactic acid has normalized and leukocytosis has resolved. Pt tolerated advanced diet. Early AM hours on 11/8/17, pt c/o chest pain which was relieved by SL Nitro. EKG NSR. Troponin negative. Pt currently denies chest pain at this time. Pt denies family hx of colon cancer. First degree relative (sister) had  polyps. Pt has never had colonoscopy. Referral sent to [facility] for colonoscopy. Pt will follow up with PCP within 3-5 days after discharge for hospital follow up. This patient was seen and examined on the date of discharge and determined suitable for discharge.      Consults:   Consults         Status Ordering Provider     Inpatient consult to Infectious Diseases  Once     Provider:  Hardik Dawson MD    Acknowledged SERENE ZEPEDA          No new Assessment & Plan notes have been filed under this hospital service since the last note was generated.  Service: Hospital Medicine    Final Active Diagnoses:    Diagnosis Date Noted POA    PRINCIPAL PROBLEM:  Severe sepsis [A41.9, R65.20] 11/05/2017 Yes    Diverticulitis of large intestine without perforation or abscess [K57.32] 11/05/2017 Yes    Gram-negative bacteremia [R78.81] 11/06/2017 Unknown    Essential hypertension [I10] 11/05/2017 Yes    Tobacco abuse [Z72.0] 11/05/2017 Yes    Bronchitis, chronic [J42] 11/05/2017 Unknown      Problems Resolved During this Admission:    Diagnosis Date Noted Date Resolved POA    Hypokalemia [E87.6] 11/06/2017 11/08/2017 No       Discharged Condition: stable    Disposition: Home or Self Care    Follow Up:  Follow-up Information     Cy Zepeda, JOVON. Schedule an appointment as soon as possible for a visit in 3 days.    Specialty:  Family Medicine  Why:  hospital follow up  Contact information:  [address] [facility]  [place] [address] [phone]             [facility].    Specialty:  Urgent Care  Why:  Referral sent to [facility] for colonoscopy. [facility] to call and scheduled appt  Contact information:  [address] [place] [address] [phone]                 Patient Instructions:     Diet general     Activity as tolerated     Call MD for:  increased confusion or weakness     Call MD for:  persistent dizziness, light-headedness, or  visual disturbances     Call MD for:  difficulty breathing or increased cough         Significant Diagnostic Studies: Labs:   CMP   Recent Labs  Lab 11/08/17 0439 11/09/17  0521    140   K 3.6 3.2*    109   CO2 20* 24   * 120*   BUN 6 8   CREATININE 0.9 0.9   CALCIUM 9.3 8.3*   PROT 6.9 5.8*   ALBUMIN 2.7* 2.4*   BILITOT 0.4 0.2   ALKPHOS 125 104   AST 35 181*   ALT 57* 140*   ANIONGAP 10 7*   ESTGFRAFRICA >60 >60   EGFRNONAA >60 >60    and CBC   Recent Labs  Lab 11/08/17 0439 11/09/17  0521   WBC 9.26 10.70   HGB 12.7 11.8*   HCT 37.7 35.5*    214     Microbiology:   Blood Culture   Lab Results   Component Value Date    LABBLOO No Growth to date 11/08/2017     Radiology:   Imaging Results          CT Abdomen Pelvis With Contrast (Final result)     Abnormal  Result time 11/08/17 08:11:17    Final result by Chuck Chong MD (11/08/17 08:11:17)                 Impression:      Sigmoid diverticulosis with mild thickening of the distal sigmoid with inflammatory changes consistent with mild acute diverticulitis.    All CT scans at this facility use dose modulation, iterative reconstruction and/or weight based dosing when appropriate to reduce radiation dose to as low as reasonably achievable.      Electronically signed by: CHUCK CHONG MD  Date:     11/08/17  Time:    08:11              Narrative:    Clinical data: Abdominal pain.    Axial images through the abdomen and pelvis were obtained  after administration of 75 cc of omni-350 contrast. Coronal and sagittal reformatted images were also submitted for interpretation.    Findings:    The visualized portion of the heart is normal.  No pericardial effusion. The lung bases demonstrate no consolidation. Mild dependent changes.  No pleural effusion.    The liver,  biliary system, pancreas, stomach, spleen, adrenal glands are normal.The aorta demonstrates no significant atherosclerotic plaque.No lymphadenopathy.    The kidneys demonstrate  normal size, position, contrast excretion with no focal abnormalities or hydronephrosis.The bladder is partially distended. .    The small bowel demonstrates no evidence of obstruction or ileus.     Sigmoid diverticulosis with mild thickening of the distal sigmoid with inflammatory changes consistent with mild acute diverticulitis.. Appendix is not seen. No inflammation.    Small amount of subcutaneous air is seen within the ventral abdomen which likely reflects an injection site. Small fat containing umbilical hernia.    Bones appear intact with mild DJD.                             CT Abdomen Pelvis  Without Contrast (Final result)  Result time 11/05/17 11:40:43   Procedure changed from CT Abdomen Pelvis With Contrast     Final result by Chuck Chong MD (11/05/17 11:40:43)                 Impression:       Sigmoid diverticulosis with mild wall thickening and pericolonic inflammatory change consistent with acute uncomplicated diverticulitis.     All CT scans at this facility use dose modulation, iterative reconstruction and/or weight based dosing when appropriate to reduce radiation dose to as low as reasonably achievable.      Electronically signed by: CHUCK CHONG MD  Date:     11/05/17  Time:    11:40              Narrative:    Indication: Abdominal pain.    Routine noncontrast CT images of the abdomen and pelvis were obtained.    Findings: Comparison made to exam 7/28/16    The lung bases are well aerated.  Heart size is normal.  No pericardial or pleural effusion.      The liver is normal in size and attenuation on this noncontrast exam. Gallbladder is surgically absent.  The stomach, spleen, pancreas, adrenal glands are normal.  Minimal stable adrenal thickening. Aorta demonstrates moderate atherosclerotic disease.      The kidneys are normal in size shape and position without radiopaque calculus or signs of hydronephrosis. The bladder is incompletely distended. The uterus is not seen. Bilateral ovaries  are noted. Cyst or follicle is suspected within the left ovary.    The visualized loops of small bowel are unremarkable.The appendix is visualized in the right lower quadrant.  There is no inflammation.    Sigmoid diverticulosis with mild wall thickening and pericolonic inflammatory change consistent with acute uncomplicated diverticulitis. Shotty nodes are seen on the iliac chain which are likely reactive.     Bones appear intact with moderate disc degenerative changes at L4/5 and L5/S1 with posterior disc bulges and mild bilateral neuroforaminal narrowing.                             RADIOLOGY REPORT (Final result)  Result time 11/09/17 13:41:57                Pending Diagnostic Studies:     None         Medications:  Reconciled Home Medications:   Current Discharge Medication List      START taking these medications    Details   ciprofloxacin HCl (CIPRO) 500 MG tablet Take 1 tablet (500 mg total) by mouth every 12 (twelve) hours.  Qty: 22 tablet, Refills: 0      metroNIDAZOLE (FLAGYL) 500 MG tablet Take 1 tablet (500 mg total) by mouth every 8 (eight) hours.  Qty: 33 tablet, Refills: 0      nicotine (NICODERM CQ) 21 mg/24 hr Place 1 patch onto the skin once daily.  Qty: 30 patch, Refills: 0         CONTINUE these medications which have NOT CHANGED    Details   albuterol 90 mcg/actuation inhaler Inhale 2 puffs into the lungs every 4 (four) hours as needed for Wheezing.  Qty: 1 Inhaler, Refills: 0      hydrochlorothiazide (HYDRODIURIL) 25 MG tablet Take 25 mg by mouth once daily.      lisinopril (PRINIVIL,ZESTRIL) 40 MG tablet Take 40 mg by mouth once daily.      guaifenesin-codeine 100-10 mg/5 ml (TUSSI-ORGANIDIN NR)  mg/5 mL syrup Take 5 mLs by mouth every 6 (six) hours as needed for Cough.  Qty: 180 mL, Refills: 0      ibuprofen (ADVIL,MOTRIN) 800 MG tablet Take 1 tablet (800 mg total) by mouth every 6 (six) hours as needed for Pain.  Qty: 20 tablet, Refills: 0      ondansetron (ZOFRAN) 4 MG tablet Take 1  tablet (4 mg total) by mouth every 8 (eight) hours as needed.  Qty: 12 tablet, Refills: 0             Indwelling Lines/Drains at time of discharge:   Lines/Drains/Airways          No matching active lines, drains, or airways          Time spent on the discharge of patient: 35 minutes  Patient was seen and examined on the date of discharge and determined to be suitable for discharge.         FARAZ Whyte  Department of Hospital Medicine  [facility]

## 2017-11-09 NOTE — PLAN OF CARE
Problem: Patient Care Overview  Goal: Plan of Care Review  Outcome: Outcome(s) achieved Date Met: 11/09/17  To be discharged. Tolerating diet. Ambulating. Voiding. Pain controlled with medication.

## 2017-11-09 NOTE — PLAN OF CARE
Problem: Patient Care Overview  Goal: Plan of Care Review  Outcome: Ongoing (interventions implemented as appropriate)  Remained free from injury. Tolerating cardiac diet. Pain controlled with medication. Voiding without difficulty. Passing gas. 12 hour chart check complete.

## 2017-11-10 ENCOUNTER — PATIENT OUTREACH (OUTPATIENT)
Dept: ADMINISTRATIVE | Facility: CLINIC | Age: 47
End: 2017-11-10

## 2017-11-10 NOTE — PATIENT INSTRUCTIONS
Understanding Sepsis  Sepsis is a severe response the body has to an infection. It is most often caused by bacteria. It is also known as septicemia, or systemic inflammatory response syndrome (SIRS). Sepsis is a medical emergency. It needs to be treated right away.  What is sepsis?  Sepsis is when the body reacts to an infection with a severe inflammatory response. It can be caused by bacteria, fungus, or a virus. Sepsis can cause many kinds of problems around the body. It can lead severe low blood pressure (shock) and organ failure. This can lead to death if not treated.  Sepsis is most common in:  · Infants and older adults  · People with an infection such as pneumonia, meningitis, or a urinary tract infection  · People who have an illness such as cancer, AIDS, or diabetes  · People being treated with chemotherapy medications or radiation  · People who have had a transplant  Symptoms of sepsis  Symptoms of sepsis can include:  · Chills and shaking  · Rapid heartbeat  · Rapid breathing  · Shortness of breath  · Severe nausea or uncontrolled vomiting  · Confusion  · Dizziness  · Decreased urination  · Severe pain, including in the back or joints   Diagnosing sepsis  If your health care provider thinks you may have sepsis, you will be given tests. You may have blood and urine tests. These are done to look for bacteria, viruses, or fungus. You may also have X-rays or other imaging tests. These may be done to look at your organs to locate the source of infection.  Treating sepsis  If you have sepsis, your health care provider will give you antibiotics through a thin, flexible tube put into a vein in your arm (IV). You will also be given fluids through the IV. You may also be given nutrition or other medications through your IV. Your health care provider will talk with you about other treatments you may need. These may include using an oxygen mask or a ventilator to help with breathing. Treatment may last at least 7  to 10 days. Sepsis must be treated in the hospital.  Date Last Reviewed: 7/15/2015  © 6548-7464 [employer]. [address] [place] [address], [place] [address]. All rights reserved. This information is not intended as a substitute for professional medical care. Always follow your healthcare professional's instructions.

## 2017-11-13 ENCOUNTER — CLINICAL SUPPORT (OUTPATIENT)
Dept: SMOKING CESSATION | Facility: CLINIC | Age: 47
End: 2017-11-13
Payer: COMMERCIAL

## 2017-11-13 DIAGNOSIS — F17.200 NICOTINE DEPENDENCE: Primary | ICD-10-CM

## 2017-11-13 LAB
BACTERIA BLD CULT: NORMAL
BACTERIA BLD CULT: NORMAL

## 2017-11-13 PROCEDURE — 99999 PR PBB SHADOW E&M-EST. PATIENT-LVL II: CPT | Mod: PBBFAC,,,

## 2017-11-13 PROCEDURE — 99404 PREV MED CNSL INDIV APPRX 60: CPT | Mod: S$GLB,,,

## 2017-11-13 RX ORDER — VARENICLINE TARTRATE 0.5 (11)-1
KIT ORAL
Qty: 1 PACKAGE | Refills: 0 | Status: SHIPPED | OUTPATIENT
Start: 2017-11-13 | End: 2017-12-14 | Stop reason: SDUPTHER

## 2017-11-30 ENCOUNTER — CLINICAL SUPPORT (OUTPATIENT)
Dept: SMOKING CESSATION | Facility: CLINIC | Age: 47
End: 2017-11-30
Payer: COMMERCIAL

## 2017-11-30 DIAGNOSIS — F17.200 NICOTINE DEPENDENCE: Primary | ICD-10-CM

## 2017-11-30 PROCEDURE — 99406 BEHAV CHNG SMOKING 3-10 MIN: CPT | Mod: S$GLB,,,

## 2017-12-02 ENCOUNTER — HOSPITAL ENCOUNTER (EMERGENCY)
Facility: HOSPITAL | Age: 47
Discharge: HOME | End: 2017-12-03
Payer: MEDICAID

## 2017-12-02 VITALS
HEIGHT: 65 IN | OXYGEN SATURATION: 98 % | WEIGHT: 293 LBS | SYSTOLIC BLOOD PRESSURE: 162 MMHG | DIASTOLIC BLOOD PRESSURE: 99 MMHG | RESPIRATION RATE: 20 BRPM | BODY MASS INDEX: 48.82 KG/M2 | HEART RATE: 99 BPM | TEMPERATURE: 99 F

## 2017-12-02 DIAGNOSIS — J06.9 VIRAL URI WITH COUGH: Primary | ICD-10-CM

## 2017-12-02 PROCEDURE — 25000003 PHARM REV CODE 250: Performed by: NURSE PRACTITIONER

## 2017-12-02 PROCEDURE — 87400 INFLUENZA A/B EACH AG IA: CPT

## 2017-12-02 PROCEDURE — 99283 EMERGENCY DEPT VISIT LOW MDM: CPT

## 2017-12-02 RX ORDER — ACETAMINOPHEN 500 MG
1000 TABLET ORAL
Status: COMPLETED | OUTPATIENT
Start: 2017-12-02 | End: 2017-12-02

## 2017-12-02 RX ADMIN — ACETAMINOPHEN 1000 MG: 500 TABLET ORAL at 11:12

## 2017-12-03 LAB
FLUAV AG SPEC QL IA: NEGATIVE
FLUBV AG SPEC QL IA: NEGATIVE
SPECIMEN SOURCE: NORMAL

## 2017-12-03 RX ORDER — CODEINE PHOSPHATE AND GUAIFENESIN 10; 100 MG/5ML; MG/5ML
5 SOLUTION ORAL EVERY 6 HOURS PRN
Qty: 180 ML | Refills: 0 | Status: SHIPPED | OUTPATIENT
Start: 2017-12-03 | End: 2020-07-31

## 2017-12-03 NOTE — DISCHARGE INSTRUCTIONS
Rest  Drink plenty of clear fluids  Nasal saline spray to clear nasal drainage and help with nasal congestion  Zyrtec or Claritin to help dry mucus and post nasal drip  Mucinex or Mucinex DM for cough and chest congestion  Tylenol or Ibuprofen for fever, headache and body aches  Warm salt water gargles for throat comfort  Chloraseptic spray or lozenges for throat comfort  Discussed viral etiology and symptoms generally resolve in 7 to 10 days  If no improvement and symptoms worsen may have progressed to bacterial component that warrants an antibiotic  RTC with no improvement or worsening

## 2017-12-03 NOTE — ED PROVIDER NOTES
Encounter Date: 12/2/2017       History     Chief Complaint   Patient presents with    URI     body aches, sore throat, L ear pain; currently being greated for bronchitis     Pt  States she is using a Ventolin inhaler for bronchitis, last used 2 hours ago. Onset of symptoms 2 days ago including generalized body aches, fatigue, subjective fever,productive cough with clear sputum, left otalgia and sore throat. Pt was at work today and did not take any meds to help with symptoms.       The history is provided by the patient.   URI   The primary symptoms include fever (subjective), fatigue, headaches, ear pain, sore throat, cough, wheezing, nausea, myalgias and arthralgias. Primary symptoms do not include swollen glands, abdominal pain, vomiting or rash. The current episode started two days ago. This is a new problem. The problem has been gradually worsening.   The fatigue began yesterday.   The headache began today. The headache developed gradually. Headache is a new problem. The headache is present intermittently. The pain from the headache is at a severity of 5/10. Location/region(s) of the headache: frontal (frontal pressure). Associated with: headache is associated with illness. The headache is not associated with eye pain.   The ear pain began today. Ear pain is a new problem. The ear pain has been gradually worsening since its onset. The left ear is affected. She has not been pulling at the affected ear. The ear pain is at a severity of 7/10.   The sore throat began yesterday. The sore throat is not accompanied by trouble swallowing or hoarse voice. The sore throat pain is at a severity of 7/10.   The cough began yesterday. The cough is productive (clear mucus). The sputum is clear.   Wheezing began yesterday. The wheezing was precipitated by smoke.   Nausea began today.   Myalgias began 2 days ago. The myalgias have been gradually worsening since their onset. The myalgias are generalized.   Symptoms associated  with the illness include chills, sinus pressure, congestion and rhinorrhea.     Review of patient's allergies indicates:   Allergen Reactions    Iodine and iodide containing products Hives    Bactrim [sulfamethoxazole-trimethoprim] Hives     Past Medical History:   Diagnosis Date    Bronchitis     Diverticulitis     HTN (hypertension)     Obese     Tobacco use      Past Surgical History:   Procedure Laterality Date    APPENDECTOMY      CHOLECYSTECTOMY      HYSTERECTOMY       Family History   Problem Relation Age of Onset    Hypertension      Diabetes Mother     Hypertension Mother     Heart disease Mother     Cancer Sister     Cancer Paternal Aunt      Social History   Substance Use Topics    Smoking status: Current Every Day Smoker     Packs/day: 0.50     Last attempt to quit: 1/16/2015    Smokeless tobacco: Never Used    Alcohol use Yes      Comment: occasionally     Review of Systems   Constitutional: Positive for chills, fatigue and fever (subjective). Negative for activity change, appetite change and diaphoresis.   HENT: Positive for congestion, ear pain, rhinorrhea, sinus pressure and sore throat. Negative for ear discharge, hoarse voice, postnasal drip, sinus pain and trouble swallowing.    Eyes: Negative for pain and redness.   Respiratory: Positive for cough and wheezing. Negative for shortness of breath.    Gastrointestinal: Positive for nausea. Negative for abdominal pain and vomiting.   Genitourinary: Negative.    Musculoskeletal: Positive for arthralgias and myalgias.   Skin: Negative for rash.   Neurological: Positive for headaches.       Physical Exam     Initial Vitals [12/02/17 2150]   BP Pulse Resp Temp SpO2   (!) 162/99 99 20 99.1 °F (37.3 °C) 98 %      MAP       120         Physical Exam    Nursing note and vitals reviewed.  Constitutional: She appears well-developed and well-nourished.   HENT:   Head: Normocephalic and atraumatic.   Right Ear: Tympanic membrane is scarred.    Left Ear: Tympanic membrane is scarred.   Nose: Mucosal edema present. Right sinus exhibits no maxillary sinus tenderness and no frontal sinus tenderness. Left sinus exhibits no maxillary sinus tenderness and no frontal sinus tenderness.   Mouth/Throat: Mucous membranes are normal. Posterior oropharyngeal erythema present. No oropharyngeal exudate or posterior oropharyngeal edema.   Dried mucus to bilateral nares    Old scarring to bilateral TMs - no erythema, bulging or signs of purulent effusion   Eyes: Conjunctivae and lids are normal.   Neck: Normal range of motion. Neck supple.   Cardiovascular: Normal rate, regular rhythm and normal heart sounds.   Pulmonary/Chest: Breath sounds normal. She has no wheezes. She has no rhonchi. She has no rales.   Abdominal: There is no guarding.   Musculoskeletal: Normal range of motion. She exhibits no edema or tenderness.   Lymphadenopathy:     She has no cervical adenopathy.   Neurological: She is alert and oriented to person, place, and time. She has normal strength.   Skin: Skin is warm and dry. No rash noted. No pallor.   Psychiatric: She has a normal mood and affect. Thought content normal.         ED Course   Procedures  Labs Reviewed   INFLUENZA A AND B ANTIGEN        Results for orders placed or performed during the hospital encounter of 12/02/17   Influenza antigen Nasal Swab   Result Value Ref Range    Influenza A Ag, EIA Negative Negative    Influenza B Ag, EIA Negative Negative    Flu A & B Source Nasal Swab           Viral URI with cough    Other orders  -     Influenza antigen Nasal Swab; Standing  -     acetaminophen tablet 1,000 mg; Take 2 tablets (1,000 mg total) by mouth ED 1 Time.  -     guaifenesin-codeine 100-10 mg/5 ml (TUSSI-ORGANIDIN NR)  mg/5 mL syrup; Take 5 mLs by mouth every 6 (six) hours as needed for Cough.  Dispense: 180 mL; Refill: 0             Imaging Results    None                ED Course        Rest  Drink plenty of clear  fluids  Nasal saline spray to clear nasal drainage and help with nasal congestion  Zyrtec or Claritin to help dry mucus and post nasal drip  Mucinex or Mucinex DM for cough and chest congestion  Tylenol or Ibuprofen for fever, headache and body aches  Warm salt water gargles for throat comfort  Chloraseptic spray or lozenges for throat comfort  Discussed viral etiology and symptoms generally resolve in 5 to 7 days  If no improvement and symptoms worsen may have progressed to bacterial component that warrants an antibiotic  RTC with no improvement or worsening  Clinical Impression:   The encounter diagnosis was Viral URI with cough.    Disposition:   Disposition: Discharged  Condition: Stable       Follow-up Information     JOVON Aviles In 2 days.    Specialty:  Family Medicine  Why:  Follow up regarding Viral Upper Respiratory Infection symptoms  Contact information:  [address] [facility]  [place] [address]  [phone]                                Yuni Cabrera NP  12/06/17 5812

## 2017-12-03 NOTE — ED NOTES
Bed: TL 03  Expected date:   Expected time:   Means of arrival:   Comments:  closed     Yuni Cabrera NP  12/02/17 4554

## 2017-12-07 ENCOUNTER — TELEPHONE (OUTPATIENT)
Dept: SMOKING CESSATION | Facility: CLINIC | Age: 47
End: 2017-12-07

## 2017-12-07 NOTE — TELEPHONE ENCOUNTER
Left message about missed  session and about  Left my name Bogdan Jade and phone number [phone].

## 2017-12-14 ENCOUNTER — TELEPHONE (OUTPATIENT)
Dept: SMOKING CESSATION | Facility: CLINIC | Age: 47
End: 2017-12-14

## 2017-12-14 DIAGNOSIS — F17.200 NICOTINE DEPENDENCE: ICD-10-CM

## 2017-12-14 RX ORDER — VARENICLINE TARTRATE 0.5 (11)-1
KIT ORAL
Qty: 1 PACKAGE | Refills: 0 | Status: SHIPPED | OUTPATIENT
Start: 2017-12-14 | End: 2017-12-28 | Stop reason: SDUPTHER

## 2017-12-28 ENCOUNTER — CLINICAL SUPPORT (OUTPATIENT)
Dept: SMOKING CESSATION | Facility: CLINIC | Age: 47
End: 2017-12-28
Payer: COMMERCIAL

## 2017-12-28 DIAGNOSIS — F17.200 NICOTINE DEPENDENCE: ICD-10-CM

## 2017-12-28 PROCEDURE — 99999 PR PBB SHADOW E&M-EST. PATIENT-LVL II: CPT | Mod: PBBFAC,,,

## 2017-12-28 PROCEDURE — 90853 GROUP PSYCHOTHERAPY: CPT | Mod: S$GLB,,, | Performed by: INTERNAL MEDICINE

## 2017-12-28 RX ORDER — IBUPROFEN 200 MG
1 TABLET ORAL DAILY
Qty: 30 PATCH | Refills: 0 | Status: SHIPPED | OUTPATIENT
Start: 2017-12-28 | End: 2017-12-28 | Stop reason: SDUPTHER

## 2017-12-28 RX ORDER — IBUPROFEN 200 MG
1 TABLET ORAL DAILY
Qty: 30 PATCH | Refills: 0 | Status: SHIPPED | OUTPATIENT
Start: 2017-12-28 | End: 2021-09-16 | Stop reason: SDUPTHER

## 2017-12-28 RX ORDER — VARENICLINE TARTRATE 0.5 (11)-1
KIT ORAL
Qty: 1 PACKAGE | Refills: 0 | Status: SHIPPED | OUTPATIENT
Start: 2017-12-28 | End: 2020-07-31

## 2017-12-28 NOTE — PROGRESS NOTES
Smoking Cessation Group Session #2    Site: [facility]    Date:  12/28/2017  Clinical Status of Patient: Outpatient   Length of Service and Code: 90 minutes - [id number]   Number in Attendance: 2  Group Activities/Focus of Group:  Sharing last weeks challenges, triggers, and coping activities to remain quit and/ or keep making progress toward cessation, completion of TCRS (Tobacco Cessation Rating Scale) learned addiction model, personal reasons for quitting, medications, goals, quit date.    Target symptoms:  withdrawal and medication side effects             The following were rated moderate (3) to severe (4) on TCRS:       Moderate 3: none     Severe 4:   none  Patient's Response to Intervention: Patient is smoking 6 cigarettes per day wit a measured CO of 26 ppm with last cigarette smoked 10 minutes prior to visit. Quit was discussed but not set at this time. Patient will be participating in weekly tobacco cessation meetings and will begin the prescribed tobacco cessation medication regimen of 21 mg patches and Chantix  1mg.       Progress Toward Goals and Other Mental Status Changes: The patient denies any abnormal behavioral or mental changes at this time.     Diagnosis: Z72.0  Plan:The patient will continue with group therapy sessions and medication monitoring by CTTS. Prescribed medication management will be by physician.   Return to Clinic: 1 week    Quit Date:    Planned Quit Date:

## 2017-12-28 NOTE — Clinical Note
Patient is smoking 6 cigarettes per day wit a measured CO of 26 ppm with last cigarette smoked 10 minutes prior to visit. Quit was discussed but not set at this time. Patient will be participating in weekly tobacco cessation meetings and will begin the prescribed tobacco cessation medication regimen of 21 mg patches and Chantix  1mg.

## 2018-01-02 ENCOUNTER — TELEPHONE (OUTPATIENT)
Dept: SMOKING CESSATION | Facility: CLINIC | Age: 48
End: 2018-01-02

## 2018-01-04 ENCOUNTER — TELEPHONE (OUTPATIENT)
Dept: SMOKING CESSATION | Facility: CLINIC | Age: 48
End: 2018-01-04

## 2018-01-19 ENCOUNTER — HOSPITAL ENCOUNTER (EMERGENCY)
Facility: HOSPITAL | Age: 48
Discharge: HOME | End: 2018-01-19

## 2018-01-19 VITALS
OXYGEN SATURATION: 99 % | SYSTOLIC BLOOD PRESSURE: 135 MMHG | RESPIRATION RATE: 18 BRPM | HEART RATE: 96 BPM | HEIGHT: 66 IN | DIASTOLIC BLOOD PRESSURE: 95 MMHG | TEMPERATURE: 98 F | WEIGHT: 293 LBS | BODY MASS INDEX: 47.09 KG/M2

## 2018-01-19 DIAGNOSIS — L08.9 SKIN INFECTION: ICD-10-CM

## 2018-01-19 DIAGNOSIS — R05.9 COUGH: ICD-10-CM

## 2018-01-19 DIAGNOSIS — J02.9 SORE THROAT: Primary | ICD-10-CM

## 2018-01-19 PROCEDURE — 99283 EMERGENCY DEPT VISIT LOW MDM: CPT

## 2018-01-19 RX ORDER — METHYLPREDNISOLONE 4 MG/1
TABLET ORAL
Qty: 1 PACKAGE | Refills: 0 | Status: SHIPPED | OUTPATIENT
Start: 2018-01-19 | End: 2018-02-09

## 2018-01-19 RX ORDER — AMOXICILLIN AND CLAVULANATE POTASSIUM 875; 125 MG/1; MG/1
1 TABLET, FILM COATED ORAL 2 TIMES DAILY
Qty: 14 TABLET | Refills: 0 | Status: SHIPPED | OUTPATIENT
Start: 2018-01-19 | End: 2020-07-31

## 2018-01-19 NOTE — ED PROVIDER NOTES
"SCRIBE #1 NOTE: I, Mariza Jeronimo, am scribing for, and in the presence of, Jose Guadalupe Richardson Jr, NP. I have scribed the entire note.      History      Chief Complaint   Patient presents with    Insect Bite     reports insect bite to R shoulder        Review of patient's allergies indicates:   Allergen Reactions    Iodine and iodide containing products Hives    Bactrim [sulfamethoxazole-trimethoprim] Hives        HPI   HPI    1/19/2018, 1:01 AM   History obtained from the patient      History of Present Illness: Laura Vora is a 47 y.o. female patient who presents to the Emergency Department for a "spider bite" to her R shoulder which she noticed 2 days ago. She reports the abscess drained yesterday. Symptoms are constant and mild in severity. Pain exacerbated with palpation. No mitigating factors reported. Patient denies fever, chills, N/V. Patient is also c/o a HA that began yesterday. No visual disturbances, photophobia, neck pain, extremity weakness/numbness. She also states that she has bronchitis and has been coughing. No further complaints or concerns at this time.     Arrival mode: Personal vehicle    PCP: JOVON Aviles       Past Medical History:  Past Medical History:   Diagnosis Date    Bronchitis     Diverticulitis     HTN (hypertension)     Obese     Tobacco use        Past Surgical History:  Past Surgical History:   Procedure Laterality Date    APPENDECTOMY      CHOLECYSTECTOMY      HYSTERECTOMY           Family History:  Family History   Problem Relation Age of Onset    Hypertension      Diabetes Mother     Hypertension Mother     Heart disease Mother     Cancer Sister     Cancer Paternal Aunt        Social History:  Social History     Social History Main Topics    Smoking status: Current Every Day Smoker     Packs/day: 0.50     Last attempt to quit: 1/16/2015    Smokeless tobacco: Never Used    Alcohol use Yes      Comment: occasionally    Drug use: No    Sexual " activity: Unknown       ROS   Review of Systems   Constitutional: Negative for chills and fever.   HENT: Negative for ear discharge and ear pain.    Eyes: Negative for photophobia, pain and visual disturbance.   Respiratory: Positive for cough. Negative for shortness of breath.    Cardiovascular: Negative for chest pain.   Gastrointestinal: Negative for abdominal pain, nausea and vomiting.   Genitourinary: Negative for dysuria.   Musculoskeletal: Negative for back pain, neck pain and neck stiffness.   Skin: Negative for rash.        (+) abscess, (+) drainage   Neurological: Positive for headaches. Negative for syncope, weakness, light-headedness and numbness.   Hematological: Does not bruise/bleed easily.   All other systems reviewed and are negative.      Physical Exam      Initial Vitals [01/19/18 0047]   BP Pulse Resp Temp SpO2   (!) 135/95 96 18 98.2 °F (36.8 °C) 99 %      MAP       108.33          Physical Exam  Nursing Notes and Vital Signs Reviewed.  Constitutional: Patient is in no acute distress. Awake and alert. Well-developed and well-nourished.  Head: Atraumatic. Normocephalic.  Eyes: Conjunctivae are not pale. No scleral icterus.  ENT: Bilateral TMs normal. Nares are clear and patent. Mucous membranes are moist. Oropharynx is is erythematous. Tonsils are 2+. No exudate. No uvula shift. No stridor.  Neck: Supple. Full ROM.   Cardiovascular: Regular rate. Regular rhythm. No murmurs, rubs, or gallops. Distal pulses are 2+ and symmetric.  Pulmonary/Chest: No respiratory distress. Clear to auscultation bilaterally. No wheezing, rales, or rhonchi.  Abdominal: Soft. Non-distended.   Musculoskeletal: Moves all extremities. No obvious deformities.   Skin: Warm and dry. 2 cm area of erythema and mild induration  to the R clavicle region.  Neurological:  Alert, awake, and appropriate.  Normal speech. No acute focal neurological deficits are appreciated.  Psychiatric: Normal affect. Good eye contact. Appropriate in  "content.    ED Course    Procedures  ED Vital Signs:  Vitals:    01/19/18 0047   BP: (!) 135/95   Pulse: 96   Resp: 18   Temp: 98.2 °F (36.8 °C)   TempSrc: Oral   SpO2: 99%   Weight: (!) 143.9 kg (317 lb 3.9 oz)   Height: 5' 6" (1.676 m)     The Emergency Provider reviewed the vital signs and test results, which are outlined above.    ED Discussion   1:06 AM: Discussed pt dx and plan of tx. Informed pt to follow up with PCP in 3 days. All questions and concerns were addressed at this time. Pt expresses understanding of information and instructions, and is comfortable with plan to discharge. Pt is stable for discharge.    I discussed wound care precautions with patient and/or family/caretaker; specifically that all wounds have risk of infection despite efforts to cleanse and debride the wound; and there is a risk of an occult foreign body (and thus increased risk of infection) despite a negative examination.  I discussed with patient need to return for any signs of infection, specifically redness, increased pain, fever, drainage of pus, or any concern, immediately.    ED Medication(s):  Medications - No data to display    New Prescriptions    AMOXICILLIN-CLAVULANATE 875-125MG (AUGMENTIN) 875-125 MG PER TABLET    Take 1 tablet by mouth 2 (two) times daily.    METHYLPREDNISOLONE (MEDROL DOSEPACK) 4 MG TABLET    Take as directed       Follow-up Information     JOVON Aviles In 3 days.    Specialty:  Family Medicine  Contact information:  [address] [facility]  [place] [address] [phone]                    Medical Decision Making              Scribe Attestation:   Scribe #1: I performed the above scribed service and the documentation accurately describes the services I performed. I attest to the accuracy of the note.    Attending:   Physician Attestation Statement for Scribe #1: I, Jose Guadalupe Richardson Jr, NP, personally performed the services described in this documentation, as scribed " by Mariza Jeronimo, in my presence, and it is both accurate and complete.          Clinical Impression       ICD-10-CM ICD-9-CM   1. Sore throat J02.9 462   2. Cough R05 786.2   3. Skin infection L08.9 686.9       Disposition:   Disposition: Discharged  Condition: Stable           Jose Guadalupe Richardson Jr., [facility]  01/19/18 0121

## 2018-02-12 ENCOUNTER — HOSPITAL ENCOUNTER (EMERGENCY)
Facility: HOSPITAL | Age: 48
Discharge: HOME | End: 2018-02-12

## 2018-02-12 VITALS
RESPIRATION RATE: 16 BRPM | OXYGEN SATURATION: 95 % | BODY MASS INDEX: 47.09 KG/M2 | TEMPERATURE: 98 F | HEIGHT: 66 IN | SYSTOLIC BLOOD PRESSURE: 173 MMHG | WEIGHT: 293 LBS | DIASTOLIC BLOOD PRESSURE: 101 MMHG | HEART RATE: 96 BPM

## 2018-02-12 DIAGNOSIS — R52 PAIN: ICD-10-CM

## 2018-02-12 DIAGNOSIS — M54.12 CERVICAL RADICULOPATHY: Primary | ICD-10-CM

## 2018-02-12 PROCEDURE — 99283 EMERGENCY DEPT VISIT LOW MDM: CPT

## 2018-02-12 RX ORDER — TRAMADOL HYDROCHLORIDE 50 MG/1
50 TABLET ORAL EVERY 6 HOURS PRN
Qty: 12 TABLET | Refills: 0 | Status: SHIPPED | OUTPATIENT
Start: 2018-02-12 | End: 2018-02-22

## 2018-02-12 RX ORDER — METHYLPREDNISOLONE 4 MG/1
TABLET ORAL
Qty: 1 PACKAGE | Refills: 0 | Status: SHIPPED | OUTPATIENT
Start: 2018-02-12 | End: 2018-03-05

## 2018-02-12 NOTE — ED PROVIDER NOTES
SCRIBE #1 NOTE: I, Zach Nascimento, am scribing for, and in the presence of, Jose Guadalupe Richardson Jr., NP. I have scribed the entire note.      History      Chief Complaint   Patient presents with    Shoulder Pain     x 3-4 days to right side       Review of patient's allergies indicates:   Allergen Reactions    Iodine and iodide containing products Hives    Bactrim [sulfamethoxazole-trimethoprim] Hives        HPI   HPI    2/12/2018, 5:50 PM   History obtained from the patient      History of Present Illness: Laura Vora is a 47 y.o. female patient who presents to the Emergency Department for R shoulder pain  which onset gradually 3-4 days ago. Symptoms are constant and moderate in severity. Sx are exacerbated with certain movements and relieved by nothinig. Pt states the pain radiates down her RLE and reports having an episode of a numb/tingling sensation to her RUE. No other sxs reported. Patient denies any fever, N/V/D, chills, weakness, recent injury/trauma, neck pain, elbow pain, ecchymosis, erythema and all other sxs at this time. No further complaints or concerns at this time.     Arrival mode: Personal vehicle     PCP: JOVON Aviles       Past Medical History:  Past Medical History:   Diagnosis Date    Bronchitis     Diverticulitis     HTN (hypertension)     Obese     Tobacco use        Past Surgical History:  Past Surgical History:   Procedure Laterality Date    APPENDECTOMY      CHOLECYSTECTOMY      HYSTERECTOMY           Family History:  Family History   Problem Relation Age of Onset    Hypertension      Diabetes Mother     Hypertension Mother     Heart disease Mother     Cancer Sister     Cancer Paternal Aunt        Social History:  Social History     Social History Main Topics    Smoking status: Current Every Day Smoker     Packs/day: 0.50     Last attempt to quit: 1/16/2015    Smokeless tobacco: Never Used    Alcohol use Yes      Comment: occasionally    Drug use: No     Sexual activity: Not on file       ROS   Review of Systems   Constitutional: Negative for chills and fever.   HENT: Negative for congestion and sore throat.    Respiratory: Negative for chest tightness and shortness of breath.    Cardiovascular: Negative for chest pain.   Gastrointestinal: Negative for abdominal pain, nausea and vomiting.   Musculoskeletal: Positive for arthralgias (R shoulder). Negative for back pain, joint swelling, neck pain and neck stiffness.   Skin: Negative for rash.   Neurological: Positive for numbness (tingling RUE  ). Negative for dizziness and headaches.   Psychiatric/Behavioral: Negative for agitation and confusion.   All other systems reviewed and are negative.      Physical Exam      Initial Vitals [02/12/18 1716]   BP Pulse Resp Temp SpO2   (!) 173/101 96 16 98.1 °F (36.7 °C) 95 %      MAP       125          Physical Exam  Nursing Notes and Vital Signs Reviewed.  Constitutional: Patient is in no apparent distress. Well-developed and well-nourished.  Head: Atraumatic. Normocephalic.  Eyes: PERRL. EOM intact. Conjunctivae are not pale. No scleral icterus.  ENT: Mucous membranes are moist. Oropharynx is clear and symmetric.    Neck: Supple. Full ROM. No lymphadenopathy. No cervical midline bony tenderness, deformities, or step-offs.   Cardiovascular: Regular rate. Regular rhythm. No murmurs, rubs, or gallops. Distal pulses are 2+ and symmetric.  Pulmonary/Chest: No respiratory distress. Clear to auscultation bilaterally. No wheezing or rales.  Abdominal: Soft and non-distended.  There is no tenderness.  No rebound, guarding, or rigidity. Good bowel sounds.  Musculoskeletal: Moves all extremities. No obvious deformities. No edema. No calf tenderness.  RUE: has no evident deformity. negative for swelling. negative for tenderness. ROM is normal. Cap refill distally is <2 seconds. Radial pulses are equal and 2+ bilaterally. No motor deficit. No distal sensory deficit.   Skin: Warm and  "dry.  Neurological: Patient is alert and oriented to person, place and time. Pupils ERRL and EOM normal. Cranial nerves II-XII are intact. Strength is full bilaterally; it is equal and 5/5 in bilateral upper and lower extremities. Light touch sense is intact. Speech is clear and normal. No acute focal neurological deficits noted.  Psychiatric: Normal affect. Good eye contact. Appropriate in content.    ED Course    Procedures  ED Vital Signs:  Vitals:    02/12/18 1716   BP: (!) 173/101   Pulse: 96   Resp: 16   Temp: 98.1 °F (36.7 °C)   TempSrc: Oral   SpO2: 95%   Weight: (!) 149 kg (328 lb 7.8 oz)   Height: 5' 6" (1.676 m)       Abnormal Lab Results:  Labs Reviewed - No data to display     All Lab Results:  None    Imaging Results:  Imaging Results    None                 The Emergency Provider reviewed the vital signs and test results, which are outlined above.    ED Discussion     5:54 PM: Pt is in NAD. Pt is awake, alert, and oriented. Discussed with pt all pertinent ED information and results. Discussed pt dx and plan of tx. Gave pt all f/u and return to the ED instructions. All questions and concerns were addressed at this time. Pt expresses understanding of information and instructions, and is comfortable with plan to discharge. Pt is stable for discharge.        ED Medication(s):  Medications - No data to display    Discharge Medication List as of 2/12/2018  5:56 PM      START taking these medications    Details   methylPREDNISolone (MEDROL DOSEPACK) 4 mg tablet Take as directed, Print      traMADol (ULTRAM) 50 mg tablet Take 1 tablet (50 mg total) by mouth every 6 (six) hours as needed., Starting Mon 2/12/2018, Until Thu 2/22/2018, Print             Follow-up Information     JOVON Aviles In 3 days.    Specialty:  Family Medicine  Contact information:  [address] [facility]  [place] [address] [phone]                     Medical Decision Making              Scribe " Attestation:   Scribe #1: I performed the above scribed service and the documentation accurately describes the services I performed. I attest to the accuracy of the note.    Attending:   Physician Attestation Statement for Scribe #1: I, Jose Guadalupe Richardson Jr., NP, personally performed the services described in this documentation, as scribed by Zach Nascimento, in my presence, and it is both accurate and complete.          Clinical Impression       ICD-10-CM ICD-9-CM   1. Cervical radiculopathy M54.12 723.4   2. Pain R52 780.96       Disposition:   Disposition: Discharged  Condition: Stable         Jose Guadalupe Richardson Jr., [facility]  02/12/18 1906

## 2018-06-05 ENCOUNTER — CLINICAL SUPPORT (OUTPATIENT)
Dept: SMOKING CESSATION | Facility: CLINIC | Age: 48
End: 2018-06-05
Payer: COMMERCIAL

## 2018-06-05 ENCOUNTER — TELEPHONE (OUTPATIENT)
Dept: SMOKING CESSATION | Facility: CLINIC | Age: 48
End: 2018-06-05

## 2018-06-05 DIAGNOSIS — F17.200 NICOTINE DEPENDENCE: Primary | ICD-10-CM

## 2018-06-05 PROCEDURE — 99407 BEHAV CHNG SMOKING > 10 MIN: CPT | Mod: S$GLB,,,

## 2018-06-05 NOTE — PROGRESS NOTES
Called pt to f/u on her 3 and 6 month smoking cessation quit status. Pt stated she is still smoking. Informed her she has benefits available and is able to rejoin. Pt not ready to make appointment. She will call back when ready.

## 2018-11-21 ENCOUNTER — CLINICAL SUPPORT (OUTPATIENT)
Dept: SMOKING CESSATION | Facility: CLINIC | Age: 48
End: 2018-11-21
Payer: COMMERCIAL

## 2018-11-21 DIAGNOSIS — F17.200 NICOTINE DEPENDENCE: Primary | ICD-10-CM

## 2018-11-21 PROCEDURE — 99407 BEHAV CHNG SMOKING > 10 MIN: CPT | Mod: S$GLB,,,

## 2018-11-23 ENCOUNTER — HOSPITAL ENCOUNTER (EMERGENCY)
Facility: HOSPITAL | Age: 48
Discharge: HOME | End: 2018-11-24
Attending: EMERGENCY MEDICINE
Payer: COMMERCIAL

## 2018-11-23 DIAGNOSIS — K57.92 DIVERTICULITIS: Primary | ICD-10-CM

## 2018-11-23 LAB
ALBUMIN SERPL BCP-MCNC: 3.4 G/DL
ALP SERPL-CCNC: 71 U/L
ALT SERPL W/O P-5'-P-CCNC: 26 U/L
ANION GAP SERPL CALC-SCNC: 8 MMOL/L
AST SERPL-CCNC: 31 U/L
B-HCG UR QL: NEGATIVE
BASOPHILS # BLD AUTO: 0.01 K/UL
BASOPHILS NFR BLD: 0.1 %
BILIRUB SERPL-MCNC: 0.4 MG/DL
BILIRUB UR QL STRIP: NEGATIVE
BUN SERPL-MCNC: 12 MG/DL
CALCIUM SERPL-MCNC: 9.2 MG/DL
CHLORIDE SERPL-SCNC: 105 MMOL/L
CLARITY UR: CLEAR
CO2 SERPL-SCNC: 24 MMOL/L
COLOR UR: YELLOW
CREAT SERPL-MCNC: 1 MG/DL
DIFFERENTIAL METHOD: ABNORMAL
EOSINOPHIL # BLD AUTO: 0.3 K/UL
EOSINOPHIL NFR BLD: 2.6 %
ERYTHROCYTE [DISTWIDTH] IN BLOOD BY AUTOMATED COUNT: 14.3 %
EST. GFR  (AFRICAN AMERICAN): >60 ML/MIN/1.73 M^2
EST. GFR  (NON AFRICAN AMERICAN): >60 ML/MIN/1.73 M^2
GLUCOSE SERPL-MCNC: 87 MG/DL
GLUCOSE UR QL STRIP: NEGATIVE
HCT VFR BLD AUTO: 38.4 %
HGB BLD-MCNC: 12.7 G/DL
HGB UR QL STRIP: NEGATIVE
KETONES UR QL STRIP: NEGATIVE
LEUKOCYTE ESTERASE UR QL STRIP: NEGATIVE
LIPASE SERPL-CCNC: 23 U/L
LYMPHOCYTES # BLD AUTO: 2.8 K/UL
LYMPHOCYTES NFR BLD: 21.2 %
MCH RBC QN AUTO: 28.9 PG
MCHC RBC AUTO-ENTMCNC: 33.1 G/DL
MCV RBC AUTO: 87 FL
MONOCYTES # BLD AUTO: 0.8 K/UL
MONOCYTES NFR BLD: 5.9 %
NEUTROPHILS # BLD AUTO: 9.4 K/UL
NEUTROPHILS NFR BLD: 70.2 %
NITRITE UR QL STRIP: NEGATIVE
PH UR STRIP: 6 [PH] (ref 5–8)
PLATELET # BLD AUTO: 282 K/UL
PMV BLD AUTO: 9.1 FL
POTASSIUM SERPL-SCNC: 4.3 MMOL/L
PROT SERPL-MCNC: 7.3 G/DL
PROT UR QL STRIP: NEGATIVE
RBC # BLD AUTO: 4.4 M/UL
SODIUM SERPL-SCNC: 137 MMOL/L
SP GR UR STRIP: 1.02 (ref 1–1.03)
URN SPEC COLLECT METH UR: NORMAL
UROBILINOGEN UR STRIP-ACNC: NEGATIVE EU/DL
WBC # BLD AUTO: 13.33 K/UL

## 2018-11-23 PROCEDURE — 96374 THER/PROPH/DIAG INJ IV PUSH: CPT

## 2018-11-23 PROCEDURE — 96361 HYDRATE IV INFUSION ADD-ON: CPT

## 2018-11-23 PROCEDURE — 25500020 PHARM REV CODE 255: Performed by: EMERGENCY MEDICINE

## 2018-11-23 PROCEDURE — 81025 URINE PREGNANCY TEST: CPT

## 2018-11-23 PROCEDURE — 63600175 PHARM REV CODE 636 W HCPCS: Performed by: EMERGENCY MEDICINE

## 2018-11-23 PROCEDURE — 85025 COMPLETE CBC W/AUTO DIFF WBC: CPT

## 2018-11-23 PROCEDURE — 96375 TX/PRO/DX INJ NEW DRUG ADDON: CPT

## 2018-11-23 PROCEDURE — 81003 URINALYSIS AUTO W/O SCOPE: CPT

## 2018-11-23 PROCEDURE — 25000003 PHARM REV CODE 250: Performed by: EMERGENCY MEDICINE

## 2018-11-23 PROCEDURE — 99285 EMERGENCY DEPT VISIT HI MDM: CPT | Mod: 25

## 2018-11-23 PROCEDURE — 83690 ASSAY OF LIPASE: CPT

## 2018-11-23 PROCEDURE — 80053 COMPREHEN METABOLIC PANEL: CPT

## 2018-11-23 RX ORDER — CIPROFLOXACIN 500 MG/1
500 TABLET ORAL
Status: COMPLETED | OUTPATIENT
Start: 2018-11-24 | End: 2018-11-24

## 2018-11-23 RX ORDER — MORPHINE SULFATE 4 MG/ML
4 INJECTION, SOLUTION INTRAMUSCULAR; INTRAVENOUS
Status: COMPLETED | OUTPATIENT
Start: 2018-11-23 | End: 2018-11-23

## 2018-11-23 RX ORDER — ONDANSETRON 2 MG/ML
4 INJECTION INTRAMUSCULAR; INTRAVENOUS
Status: COMPLETED | OUTPATIENT
Start: 2018-11-23 | End: 2018-11-23

## 2018-11-23 RX ORDER — DIPHENHYDRAMINE HYDROCHLORIDE 50 MG/ML
25 INJECTION INTRAMUSCULAR; INTRAVENOUS
Status: COMPLETED | OUTPATIENT
Start: 2018-11-23 | End: 2018-11-23

## 2018-11-23 RX ORDER — HYDROCODONE BITARTRATE AND ACETAMINOPHEN 7.5; 325 MG/1; MG/1
1 TABLET ORAL ONCE
Status: COMPLETED | OUTPATIENT
Start: 2018-11-24 | End: 2018-11-24

## 2018-11-23 RX ORDER — METRONIDAZOLE 500 MG/1
500 TABLET ORAL
Status: COMPLETED | OUTPATIENT
Start: 2018-11-24 | End: 2018-11-24

## 2018-11-23 RX ADMIN — DIPHENHYDRAMINE HYDROCHLORIDE 25 MG: 50 INJECTION, SOLUTION INTRAMUSCULAR; INTRAVENOUS at 09:11

## 2018-11-23 RX ADMIN — ONDANSETRON 4 MG: 2 INJECTION INTRAMUSCULAR; INTRAVENOUS at 09:11

## 2018-11-23 RX ADMIN — SODIUM CHLORIDE 1000 ML: 0.9 INJECTION, SOLUTION INTRAVENOUS at 09:11

## 2018-11-23 RX ADMIN — IOHEXOL 75 ML: 350 INJECTION, SOLUTION INTRAVENOUS at 10:11

## 2018-11-23 RX ADMIN — MORPHINE SULFATE 4 MG: 4 INJECTION, SOLUTION INTRAMUSCULAR; INTRAVENOUS at 09:11

## 2018-11-24 VITALS
TEMPERATURE: 98 F | HEIGHT: 66 IN | DIASTOLIC BLOOD PRESSURE: 82 MMHG | SYSTOLIC BLOOD PRESSURE: 133 MMHG | HEART RATE: 82 BPM | BODY MASS INDEX: 47.09 KG/M2 | WEIGHT: 293 LBS | OXYGEN SATURATION: 97 % | RESPIRATION RATE: 20 BRPM

## 2018-11-24 PROCEDURE — 25000003 PHARM REV CODE 250: Performed by: EMERGENCY MEDICINE

## 2018-11-24 RX ORDER — CIPROFLOXACIN 500 MG/1
500 TABLET ORAL 2 TIMES DAILY
Qty: 14 TABLET | Refills: 0 | Status: SHIPPED | OUTPATIENT
Start: 2018-11-24 | End: 2018-12-01

## 2018-11-24 RX ORDER — METRONIDAZOLE 500 MG/1
500 TABLET ORAL 3 TIMES DAILY
Qty: 21 TABLET | Refills: 0 | Status: SHIPPED | OUTPATIENT
Start: 2018-11-24 | End: 2018-12-01

## 2018-11-24 RX ADMIN — CIPROFLOXACIN HYDROCHLORIDE 500 MG: 500 TABLET, FILM COATED ORAL at 12:11

## 2018-11-24 RX ADMIN — HYDROCODONE BITARTRATE AND ACETAMINOPHEN 1 TABLET: 7.5; 325 TABLET ORAL at 12:11

## 2018-11-24 RX ADMIN — METRONIDAZOLE 500 MG: 500 TABLET, FILM COATED ORAL at 12:11

## 2018-11-24 NOTE — ED PROVIDER NOTES
SCRIBE #1 NOTE: I, Charlette Rasmussen, am scribing for, and in the presence of, Chuck Flores MD. I have scribed the entire note.       History     Chief Complaint   Patient presents with    Abdominal Pain     RLQ and LLQ pain x 2 days. +n/v/diarrhea. HX diverticulitis.      Review of patient's allergies indicates:   Allergen Reactions    Iodine and iodide containing products Hives    Bactrim [sulfamethoxazole-trimethoprim] Hives         History of Present Illness     HPI    11/23/2018, 8:23 PM  History obtained from the patient      History of Present Illness: Laura Vora is a 48 y.o. female patient with a PMHx of HTN and diverticulitis who presents to the Emergency Department for evaluation of RLQ and LLQ abd pain which onset gradually 2 days ago. Pt reports relief w/ bowel movement. Symptoms are constant and moderate in severity. No mitigating or exacerbating factors reported. Associated sxs include chills, nausea, diarrhea, urgency, and frequency. Patient denies any vomiting, dysuria, hematochezia, constipation, hematuria, fever, and all other sxs at this time. Pt has hx of Diverticulitis. No further complaints or concerns at this time.         Arrival mode: Personal vehicle     PCP: [facility]        Past Medical History:  Past Medical History:   Diagnosis Date    Bronchitis     Diverticulitis     HTN (hypertension)     Obese     Tobacco use        Past Surgical History:  Past Surgical History:   Procedure Laterality Date    APPENDECTOMY      CHOLECYSTECTOMY      HYSTERECTOMY           Family History:  Family History   Problem Relation Age of Onset    Hypertension Unknown     Diabetes Mother     Hypertension Mother     Heart disease Mother     Cancer Sister     Cancer Paternal Aunt        Social History:  Social History     Tobacco Use    Smoking status: Current Every Day Smoker     Packs/day: 0.50     Last attempt to quit: 1/16/2015     Years since quitting: 3.8    Smokeless  tobacco: Never Used   Substance and Sexual Activity    Alcohol use: Yes     Comment: occasionally    Drug use: No    Sexual activity: Unknown        Review of Systems     Review of Systems   Constitutional: Positive for chills. Negative for fever.   HENT: Negative for sore throat.    Respiratory: Negative for shortness of breath.    Cardiovascular: Negative for chest pain.   Gastrointestinal: Positive for abdominal pain (RLQ & LLQ), diarrhea and nausea. Negative for blood in stool, constipation and vomiting.   Genitourinary: Positive for frequency and urgency. Negative for dysuria and hematuria.   Musculoskeletal: Negative for back pain.   Skin: Negative for rash.   Neurological: Negative for weakness.   Hematological: Does not bruise/bleed easily.   All other systems reviewed and are negative.       Physical Exam     Initial Vitals [11/23/18 1946]   BP Pulse Resp Temp SpO2   (!) 152/84 80 20 98 °F (36.7 °C) 96 %      MAP       --          Physical Exam  Nursing Notes and Vital Signs Reviewed.  Constitutional: Patient is in no acute distress. Well-developed and well-nourished.  Head: Atraumatic. Normocephalic.  Eyes: PERRL. EOM intact. Conjunctivae are not pale. No scleral icterus.  ENT: Mucous membranes are moist. Oropharynx is clear and symmetric.    Neck: Supple. Full ROM. No lymphadenopathy.  Cardiovascular: Regular rate. Regular rhythm. No murmurs, rubs, or gallops. Distal pulses are 2+ and symmetric.  Pulmonary/Chest: No respiratory distress. Clear to auscultation bilaterally. No wheezing or rales.  Abdominal: Soft and non-distended. Suprapubic tenderness.  No rebound, guarding, or rigidity. Good bowel sounds. Negative Mcburneys  Genitourinary: No CVA tenderness  Musculoskeletal: Moves all extremities. No obvious deformities. No edema. No calf tenderness.  Skin: Warm and dry.  Neurological:  Alert, awake, and appropriate.  Normal speech.  No acute focal neurological deficits are appreciated.  Psychiatric:  "Normal affect. Good eye contact. Appropriate in content.     ED Course   Procedures  ED Vital Signs:  Vitals:    11/23/18 1946 11/23/18 2100 11/23/18 2132 11/23/18 2156   BP: (!) 152/84   134/77   Pulse: 80 83 81 83   Resp: 20  20 20   Temp: 98 °F (36.7 °C)      TempSrc: Oral      SpO2: 96%  98% 96%   Weight: (!) 146 kg (321 lb 14 oz)      Height: 5' 6" (1.676 m)       11/24/18 0012   BP: 133/82   Pulse: 82   Resp: 20   Temp: 98.2 °F (36.8 °C)   TempSrc: Oral   SpO2: 97%   Weight:    Height:        Abnormal Lab Results:  Labs Reviewed   CBC W/ AUTO DIFFERENTIAL - Abnormal; Notable for the following components:       Result Value    WBC 13.33 (*)     MPV 9.1 (*)     Gran # (ANC) 9.4 (*)     All other components within normal limits   COMPREHENSIVE METABOLIC PANEL - Abnormal; Notable for the following components:    Albumin 3.4 (*)     All other components within normal limits   URINALYSIS, REFLEX TO URINE CULTURE    Narrative:     Preferred Collection Type->Urine, Clean Catch   LIPASE   PREGNANCY TEST, URINE RAPID        All Lab Results:  Results for orders placed or performed during the hospital encounter of 11/23/18   CBC auto differential   Result Value Ref Range    WBC 13.33 (H) 3.90 - 12.70 K/uL    RBC 4.40 4.00 - 5.40 M/uL    Hemoglobin 12.7 12.0 - 16.0 g/dL    Hematocrit 38.4 37.0 - 48.5 %    MCV 87 82 - 98 fL    MCH 28.9 27.0 - 31.0 pg    MCHC 33.1 32.0 - 36.0 g/dL    RDW 14.3 11.5 - 14.5 %    Platelets 282 150 - 350 K/uL    MPV 9.1 (L) 9.2 - 12.9 fL    Gran # (ANC) 9.4 (H) 1.8 - 7.7 K/uL    Lymph # 2.8 1.0 - 4.8 K/uL    Mono # 0.8 0.3 - 1.0 K/uL    Eos # 0.3 0.0 - 0.5 K/uL    Baso # 0.01 0.00 - 0.20 K/uL    Gran% 70.2 38.0 - 73.0 %    Lymph% 21.2 18.0 - 48.0 %    Mono% 5.9 4.0 - 15.0 %    Eosinophil% 2.6 0.0 - 8.0 %    Basophil% 0.1 0.0 - 1.9 %    Differential Method Automated    Comprehensive metabolic panel   Result Value Ref Range    Sodium 137 136 - 145 mmol/L    Potassium 4.3 3.5 - 5.1 mmol/L    " Chloride 105 95 - 110 mmol/L    CO2 24 23 - 29 mmol/L    Glucose 87 70 - 110 mg/dL    BUN, Bld 12 6 - 20 mg/dL    Creatinine 1.0 0.5 - 1.4 mg/dL    Calcium 9.2 8.7 - 10.5 mg/dL    Total Protein 7.3 6.0 - 8.4 g/dL    Albumin 3.4 (L) 3.5 - 5.2 g/dL    Total Bilirubin 0.4 0.1 - 1.0 mg/dL    Alkaline Phosphatase 71 55 - 135 U/L    AST 31 10 - 40 U/L    ALT 26 10 - 44 U/L    Anion Gap 8 8 - 16 mmol/L    eGFR if African American >60 >60 mL/min/1.73 m^2    eGFR if non African American >60 >60 mL/min/1.73 m^2   Urinalysis, Reflex to Urine Culture Urine, Clean Catch   Result Value Ref Range    Specimen UA Urine, Clean Catch     Color, UA Yellow Yellow, Straw, [place]    Appearance, UA Clear Clear    pH, UA 6.0 5.0 - 8.0    Specific Gravity, UA 1.020 1.005 - 1.030    Protein, UA Negative Negative    Glucose, UA Negative Negative    Ketones, UA Negative Negative    Bilirubin (UA) Negative Negative    Occult Blood UA Negative Negative    Nitrite, UA Negative Negative    Urobilinogen, UA Negative <2.0 EU/dL    Leukocytes, UA Negative Negative   Lipase   Result Value Ref Range    Lipase 23 4 - 60 U/L   Pregnancy, urine rapid   Result Value Ref Range    Preg Test, Ur Negative          Imaging Results:  Imaging Results          CT Abdomen Pelvis With Contrast (Final result)  Result time 11/23/18 22:49:11    Final result by Kingsley Larios III, MD (11/23/18 22:49:11)                 Impression:      Mild acute sigmoid diverticulitis.  No free air or abscess identified.      Electronically signed by: Kingsley Larios MD  Date:    11/23/2018  Time:    22:49             Narrative:    EXAMINATION:  CT ABDOMEN PELVIS WITH CONTRAST    CLINICAL HISTORY:  lower abdominal pain;    TECHNIQUE:  Routine abdominal and pelvic CT performed before and after the IV administration of 75 mL Omnipaque 350. Coronal and sagittal images obtained. All CT scans at this facility are performed  using dose modulation techniques as appropriate to performed exam  including the following:  automated exposure control; adjustment of mA and/or kV according to the patients size (this includes techniques or standardized protocols for targeted exams where dose is matched to indication/reason for exam: i.e. extremities or head);  iterative reconstruction technique.    COMPARISON:  11/08/2017    FINDINGS:  No acute disease or suspicious mass is seen in the lung bases.  No acute osseous abnormality or suspicious bone marrow lesion identified.    There is mild acute diverticulitis of the mid sigmoid colon with mild wall thickening and surrounding mesenteric inflammatory stranding.  There is trace adjacent free fluid.  No free air or abscess identified.  The small bowel is within normal limits.  No evidence of bowel obstruction.  The stomach is within normal limits. No evidence of appendicitis.    The liver is unremarkable. The gallbladder is surgically absent.  The pancreas, spleen and adrenals are unremarkable. No aortic aneurysm is identified. The kidneys and ureters are unremarkable. The bladder is within normal limits.  No pathologically enlarged lymph nodes are identified.                                      The Emergency Provider reviewed the vital signs and test results, which are outlined above.     ED Discussion     9:04 PM: Pt has an iodine allergy. Pt reports she usually just takes Benadryl prior to CT scan.     12:00 AM: Reassessed pt at this time. Discussed with pt all pertinent ED information and results. Discussed pt dx and plan of tx. Gave pt all f/u and return to the ED instructions. All questions and concerns were addressed at this time. Pt expresses understanding of information and instructions, and is comfortable with plan to discharge. Pt is stable for discharge.    I discussed with patient and/or family/caretaker that evaluation in the ED does not suggest any emergent or life threatening medical conditions requiring immediate intervention beyond what was provided  in the ED, and I believe patient is safe for discharge.  Regardless, an unremarkable evaluation in the ED does not preclude the development or presence of a serious of life threatening condition. As such, patient was instructed to return immediately for any worsening or change in current symptoms.      ED Medication(s):  Medications   sodium chloride 0.9% bolus 1,000 mL (0 mLs Intravenous Stopped 11/23/18 2302)   morphine injection 4 mg (4 mg Intravenous Given 11/23/18 2118)   ondansetron injection 4 mg (4 mg Intravenous Given 11/23/18 2119)   diphenhydrAMINE injection 25 mg (25 mg Intravenous Given 11/23/18 2119)   omnipaque 350 iohexol 75 mL (75 mLs Intravenous Given 11/23/18 2225)   ciprofloxacin HCl tablet 500 mg (500 mg Oral Given 11/24/18 0011)   metroNIDAZOLE tablet 500 mg (500 mg Oral Given 11/24/18 0011)   HYDROcodone-acetaminophen 7.5-325 mg per tablet 1 tablet (1 tablet Oral Given 11/24/18 0011)     Current Discharge Medication List         Current Discharge Medication List      START taking these medications    Details   ciprofloxacin HCl (CIPRO) 500 MG tablet Take 1 tablet (500 mg total) by mouth 2 (two) times daily. for 7 days  Qty: 14 tablet, Refills: 0      metroNIDAZOLE (FLAGYL) 500 MG tablet Take 1 tablet (500 mg total) by mouth 3 (three) times daily. for 7 days  Qty: 21 tablet, Refills: 0             Follow-up Information     Primary Care Physician In 3 days.           [facility]    Specialty:  Emergency Medicine  Why:  As needed, If symptoms worsen  Contact information:  [id number] [facility] [address] [phone]                    Medical Decision Making:   Clinical Tests:   Lab Tests: Ordered and Reviewed  Radiological Study: Ordered and Reviewed             Scribe Attestation:   Scribe #1: I performed the above scribed service and the documentation accurately describes the services I performed. I attest to the accuracy of the note.      Attending:   Physician Attestation Statement for Scribe #1: I, Chuck Flores MD, personally performed the services described in this documentation, as scribed by Charlette Rasmussen, in my presence, and it is both accurate and complete.           Clinical Impression       ICD-10-CM ICD-9-CM   1. Diverticulitis K57.92 562.11       Disposition:   Disposition: Discharged  Condition: Stable         Chuck Flores MD  11/24/18 0020

## 2018-11-26 ENCOUNTER — TELEPHONE (OUTPATIENT)
Dept: SMOKING CESSATION | Facility: CLINIC | Age: 48
End: 2018-11-26

## 2018-11-26 NOTE — TELEPHONE ENCOUNTER
Attempted to contact patient regarding missed intake, Left message with name Ricky Mancilla and return phone number [phone].

## 2018-12-10 ENCOUNTER — TELEPHONE (OUTPATIENT)
Dept: SMOKING CESSATION | Facility: CLINIC | Age: 48
End: 2018-12-10

## 2018-12-10 NOTE — TELEPHONE ENCOUNTER
Left message about missed intake for the tobacco cessation program and asked if the patient would like to reschedule. Left my name Ricky Mancilla with phone number [phone].

## 2018-12-11 ENCOUNTER — TELEPHONE (OUTPATIENT)
Dept: SMOKING CESSATION | Facility: CLINIC | Age: 48
End: 2018-12-11

## 2018-12-11 NOTE — TELEPHONE ENCOUNTER
Left message about missed intake for the tobacco cessation program and asked if the patient would like to reschedule. Left my name Ricky Mancilla with phone number [phone].

## 2019-01-23 NOTE — PLAN OF CARE
Bedside shift change report given to [facility] 74. (oncoming nurse) by Evan Sauer (offgoing nurse). Report included the following information SBAR. Problem: Patient Care Overview  Goal: Plan of Care Review  Outcome: Ongoing (interventions implemented as appropriate)  Reviewed POC, including indications and possible side effects of administered medications. Pt verbalized understanding and teach back.    12 hour chart check complete.

## 2019-11-15 ENCOUNTER — HOSPITAL ENCOUNTER (OUTPATIENT)
Dept: RADIOLOGY | Facility: HOSPITAL | Age: 49
Discharge: HOME | End: 2019-11-15
Attending: FAMILY MEDICINE
Payer: COMMERCIAL

## 2019-11-15 ENCOUNTER — OFFICE VISIT (OUTPATIENT)
Dept: INTERNAL MEDICINE | Facility: CLINIC | Age: 49
End: 2019-11-15
Payer: COMMERCIAL

## 2019-11-15 VITALS
HEART RATE: 72 BPM | HEIGHT: 66 IN | WEIGHT: 293 LBS | SYSTOLIC BLOOD PRESSURE: 142 MMHG | DIASTOLIC BLOOD PRESSURE: 88 MMHG | BODY MASS INDEX: 47.09 KG/M2

## 2019-11-15 DIAGNOSIS — R52 PAIN: Primary | ICD-10-CM

## 2019-11-15 DIAGNOSIS — I10 ESSENTIAL HYPERTENSION: ICD-10-CM

## 2019-11-15 DIAGNOSIS — M17.12 PRIMARY OSTEOARTHRITIS OF LEFT KNEE: Primary | ICD-10-CM

## 2019-11-15 DIAGNOSIS — R52 PAIN: ICD-10-CM

## 2019-11-15 DIAGNOSIS — Z72.0 TOBACCO ABUSE: ICD-10-CM

## 2019-11-15 PROCEDURE — 3077F PR MOST RECENT SYSTOLIC BLOOD PRESSURE >= 140 MM HG: ICD-10-PCS | Mod: CPTII,S$GLB,, | Performed by: FAMILY MEDICINE

## 2019-11-15 PROCEDURE — 99999 PR PBB SHADOW E&M-EST. PATIENT-LVL III: CPT | Mod: PBBFAC,,, | Performed by: FAMILY MEDICINE

## 2019-11-15 PROCEDURE — 73564 XR KNEE ORTHO LEFT WITH FLEXION: ICD-10-PCS | Mod: 26,LT,, | Performed by: RADIOLOGY

## 2019-11-15 PROCEDURE — 20610 DRAIN/INJ JOINT/BURSA W/O US: CPT | Mod: S$GLB,,, | Performed by: FAMILY MEDICINE

## 2019-11-15 PROCEDURE — 73562 X-RAY EXAM OF KNEE 3: CPT | Mod: 26,59,RT, | Performed by: RADIOLOGY

## 2019-11-15 PROCEDURE — 73564 X-RAY EXAM KNEE 4 OR MORE: CPT | Mod: 26,LT,, | Performed by: RADIOLOGY

## 2019-11-15 PROCEDURE — 3079F DIAST BP 80-89 MM HG: CPT | Mod: CPTII,S$GLB,, | Performed by: FAMILY MEDICINE

## 2019-11-15 PROCEDURE — 3079F PR MOST RECENT DIASTOLIC BLOOD PRESSURE 80-89 MM HG: ICD-10-PCS | Mod: CPTII,S$GLB,, | Performed by: FAMILY MEDICINE

## 2019-11-15 PROCEDURE — 99203 OFFICE O/P NEW LOW 30 MIN: CPT | Mod: 25,S$GLB,, | Performed by: FAMILY MEDICINE

## 2019-11-15 PROCEDURE — 20610 LARGE JOINT ASPIRATION/INJECTION: L KNEE: ICD-10-PCS | Mod: S$GLB,,, | Performed by: FAMILY MEDICINE

## 2019-11-15 PROCEDURE — 3008F PR BODY MASS INDEX (BMI) DOCUMENTED: ICD-10-PCS | Mod: CPTII,S$GLB,, | Performed by: FAMILY MEDICINE

## 2019-11-15 PROCEDURE — 3008F BODY MASS INDEX DOCD: CPT | Mod: CPTII,S$GLB,, | Performed by: FAMILY MEDICINE

## 2019-11-15 PROCEDURE — 73562 X-RAY EXAM OF KNEE 3: CPT | Mod: TC,RT

## 2019-11-15 PROCEDURE — 99999 PR PBB SHADOW E&M-EST. PATIENT-LVL III: ICD-10-PCS | Mod: PBBFAC,,, | Performed by: FAMILY MEDICINE

## 2019-11-15 PROCEDURE — 99203 PR OFFICE/OUTPT VISIT, NEW, LEVL III, 30-44 MIN: ICD-10-PCS | Mod: 25,S$GLB,, | Performed by: FAMILY MEDICINE

## 2019-11-15 PROCEDURE — 3077F SYST BP >= 140 MM HG: CPT | Mod: CPTII,S$GLB,, | Performed by: FAMILY MEDICINE

## 2019-11-15 PROCEDURE — 73562 XR KNEE ORTHO LEFT WITH FLEXION: ICD-10-PCS | Mod: 26,59,RT, | Performed by: RADIOLOGY

## 2019-11-15 RX ORDER — TRAMADOL HYDROCHLORIDE 50 MG/1
50 TABLET ORAL EVERY 8 HOURS PRN
Qty: 21 TABLET | Refills: 0 | Status: SHIPPED | OUTPATIENT
Start: 2019-11-15 | End: 2021-06-16

## 2019-11-15 RX ORDER — TRIAMCINOLONE ACETONIDE 40 MG/ML
80 INJECTION, SUSPENSION INTRA-ARTICULAR; INTRAMUSCULAR
Status: DISCONTINUED | OUTPATIENT
Start: 2019-11-15 | End: 2019-11-15 | Stop reason: HOSPADM

## 2019-11-15 RX ORDER — TRAMADOL HYDROCHLORIDE 50 MG/1
50 TABLET ORAL EVERY 8 HOURS PRN
Qty: 21 TABLET | Refills: 0 | Status: SHIPPED | OUTPATIENT
Start: 2019-11-15 | End: 2019-11-15 | Stop reason: SDUPTHER

## 2019-11-15 RX ADMIN — TRIAMCINOLONE ACETONIDE 80 MG: 40 INJECTION, SUSPENSION INTRA-ARTICULAR; INTRAMUSCULAR at 10:11

## 2019-11-15 NOTE — PROCEDURES
Large Joint Aspiration/Injection: L knee  Date/Time: 11/15/2019 10:20 AM  Performed by: Mick Molina MD  Authorized by: Mick Molina MD     Consent Done?:  Yes (Verbal)  Indications:  Diagnostic evaluation and pain  Procedure site marked: Yes    Timeout: Prior to procedure the correct patient, procedure, and site was verified    Anesthesia  Local anesthesia used  Anesthesia: local infiltration  Anesthetic: topical anesthetic and lidocaine 2% without epinephrine  Anesthetic total: 8mL    Location:  Knee  Site:  L knee  Prep: Patient was prepped and draped in usual sterile fashion    Needle size:  25 G  Ultrasonic Guidance for needle placement: No  Approach:  Anterolateral  Medications:  80 mg triamcinolone acetonide 40 mg/mL  Patient tolerance:  Patient tolerated the procedure well with no immediate complications    Additional Comments: Verbal consent was obtained prior to the procedure.  Explained to the patient that there is a small risk of persistent pain, bleeding, and infection.  Discussed with the patient the steps of the procedure, including but not limited to, needle size and length, location of the injection, sterile technique using ChloraPrep and alcohol swabs, local anesthesia, risks/benefits, side effects, and alternatives including not performing the procedure.  Patient expressed understanding and verbally consented to the procedure after all pertinent questions related to the procedure were answered and explained before performing the procedure.    Patient experienced minimal blood loss (< 3 cc) and clean bandage was applied. Post procedure care was provided to the patient verbally and with their AVS. Patient was instructed to take it easy for the next 24-48 hours and was informed that their pain may increase once the anaesthesia wears off and before the steroid begins to work. Patient was instructed to ice area for 15 minutes and may take NSAIDs or Tylenol PRN if pain worsens.  Patient was informed to contact clinic or go to the ED if they develop any fever, chills, redness, swelling, or other complications.

## 2019-11-15 NOTE — ED NOTES
Level of Consciousness: Patient is awake, alert, oriented to person, place, time, and situation.   Appearance: Pt resting comfortably in stretcher, acute painful distress. Clothing appropriately placed and clean. Hygiene is appropriate.   Skin: Skin is warm, dry, and intact. Skin turgor is normal/elastic. Mucous membranes moist. Skin color is normal for ethnicity. No skin breakdown noted.   Musculoskeletal: Moves all extremities well. Full active ROM. No deformities noted. Denies any weakness. Gait steady, ambulates without use of assistive devices.   Respiratory: Airway open and patent. Respirations equal and unlabored. Breath sounds clear to auscultation. Denies any SOB.   Cardiac: Regular rate and rhythm. No peripheral edema noted. Radial and pedal pulses present and normal. Capillary refill is within normal limits. Denies chest pain.   GI: Abdomen soft. Bowel sounds present and active in all quads. Abdomen symmetric with no distention noted, obese.  Pt c/o n/v with abdominal pain  Neurological: Symmetrical expressions noted to face. Equal bilateral . Normal sensation reported to all extremities. No obvious neurological deficits noted.   Psychosocial: Speech spontaneous, clear, and coherent.     Myalgia Monitoring: I explained this is common when taking isotretinoin. If this worsens they will contact us. Hypercholesterolemia Monitoring: I explained this is common when taking isotretinoin. We will monitor closely. Myalgia Treatment: I explained this is common when taking isotretinoin. If this worsens they will contact us. They may try OTC ibuprofen. Lower Range (In Mg/Kg): 120 Weight Units: pounds Headache Monitoring: I recommended monitoring the headaches for now. There is no evidence of increased intracranial pressure. They were instructed to call if the headaches are worsening. Kilograms Preamble Statement (Weight Entered In Details Tab): Reported Weight in kilograms: Xerosis Normal Treatment: I recommended application of Cetaphil or CeraVe numerous times a day and before going to bed to all dry areas. Pounds Preamble Statement (Weight Entered In Details Tab): Reported Weight in pounds: Upper Range (In Mg/Kg): 150 Target Cumulative Dosage (In Mg/Kg): 135 Cheilitis Normal Treatment: I recommended application of Vaseline or Aquaphor numerous times a day (as often as every hour) and before going to bed. Retinoid Dermatitis Normal Treatment: I recommended more frequent application of Cetaphil or CeraVe to the areas of dermatitis. Male Completion Statement: After discussing his treatment course we decided to discontinue isotretinoin therapy at this time. He shouldn't donate blood for one month after the last dose. He should call with any new symptoms of depression. Female Completion Statement: After discussing her treatment course we decided to discontinue isotretinoin therapy at this time. I explained that she would need to continue her birth control methods for at least one month after the last dosage. She should also get a pregnancy test one month after the last dose. She shouldn't donate blood for one month after the last dose. She should call with any new symptoms of depression. Show Text Field For Brand Names Of Contraception?: Yes Xerosis Aggressive Treatment: I recommended application of Cetaphil or CeraVe numerous times a day and before going to bed to all dry areas. I also prescribed a topical steroid for twice daily use. Cheilitis Aggressive Treatment: I recommended application of Vaseline or Aquaphor numerous times a day (as often as every hour) and before going to bed. I also prescribed a topical steroid for twice daily use. Months Of Therapy Completed: 1 Retinoid Dermatitis Aggressive Treatment: I recommended more frequent application of Cetaphil or CeraVe to the areas of dermatitis. I also prescribed a topical steroid for twice daily use until the dermatitis resolves. Nosebleeds Normal Treatment: I explained this is common when taking isotretinoin. I recommended saline mist in each nostril multiple times a day. If this worsens they will contact us. Use Enhanced Counseling Feature (Automatic): No Detail Level: Zone Dosing Month 1 (Required For Cumulative Dosing): 40mg Daily Dosing Month 2 (Required For Cumulative Dosing): 30mg BID What Is The Patient's Gender: Male Xerosis Normal Treatment: I recommended application of Cetaphil or CeraVe numerous times a day going to bed to all dry areas. Xerosis Aggressive Treatment: I recommended application of Cetaphil or CeraVe numerous times a day going to bed to all dry areas. I also prescribed a topical steroid for twice daily use. Counseling Text: I reviewed the side effect in detail. Patient should get monthly blood tests, not donate blood, not drive at night if vision affected, and not share medication. Use Therapeutic Ranged Or Therapeutic Target: please select Range or Target Female Pregnancy Counseling Text: Female patients should also be on two forms of birth control while taking this medication and for one month after their last dose. Next Month's Dosage: Continue Current Dosage

## 2019-11-15 NOTE — PROGRESS NOTES
Subjective:     Patient ID: Laura Vora is a 49 y.o. female.    Chief Complaint: Knee Pain    Patient is a 49-year-old female presents clinic today complaining of left knee pain that occurred this morning.  Patient states that she was getting out of her car and twisted her knee.  Patient localizes pain to the medial and lateral aspect of her knee.  States she has a history of knee cap dislocations, but does not feel like her kneecap dislocated today.  Patient states that is been painful and sore.  States she has a history of chronic back pain with sciatica.  Patient states that after she hurt her knee this morning, her back is been more sore too.  Patient states that she has been on numerous NSAIDs, nerve pain medications, topical creams, and physical therapy in the past without significant relief.  Patient states that she has Norco at home, and that tramadol has sometimes helped in the past.  Denies any previous cortisone injections in the left knee.  States she is scheduled to have nerve burns in her back in the next 1-2 weeks.      Past Medical History:   Diagnosis Date    Bronchitis     Diverticulitis     HTN (hypertension)     Obese     Tobacco use      Past Surgical History:   Procedure Laterality Date    APPENDECTOMY      CHOLECYSTECTOMY      HYSTERECTOMY       Family History   Problem Relation Age of Onset    Hypertension Unknown     Diabetes Mother     Hypertension Mother     Heart disease Mother     Cancer Sister     Cancer Paternal Aunt      Social History     Socioeconomic History    Marital status: Single     Spouse name: Not on file    Number of children: Not on file    Years of education: Not on file    Highest education level: Not on file   Occupational History    Not on file   Social Needs    Financial resource strain: Not on file    Food insecurity:     Worry: Not on file     Inability: Not on file    Transportation needs:     Medical: Not on file     Non-medical: Not on  file   Tobacco Use    Smoking status: Current Every Day Smoker     Packs/day: 0.50     Last attempt to quit: 2015     Years since quittin.8    Smokeless tobacco: Never Used   Substance and Sexual Activity    Alcohol use: Yes     Comment: occasionally    Drug use: No    Sexual activity: Not on file   Lifestyle    Physical activity:     Days per week: Not on file     Minutes per session: Not on file    Stress: Not on file   Relationships    Social connections:     Talks on phone: Not on file     Gets together: Not on file     Attends Cheondoism service: Not on file     Active member of club or organization: Not on file     Attends meetings of clubs or organizations: Not on file     Relationship status: Not on file   Other Topics Concern    Not on file   Social History Narrative    Not on file     Medication List with Changes/Refills   New Medications    TRAMADOL (ULTRAM) 50 MG TABLET    Take 1 tablet (50 mg total) by mouth every 8 (eight) hours as needed for Pain.   Current Medications    ALBUTEROL 90 MCG/ACTUATION INHALER    Inhale 2 puffs into the lungs every 4 (four) hours as needed for Wheezing.    AMOXICILLIN-CLAVULANATE 875-125MG (AUGMENTIN) 875-125 MG PER TABLET    Take 1 tablet by mouth 2 (two) times daily.    GUAIFENESIN-CODEINE 100-10 MG/5 ML (TUSSI-ORGANIDIN NR)  MG/5 ML SYRUP    Take 5 mLs by mouth every 6 (six) hours as needed for Cough.    HYDROCHLOROTHIAZIDE (HYDRODIURIL) 25 MG TABLET    Take 25 mg by mouth once daily.    IBUPROFEN (ADVIL,MOTRIN) 800 MG TABLET    Take 1 tablet (800 mg total) by mouth every 6 (six) hours as needed for Pain.    LISINOPRIL (PRINIVIL,ZESTRIL) 40 MG TABLET    Take 40 mg by mouth once daily.    NICOTINE (NICODERM CQ) 21 MG/24 HR    Place 1 patch onto the skin once daily.    VARENICLINE (CHANTIX STARTING MONTH BOX) 0.5 MG (11)- 1 MG (42) TABLET    Take one 0.5mg tab by mouth once daily X3 days,then increase to one 0.5mg tab twice daily X4 days,then  "increase to one 1mg tab twice daily     Review of patient's allergies indicates:   Allergen Reactions    Iodine and iodide containing products Hives    Bactrim [sulfamethoxazole-trimethoprim] Hives     Review of Systems   Constitutional: Negative for chills, fever and malaise/fatigue.   HENT: Negative for hearing loss.    Eyes: Negative for redness.   Cardiovascular: Negative for leg swelling.   Gastrointestinal: Negative for nausea and vomiting.   Musculoskeletal: Positive for joint pain. Negative for back pain, falls and myalgias.   Skin: Negative for rash.   Neurological: Negative for tingling, sensory change, focal weakness and weakness.        Objective:   Body mass index is 51.81 kg/m².  Vitals:    11/15/19 1107   BP: (!) 142/88   Pulse: 72   Weight: (!) 145.6 kg (321 lb)   Height: 5' 6" (1.676 m)   PainSc:   8   PainLoc: Knee           General    Nursing note and vitals reviewed.  Constitutional: She is oriented to person, place, and time. She appears well-developed and well-nourished. No distress.   HENT:   Head: Normocephalic and atraumatic.   Eyes: Conjunctivae are normal. No scleral icterus.   Pulmonary/Chest: Effort normal.   Neurological: She is alert and oriented to person, place, and time.   Psychiatric: She has a normal mood and affect. Her behavior is normal. Judgment and thought content normal.     General Musculoskeletal Exam   Gait: abnormal and antalgic         Left Knee Exam     Inspection   Erythema: absent  Swelling: absent  Effusion: absent  Deformity: absent    Tenderness   The patient tender to palpation of the medial joint line, MCL, lateral joint line and lateral retinaculum.    Range of Motion   The patient has normal left knee ROM.    Tests   Stability Lachman: normal (-1 to 2mm) PCL-Posterior Drawer: normal (0 to 2mm)  MCL - Valgus: abnormal - grade I  LCL - Varus: normal (0 to 2mm)  Patella   Patellar apprehension: negative  Passive Patellar Tilt: neutral  Patellar Tracking: " normal    Other   Sensation: normal    Muscle Strength   Left Lower Extremity   Quadriceps:  5/5         EXAMINATION:  XR KNEE ORTHO LEFT WITH FLEXION    CLINICAL HISTORY:  . Pain, unspecified    TECHNIQUE:  AP standing view of both knees, PA flexion standing views of both knees, and Merchant views of both knees were performed. A lateral view of the left knee was also performed.    COMPARISON:  None    FINDINGS:  No acute osseous abnormality.  Joint spaces maintained with mild tricompartmental degenerative osteophyte changes bilaterally.  No large joint effusion.      Impression       As above      Electronically signed by: Darrian Mayfield MD  Date: 11/15/2019  Time: 10:49         Laura was seen today for knee pain.    Diagnoses and all orders for this visit:    Primary osteoarthritis of left knee  -     Discontinue: traMADol (ULTRAM) 50 mg tablet; Take 1 tablet (50 mg total) by mouth every 8 (eight) hours as needed for Pain.  -     Large Joint Aspiration/Injection: L knee  -     triamcinolone acetonide injection 80 mg  -     traMADol (ULTRAM) 50 mg tablet; Take 1 tablet (50 mg total) by mouth every 8 (eight) hours as needed for Pain.    Essential hypertension    Tobacco abuse    -discuss clinical course and nature of mild osteoarthritis and MCL sprain with patient.  At this time, I would recommend a conservative approach with cortisone injection, tramadol as needed, and Tylenol.  If patient does not get significant relief, would recommend formal physical therapy.  -left knee Xray images were independently viewed and read by me showing no acute fractures or dislocations.  Patella is in joint.  Patient has mild medial joint space loss and degenerative changes  -Formal read by radiologist is as described above  -Discussed findings with patient    -Chronic hypertension.  Managed by patient's PCP.  -Chronic tobacco use.  Managed by patient's PCP.  Currently enrolled in smoking cessation program.     -Treatment options  and alternatives were discussed with the patient. Patient expressed understanding. Patient was given the opportunity to ask questions and be an active participant in their medical care. Patient had no further questions or concerns at this time.   -Patient is an overall moderate risk for health complications from their medical conditions.

## 2020-04-21 DIAGNOSIS — Z01.84 ANTIBODY RESPONSE EXAMINATION: ICD-10-CM

## 2020-04-23 ENCOUNTER — LAB VISIT (OUTPATIENT)
Dept: LAB | Facility: HOSPITAL | Age: 50
End: 2020-04-23
Attending: INTERNAL MEDICINE
Payer: COMMERCIAL

## 2020-04-23 DIAGNOSIS — Z01.84 ANTIBODY RESPONSE EXAMINATION: ICD-10-CM

## 2020-04-23 LAB — SARS-COV-2 IGG SERPL QL IA: NEGATIVE

## 2020-04-23 PROCEDURE — 86769 SARS-COV-2 COVID-19 ANTIBODY: CPT

## 2020-04-23 PROCEDURE — 36415 COLL VENOUS BLD VENIPUNCTURE: CPT

## 2020-07-10 DIAGNOSIS — M25.562 LEFT KNEE PAIN, UNSPECIFIED CHRONICITY: Primary | ICD-10-CM

## 2020-07-20 DIAGNOSIS — M25.561 BILATERAL KNEE PAIN: Primary | ICD-10-CM

## 2020-07-20 DIAGNOSIS — M25.562 BILATERAL KNEE PAIN: Primary | ICD-10-CM

## 2020-07-24 ENCOUNTER — CLINICAL SUPPORT (OUTPATIENT)
Dept: REHABILITATION | Facility: HOSPITAL | Age: 50
End: 2020-07-24
Payer: COMMERCIAL

## 2020-07-24 DIAGNOSIS — M25.562 CHRONIC PAIN OF BOTH KNEES: ICD-10-CM

## 2020-07-24 DIAGNOSIS — M25.361 INSTABILITY OF RIGHT KNEE JOINT: ICD-10-CM

## 2020-07-24 DIAGNOSIS — R26.9 GAIT ABNORMALITY: ICD-10-CM

## 2020-07-24 DIAGNOSIS — R29.898 WEAKNESS OF BOTH HIPS: ICD-10-CM

## 2020-07-24 DIAGNOSIS — G89.29 CHRONIC PAIN OF BOTH KNEES: ICD-10-CM

## 2020-07-24 DIAGNOSIS — M25.561 CHRONIC PAIN OF BOTH KNEES: ICD-10-CM

## 2020-07-24 PROCEDURE — 97110 THERAPEUTIC EXERCISES: CPT

## 2020-07-24 PROCEDURE — 97161 PT EVAL LOW COMPLEX 20 MIN: CPT

## 2020-07-24 NOTE — PLAN OF CARE
[facility]  Physical Therapy Initial Evaluation    Name: Laura Vora  Clinic Number: [id number]    Therapy Diagnosis:   Encounter Diagnoses   Name Primary?    Chronic pain of both knees     Gait abnormality     Weakness of both hips     Instability of right knee joint      Physician: Edgar Hickey MD    Physician Orders: PT Eval and Treat   Medical Diagnosis from Referral: Bilateral knee pain  Evaluation Date: 7/24/2020  Authorization Period Expiration: 10/24/2020  Plan of Care Expiration: 10/24/2020  Visit # / Visits authorized: 1/ 12  FOTO: 1/10    Time In: 7:00  Time Out: 8:00am  Total Billable Time: 25 minutes (LOW Complexity Evaluation, Therapeutic Exercise - 25)    Precautions: Standard,     Subjective   Date of onset: 11/2019  History of current condition - Laura reports: bilateral knee pain which is chronic in nature and progressing in intensity. Reports bilateral knee pain with symptoms of edema, popping and instability on right greater than left. Received bilateral knee injections recently which has reduced her current pain.     Medical History:   Past Medical History:   Diagnosis Date    Bronchitis     Diverticulitis     HTN (hypertension)     Obese     Tobacco use        Surgical History:   Laura Vora  has a past surgical history that includes Cholecystectomy; Appendectomy; and Hysterectomy.    Medications:   Laura has a current medication list which includes the following prescription(s): albuterol, amoxicillin-clavulanate 875-125mg, guaifenesin-codeine 100-10 mg/5 ml, hydrochlorothiazide, ibuprofen, zenpep, lisinopril, nicotine, tramadol, and varenicline.    Allergies:   Review of patient's allergies indicates:   Allergen Reactions    Iodine and iodide containing products Hives    Bactrim [sulfamethoxazole-trimethoprim] Hives        Imaging, Xrays: unremarkable in November    Prior Therapy: Received PT without positive results in the past  Social  History:  lives with their family  Occupation: MA  Prior Level of Function: Progressive reduction in activity  Current Level of Function: Difficult to ambulate and stand long periods which impacts her occupational duties    Pain:   Current 3/10, worst 7/10, best 2/10   Location: bilateral knee   Description: Aching  Aggravating Factors: Walking, Extension and Getting out of bed/chair  Easing Factors: ice and rest    Pts goals: Walk without pain.    Objective     Posture: Stands with right knee hyperextension and foot supination  Palpation: Point tenderness to anteromedial right knee and posterior left knee  Sensation: Intact  DTRs: NT due to pain  Range of Motion/Strength:       Thoracolumbar AROM Pain/Dysfunction with Movement   Flexion WFL    Extension 50%    Right side bending 50%    Left side bending 50%    Right rotation WFL    Left rotation WFL        Hip Right Left Pain/Dysfunction with Movement   AROM/PROM      flexion WFL WFL    extension WFL WFL    abduction WFL WFL    adduction WFL WFL    Internal rotation WFL WFL    External rotation WFL WFL      Knee Right Left Pain/Dysfunction with Movement   AROM/PROM      flexion 122  120    extension 0 0        L/E MMT Right Left Pain/Dysfunction with Movement   Hip Flexion 4+/5 4+/5    Hip Extension 3+/5 3+/5    Hip Abduction 4-/5 4-/5    Hip Adduction 4-/5 4-/5    Hip IR 4/5 4/5    Hip ER 3+/5 3+/5    Knee Flexion 4+/5 4+/5    Knee Extension 3+/5 3+/5    Ankle DF 5/5 5/5    Ankle PF 5/5 5/5    Ankle Inversion 5/5 5/5    Ankle Eversion 5/5 5/5    Big Toe Extension 5/5 5/5        Joint Mobility:   - Other:  Noted knee hyperextension in standing bilaterally    Flexibility: Minimal medial hamstring flexibility defecits on right    Special Tests: (-) slump, SLR and irma bilaterally    Gait Analysis:Without AD  Deviations: Ambulates with increased lateral trunk flexion bilaterally however greater toward right with noted ankle supination landing on lateral aspect of  foot resulting in femoral anteversion and genu valgum.       CMS Impairment/Limitation/Restriction for FOTO Survey    Therapist reviewed FOTO scores for Laura Vora on 7/24/2020.   FOTO documents entered into [employer] - see Media section.    Limitation Score: 49%  Category: Other    Current : CK = at least 40% but < 60% impaired, limited or restricted  Goal: CJ = at least 20% but < 40% impaired, limited or restricted         TREATMENT   Treatment Time In: 7:35  Treatment Time Out: 8:00  Total Treatment time separate from Evaluation: 25 minutes    Laura received therapeutic exercises to develop strength, flexibility and posture for 25 minutes including:  SLR with ER  SLR ABD  SLR ext  Bridges  Prone TKE  Hamstring stretches  IT band stretch  Standing 3 way SLR with emphasis on postural correction      Home Exercises Provided and Patient Education Provided     Education provided:   - gait, posture, joint protection techniques    Written Home Exercises Provided: yes.  Exercises were reviewed and Laura was able to demonstrate them prior to the end of the session.  Laura demonstrated good  understanding of the education provided.     See EMR under Patient Instructions for exercises provided 7/24/2020.      Assessment   Laura is a 50 y.o. female referred to outpatient Physical Therapy with a medical diagnosis of bilateral knee pain. Pt presents ambulating into clinic with left knee  brace donned. Ambulates with increased lateral trunk flexion bilaterally right greater than left resulting in excessive genu valgum. Standing posture limited due to hyperextension on right. No edema currently but reports regular edema after prolonged standing and walking. Exhibits bilateral knee and hip weakness as well as medial hamstring flexibility deficits and reduced trunk extension. Patient reports intermittent radicular pain in left LE but has a negative SLR and slump test currently.     Pt prognosis is Fair.   Pt will benefit from  skilled outpatient Physical Therapy to address the deficits stated above and in the chart below, provide pt/family education, and to maximize pt's level of independence.     Plan of care discussed with patient: Yes  Pt's spiritual, cultural and educational needs considered and patient is agreeable to the plan of care and goals as stated below:     Anticipated Barriers for therapy: chronic pain    Medical Necessity is demonstrated by the following  History  Co-morbidities and personal factors that may impact the plan of care Co-morbidities:   high BMI    Personal Factors:   no deficits     low   Examination  Body Structures and Functions, activity limitations and participation restrictions that may impact the plan of care Body Regions:   lower extremities    Body Systems:    gross symmetry  strength  gross coordinated movement  gait  transfers    Participation Restrictions:   none    Activity limitations:   Learning and applying knowledge  no deficits    General Tasks and Commands  no deficits    Communication  no deficits    Mobility  lifting and carrying objects  walking    Self care  no deficits    Domestic Life  shopping  doing house work (cleaning house, washing dishes, laundry)    Interactions/Relationships  no deficits    Life Areas  no deficits    Community and Social Life  community life  recreation and leisure         low   Clinical Presentation stable and uncomplicated low   Decision Making/ Complexity Score: low       Goals:  Short Term Goals: 2 weeks   1. Patient will be independent with HEP  2. Patient will ambulate with postural stability and normal postural alignment  3. Bilateral hip and knee strength to improve by 1/2 MM grade    Long Term Goals: 6 weeks   1. Bilateral hip and knee strength to improve by 1 grade globally  2. Patient will report pain reduction by 20% while at work  3. FOTO will improve by 10 points    Plan   Plan of care Certification: 7/24/2020 to 9/24/2020.    Outpatient Physical  Therapy 2 times weekly for 6 weeks to include the following interventions: Electrical Stimulation IFC, Gait Training, Manual Therapy, Moist Heat/ Ice, Neuromuscular Re-ed, Patient Education, Therapeutic Activites and Therapeutic Exercise, ASTYM, Kinesiotaping PRN, Functional Dry Needling    Abrahan Valera, PT, DPT

## 2020-07-29 ENCOUNTER — CLINICAL SUPPORT (OUTPATIENT)
Dept: REHABILITATION | Facility: HOSPITAL | Age: 50
End: 2020-07-29
Payer: COMMERCIAL

## 2020-07-29 DIAGNOSIS — M25.561 CHRONIC PAIN OF BOTH KNEES: ICD-10-CM

## 2020-07-29 DIAGNOSIS — R29.898 WEAKNESS OF BOTH HIPS: ICD-10-CM

## 2020-07-29 DIAGNOSIS — M25.562 CHRONIC PAIN OF BOTH KNEES: ICD-10-CM

## 2020-07-29 DIAGNOSIS — G89.29 CHRONIC PAIN OF BOTH KNEES: ICD-10-CM

## 2020-07-29 DIAGNOSIS — R26.9 GAIT ABNORMALITY: ICD-10-CM

## 2020-07-29 DIAGNOSIS — M25.361 INSTABILITY OF RIGHT KNEE JOINT: ICD-10-CM

## 2020-07-29 PROCEDURE — 97110 THERAPEUTIC EXERCISES: CPT

## 2020-07-29 PROCEDURE — 97014 ELECTRIC STIMULATION THERAPY: CPT

## 2020-07-29 NOTE — PROGRESS NOTES
Physical Therapy Treatment Note     Name: Laura Garcia [facility] Number: [id number]    Therapy Diagnosis:   Encounter Diagnoses   Name Primary?    Chronic pain of both knees     Gait abnormality     Weakness of both hips     Instability of right knee joint      Physician: Edgar Hickey MD    Visit Date: 7/29/2020    Physician Orders: PT Eval and Treat   Medical Diagnosis from Referral: Bilateral knee pain  Evaluation Date: 7/24/2020  Authorization Period Expiration: 10/24/2020  Plan of Care Expiration: 10/24/2020  Visit # / Visits authorized: 2/ 12    Time In: 7:00  Time Out: 7:54  Total Billable Time: 40 minutes    Precautions: Standard    Subjective     Pt reports: bilateral knee pain upon arrival.  She was compliant with home exercise program.  Response to previous treatment: no change in pain  Functional change: improved gait     Pain: 8/10  Location: bilateral knee      Objective     Laura received therapeutic exercises to develop strength, flexibility and posture for 40 minutes including:  NU step for endurance and ROM  [place] EXT and ABD  SLR ABD  SLR EXT  Prone TKE  Mini squats  Bridges  Clams      Laura received the following supervised modalities after being cleared for contradictions: IFC Electrical Stimulation:  Laura received IFC Electrical Stimulation for pain control applied to the right knee. Pt received stimulation at 10 % scan at a frequency of 150 for 10 minutes. Laura tolderated treatment well without any adverse effects.      Laura received hot pack for 10 minutes to right knee.      Home Exercises Provided and Patient Education Provided     Education provided:   - Posture, gait    Written Home Exercises Provided: Patient instructed to cont prior HEP.  Exercises were reviewed and Laura was able to demonstrate them prior to the end of the session.  Laura demonstrated fair  understanding of the education provided.     See EMR under Patient Instructions for exercises provided prior  visit.    Assessment     Patient presents to clinic with reduced lateral trunk flexion but reports of 8/10 pain. Participated in bilateral hip and knee strength training while avoiding excessive loading of knee joints. Provided neuromuscular education with mini squats to allow for adequate loading of knees and avoiding genu valgum on right as well as excessive anterior weight translation.  Laura is progressing well towards her goals.   Pt prognosis is Good.     Pt will continue to benefit from skilled outpatient physical therapy to address the deficits listed in the problem list box on initial evaluation, provide pt/family education and to maximize pt's level of independence in the home and community environment.     Pt's spiritual, cultural and educational needs considered and pt agreeable to plan of care and goals.     Anticipated barriers to physical therapy: chronic pain  Goals:   Short Term Goals: 2 weeks   1. Patient will be independent with HEP  2. Patient will ambulate with postural stability and normal postural alignment  3. Bilateral hip and knee strength to improve by 1/2 MM grade     Long Term Goals: 6 weeks   1. Bilateral hip and knee strength to improve by 1 grade globally  2. Patient will report pain reduction by 20% while at work  3. FOTO will improve by 10 points    Plan     Progressive strengthening of bilateral hips and knees as well as neuromuscular reeducation to allow for adequate joint loading with reduced pain     Abrahan Valera, PT

## 2020-07-31 ENCOUNTER — OFFICE VISIT (OUTPATIENT)
Dept: PSYCHIATRY | Facility: CLINIC | Age: 50
End: 2020-07-31
Payer: COMMERCIAL

## 2020-07-31 DIAGNOSIS — F33.1 MODERATE EPISODE OF RECURRENT MAJOR DEPRESSIVE DISORDER: Primary | ICD-10-CM

## 2020-07-31 DIAGNOSIS — F41.0 GENERALIZED ANXIETY DISORDER WITH PANIC ATTACKS: ICD-10-CM

## 2020-07-31 DIAGNOSIS — F41.1 GENERALIZED ANXIETY DISORDER WITH PANIC ATTACKS: ICD-10-CM

## 2020-07-31 PROCEDURE — 90792 PSYCH DIAG EVAL W/MED SRVCS: CPT | Mod: 95,,, | Performed by: PSYCHIATRY & NEUROLOGY

## 2020-07-31 PROCEDURE — 90792 PR PSYCHIATRIC DIAGNOSTIC EVALUATION W/MEDICAL SERVICES: ICD-10-PCS | Mod: 95,,, | Performed by: PSYCHIATRY & NEUROLOGY

## 2020-07-31 RX ORDER — GABAPENTIN 600 MG/1
TABLET ORAL
COMMUNITY
Start: 2020-07-10 | End: 2020-07-31

## 2020-07-31 RX ORDER — FLUTICASONE PROPIONATE 44 UG/1
AEROSOL, METERED RESPIRATORY (INHALATION)
COMMUNITY
Start: 2019-11-14 | End: 2021-09-16 | Stop reason: SDUPTHER

## 2020-07-31 RX ORDER — AMLODIPINE BESYLATE 5 MG/1
5 TABLET ORAL NIGHTLY
COMMUNITY
End: 2023-05-19 | Stop reason: DRUGHIGH

## 2020-07-31 RX ORDER — ESCITALOPRAM OXALATE 10 MG/1
TABLET ORAL
COMMUNITY
End: 2020-07-31
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
## 2020-08-04 RX ORDER — DULOXETIN HYDROCHLORIDE 30 MG/1
30 CAPSULE, DELAYED RELEASE ORAL 2 TIMES DAILY
Qty: 30 CAPSULE | Refills: 11 | Status: SHIPPED | OUTPATIENT
Start: 2020-08-04 | End: 2020-09-01

## 2020-08-04 RX ORDER — ALPRAZOLAM 0.5 MG/1
0.5 TABLET ORAL 2 TIMES DAILY PRN
Qty: 60 TABLET | Refills: 2 | Status: SHIPPED | OUTPATIENT
Start: 2020-08-04 | End: 2020-11-17

## 2020-08-04 RX ORDER — QUETIAPINE FUMARATE 50 MG/1
50-100 TABLET, FILM COATED ORAL NIGHTLY
Qty: 60 TABLET | Refills: 11 | Status: SHIPPED | OUTPATIENT
Start: 2020-08-04 | End: 2021-04-09 | Stop reason: SDUPTHER

## 2020-08-05 ENCOUNTER — CLINICAL SUPPORT (OUTPATIENT)
Dept: REHABILITATION | Facility: HOSPITAL | Age: 50
End: 2020-08-05
Payer: COMMERCIAL

## 2020-08-05 DIAGNOSIS — G89.29 CHRONIC PAIN OF BOTH KNEES: ICD-10-CM

## 2020-08-05 DIAGNOSIS — R26.9 GAIT ABNORMALITY: ICD-10-CM

## 2020-08-05 DIAGNOSIS — M25.561 CHRONIC PAIN OF BOTH KNEES: ICD-10-CM

## 2020-08-05 DIAGNOSIS — M25.361 INSTABILITY OF RIGHT KNEE JOINT: ICD-10-CM

## 2020-08-05 DIAGNOSIS — R29.898 WEAKNESS OF BOTH HIPS: ICD-10-CM

## 2020-08-05 DIAGNOSIS — M25.562 CHRONIC PAIN OF BOTH KNEES: ICD-10-CM

## 2020-08-05 PROCEDURE — 97014 ELECTRIC STIMULATION THERAPY: CPT

## 2020-08-05 PROCEDURE — 97110 THERAPEUTIC EXERCISES: CPT

## 2020-08-05 NOTE — PROGRESS NOTES
Physical Therapy Treatment Note     Name: Laura Garcia [facility] Number: [id number]    Therapy Diagnosis:   Encounter Diagnoses   Name Primary?    Chronic pain of both knees     Gait abnormality     Weakness of both hips     Instability of right knee joint      Physician: Edgar Hickey MD    Visit Date: 8/5/2020    Physician Orders: PT Eval and Treat   Medical Diagnosis from Referral: Bilateral knee pain  Evaluation Date: 7/24/2020  Authorization Period Expiration: 10/24/2020  Plan of Care Expiration: 10/24/2020  Visit # / Visits authorized: 2/ 12    Time In: 7:00  Time Out: 7:50  Total Billable Time: 30 minutes    Precautions: Standard    Subjective     Pt reports: bilateral knee pain upon arrival which has increased due to a busy week.  She was compliant with home exercise program.  Response to previous treatment: no change in pain  Functional change: improved gait     Pain: 8/10  Location: bilateral knee      Objective     Laura received therapeutic exercises to develop strength, flexibility and posture for 30 minutes including:  NU step for endurance and ROM  [place] EXT and ABD  SLR ABD-Hold  SLR EXT  Prone TKE  Mini squats  Bridges  Clams      Laura received the following supervised modalities after being cleared for contradictions: IFC Electrical Stimulation:  Laura received IFC Electrical Stimulation for pain control applied to the bilateral knee. Pt received stimulation at 10 % scan at a frequency of 150 for 15 minutes. Laura tolderated treatment well without any adverse effects.      Laura received hot pack for 10 minutes to right knee.      Home Exercises Provided and Patient Education Provided     Education provided:   - Posture, gait    Written Home Exercises Provided: Patient instructed to cont prior HEP.  Exercises were reviewed and Laura was able to demonstrate them prior to the end of the session.  Laura demonstrated fair  understanding of the education provided.     See EMR under Patient  Instructions for exercises provided prior visit.    Assessment     Patient presents to clinic with increased bilateral knee pain which she reports as increased since the weekend. Reports increased activity that resulted in increased pain. Participated in strength training with reduced joint impact as well as postural correction to allow for enhanced joint protection. Limited today due to increased pain.   Laura is progressing well towards her goals.   Pt prognosis is Good.     Pt will continue to benefit from skilled outpatient physical therapy to address the deficits listed in the problem list box on initial evaluation, provide pt/family education and to maximize pt's level of independence in the home and community environment.     Pt's spiritual, cultural and educational needs considered and pt agreeable to plan of care and goals.     Anticipated barriers to physical therapy: chronic pain  Goals:   Short Term Goals: 2 weeks   1. Patient will be independent with HEP  2. Patient will ambulate with postural stability and normal postural alignment  3. Bilateral hip and knee strength to improve by 1/2 MM grade     Long Term Goals: 6 weeks   1. Bilateral hip and knee strength to improve by 1 grade globally  2. Patient will report pain reduction by 20% while at work  3. FOTO will improve by 10 points    Plan     Progressive strengthening of bilateral hips and knees as well as neuromuscular reeducation to allow for adequate joint loading with reduced pain     Abrahan Valera, PT

## 2020-08-06 NOTE — PROGRESS NOTES
Physical Therapy Treatment Note     Name: Laura Garcia [facility] Number: [id number]    Therapy Diagnosis:   Encounter Diagnoses   Name Primary?    Chronic pain of both knees     Gait abnormality     Weakness of both hips     Instability of right knee joint      Physician: Edgar Hickey MD    Visit Date: 8/7/2020    Physician Orders: PT Eval and Treat   Medical Diagnosis from Referral: Bilateral knee pain  Evaluation Date: 7/24/2020  Authorization Period Expiration: 10/24/2020  Plan of Care Expiration: 10/24/2020  Visit # / Visits authorized: 3/ 12    Time In: 7:05  Time Out: 7:55  Total Billable Time: 35 minutes    Precautions: Standard    Subjective     Pt reports: that she was up all night with her knees.  She was compliant with home exercise program.  Response to previous treatment: no change in pain  Functional change: improved gait     Pain: 8/10  Location: bilateral knee      Objective     Laura received therapeutic exercises to develop strength, flexibility and posture for 20 minutes including:  NU step x 5 min for endurance and ROM  Total gym 5 bands 4 min  SAQ 5 second holds  Tailgaters R side 7#  Standing gastroc stretch on wedge    Laura received manual therapy to B knees to improve muscle extensibility and decrease pain for 15 minutes including:  STM to R gastroc  STM to L lateral quad    Laura received the following supervised modalities after being cleared for contradictions: IFC Electrical Stimulation:  Laura received IFC Electrical Stimulation for pain control applied to the bilateral knee. Pt received stimulation at 10 % scan at a frequency of 150 for 10 minutes. Laura tolderated treatment well without any adverse effects.      Laura received hot pack for 10 minutes to right knee.      Home Exercises Provided and Patient Education Provided     Education provided:   - Posture, gait    Written Home Exercises Provided: Patient instructed to cont prior HEP.  Exercises were reviewed and Laura was able  to demonstrate them prior to the end of the session.  Laura demonstrated fair  understanding of the education provided.     See EMR under Patient Instructions for exercises provided prior visit.    Assessment   Patient demonstrated good tolerance to strengthening and joint nutrition. Performed rigid taping to R knee to improve adverse symptoms.    Laura is progressing well towards her goals.   Pt prognosis is Good.     Pt will continue to benefit from skilled outpatient physical therapy to address the deficits listed in the problem list box on initial evaluation, provide pt/family education and to maximize pt's level of independence in the home and community environment.     Pt's spiritual, cultural and educational needs considered and pt agreeable to plan of care and goals.     Anticipated barriers to physical therapy: chronic pain  Goals:   Short Term Goals: 2 weeks   1. Patient will be independent with HEP  2. Patient will ambulate with postural stability and normal postural alignment  3. Bilateral hip and knee strength to improve by 1/2 MM grade     Long Term Goals: 6 weeks   1. Bilateral hip and knee strength to improve by 1 grade globally  2. Patient will report pain reduction by 20% while at work  3. FOTO will improve by 10 points    Plan     Progressive strengthening of bilateral hips and knees as well as neuromuscular reeducation to allow for adequate joint loading with reduced pain     Mahesh Recinos, PT

## 2020-08-07 ENCOUNTER — CLINICAL SUPPORT (OUTPATIENT)
Dept: REHABILITATION | Facility: HOSPITAL | Age: 50
End: 2020-08-07
Payer: COMMERCIAL

## 2020-08-07 DIAGNOSIS — M25.361 INSTABILITY OF RIGHT KNEE JOINT: ICD-10-CM

## 2020-08-07 DIAGNOSIS — R29.898 WEAKNESS OF BOTH HIPS: ICD-10-CM

## 2020-08-07 DIAGNOSIS — R26.9 GAIT ABNORMALITY: ICD-10-CM

## 2020-08-07 DIAGNOSIS — G89.29 CHRONIC PAIN OF BOTH KNEES: ICD-10-CM

## 2020-08-07 DIAGNOSIS — M25.561 CHRONIC PAIN OF BOTH KNEES: ICD-10-CM

## 2020-08-07 DIAGNOSIS — M25.562 CHRONIC PAIN OF BOTH KNEES: ICD-10-CM

## 2020-08-07 PROCEDURE — 97140 MANUAL THERAPY 1/> REGIONS: CPT

## 2020-08-07 PROCEDURE — 97014 ELECTRIC STIMULATION THERAPY: CPT

## 2020-08-07 PROCEDURE — 97110 THERAPEUTIC EXERCISES: CPT

## 2020-08-14 ENCOUNTER — CLINICAL SUPPORT (OUTPATIENT)
Dept: REHABILITATION | Facility: HOSPITAL | Age: 50
End: 2020-08-14
Payer: COMMERCIAL

## 2020-08-14 DIAGNOSIS — G89.29 CHRONIC PAIN OF BOTH KNEES: ICD-10-CM

## 2020-08-14 DIAGNOSIS — R29.898 WEAKNESS OF BOTH HIPS: ICD-10-CM

## 2020-08-14 DIAGNOSIS — R26.9 GAIT ABNORMALITY: ICD-10-CM

## 2020-08-14 DIAGNOSIS — M25.561 CHRONIC PAIN OF BOTH KNEES: ICD-10-CM

## 2020-08-14 DIAGNOSIS — M25.562 CHRONIC PAIN OF BOTH KNEES: ICD-10-CM

## 2020-08-14 DIAGNOSIS — M25.361 INSTABILITY OF RIGHT KNEE JOINT: ICD-10-CM

## 2020-08-14 PROCEDURE — 97014 ELECTRIC STIMULATION THERAPY: CPT

## 2020-08-14 PROCEDURE — 97110 THERAPEUTIC EXERCISES: CPT

## 2020-08-14 PROCEDURE — 97140 MANUAL THERAPY 1/> REGIONS: CPT

## 2020-08-14 NOTE — PROGRESS NOTES
Physical Therapy Treatment Note     Name: Laura Garcia [facility] Number: [id number]    Therapy Diagnosis:   Encounter Diagnoses   Name Primary?    Chronic pain of both knees     Gait abnormality     Weakness of both hips     Instability of right knee joint      Physician: Edgar Hickey MD    Visit Date: 8/14/2020    Physician Orders: PT Eval and Treat   Medical Diagnosis from Referral: Bilateral knee pain  Evaluation Date: 7/24/2020  Authorization Period Expiration: 10/24/2020  Plan of Care Expiration: 10/24/2020  Visit # / Visits authorized: 4/ 12    Time In: 7:15  Time Out: 8:00  Total Billable Time: 35 minutes    Precautions: Standard    Subjective     Pt reports: continues to have shooting pains in her right knee with insidious onset  She was compliant with home exercise program.  Response to previous treatment: no change in pain  Functional change: improved gait     Pain: 8/10  Location: bilateral knee      Objective     Laura received therapeutic exercises to develop strength, flexibility and posture for 20 minutes including:  NU step x 5 min for endurance and ROM  Total gym 5 bands 4 min  SAQ 5 second holds  Tailgaters R side 7#  Standing gastroc stretch on wedge    Laura received manual therapy to B knees to improve muscle extensibility and decrease pain for 15 minutes including:  STM to R gastroc  STM to L lateral quad  Kinesiotaping to right knee  Lateral retinaculum stretch    Laura received the following supervised modalities after being cleared for contradictions: IFC Electrical Stimulation:  Laura received IFC Electrical Stimulation for pain control applied to the bilateral knee. Pt received stimulation at 10 % scan at a frequency of 150 for 10 minutes. Laura tolderated treatment well without any adverse effects.      Laura received hot pack for 10 minutes to right knee.      Home Exercises Provided and Patient Education Provided     Education provided:   - Posture, gait    Written Home Exercises  Provided: Patient instructed to cont prior HEP.  Exercises were reviewed and Laura was able to demonstrate them prior to the end of the session.  Laura demonstrated fair  understanding of the education provided.     See EMR under Patient Instructions for exercises provided prior visit.    Assessment   Patient reported reduced pain with taping last visit. Participated in gentle strength training with joint protection techniques. Exhibits minimal edema in right knee at patella pole. Received lateral retinaculum stretch followed by joint mobilizations. Received medial patella taping to allow for improved placement. Reported reduced pain with taping. Provided compression stocking and educated patient to avoid allowing it to roll as well as to not wear at night.   Laura is progressing well towards her goals.   Pt prognosis is Good.     Pt will continue to benefit from skilled outpatient physical therapy to address the deficits listed in the problem list box on initial evaluation, provide pt/family education and to maximize pt's level of independence in the home and community environment.     Pt's spiritual, cultural and educational needs considered and pt agreeable to plan of care and goals.     Anticipated barriers to physical therapy: chronic pain  Goals:   Short Term Goals: 2 weeks   1. Patient will be independent with HEP  2. Patient will ambulate with postural stability and normal postural alignment  3. Bilateral hip and knee strength to improve by 1/2 MM grade     Long Term Goals: 6 weeks   1. Bilateral hip and knee strength to improve by 1 grade globally  2. Patient will report pain reduction by 20% while at work  3. FOTO will improve by 10 points    Plan     Progressive strengthening of bilateral hips and knees as well as neuromuscular reeducation to allow for adequate joint loading with reduced pain     Abrahan Valera, PT

## 2020-09-01 ENCOUNTER — OFFICE VISIT (OUTPATIENT)
Dept: PSYCHIATRY | Facility: CLINIC | Age: 50
End: 2020-09-01
Payer: COMMERCIAL

## 2020-09-01 ENCOUNTER — CLINICAL SUPPORT (OUTPATIENT)
Dept: REHABILITATION | Facility: HOSPITAL | Age: 50
End: 2020-09-01
Payer: COMMERCIAL

## 2020-09-01 DIAGNOSIS — G89.29 CHRONIC PAIN OF BOTH KNEES: ICD-10-CM

## 2020-09-01 DIAGNOSIS — F33.1 MODERATE EPISODE OF RECURRENT MAJOR DEPRESSIVE DISORDER: ICD-10-CM

## 2020-09-01 DIAGNOSIS — M25.562 CHRONIC PAIN OF BOTH KNEES: ICD-10-CM

## 2020-09-01 DIAGNOSIS — F90.0 ATTENTION DEFICIT HYPERACTIVITY DISORDER (ADHD), PREDOMINANTLY INATTENTIVE TYPE: ICD-10-CM

## 2020-09-01 DIAGNOSIS — M25.561 CHRONIC PAIN OF BOTH KNEES: ICD-10-CM

## 2020-09-01 DIAGNOSIS — R29.898 WEAKNESS OF BOTH HIPS: ICD-10-CM

## 2020-09-01 DIAGNOSIS — R26.9 GAIT ABNORMALITY: ICD-10-CM

## 2020-09-01 DIAGNOSIS — M25.361 INSTABILITY OF RIGHT KNEE JOINT: ICD-10-CM

## 2020-09-01 DIAGNOSIS — F41.0 GENERALIZED ANXIETY DISORDER WITH PANIC ATTACKS: Primary | ICD-10-CM

## 2020-09-01 DIAGNOSIS — F41.1 GENERALIZED ANXIETY DISORDER WITH PANIC ATTACKS: Primary | ICD-10-CM

## 2020-09-01 PROCEDURE — 97014 ELECTRIC STIMULATION THERAPY: CPT

## 2020-09-01 PROCEDURE — 99214 PR OFFICE/OUTPT VISIT, EST, LEVL IV, 30-39 MIN: ICD-10-PCS | Mod: 95,,, | Performed by: PSYCHIATRY & NEUROLOGY

## 2020-09-01 PROCEDURE — 99214 OFFICE O/P EST MOD 30 MIN: CPT | Mod: 95,,, | Performed by: PSYCHIATRY & NEUROLOGY

## 2020-09-01 PROCEDURE — 97110 THERAPEUTIC EXERCISES: CPT

## 2020-09-01 RX ORDER — DEXTROAMPHETAMINE SACCHARATE, AMPHETAMINE ASPARTATE MONOHYDRATE, DEXTROAMPHETAMINE SULFATE AND AMPHETAMINE SULFATE 2.5; 2.5; 2.5; 2.5 MG/1; MG/1; MG/1; MG/1
10 CAPSULE, EXTENDED RELEASE ORAL EVERY MORNING
Qty: 15 CAPSULE | Refills: 0 | Status: SHIPPED | OUTPATIENT
Start: 2020-09-01 | End: 2020-09-17 | Stop reason: SDUPTHER

## 2020-09-01 RX ORDER — DULOXETIN HYDROCHLORIDE 30 MG/1
30 CAPSULE, DELAYED RELEASE ORAL DAILY
Qty: 30 CAPSULE | Refills: 11 | Status: SHIPPED | OUTPATIENT
Start: 2020-09-01 | End: 2021-03-08

## 2020-09-01 NOTE — PROGRESS NOTES
Outpatient Psychiatry Follow-Up Visit (MD/NP)    9/1/2020      Clinical Status of Patient:  Outpatient (Ambulatory)    Chief Complaint:  Laura Vora is a 50 y.o. female who presents today for follow-up of depression, anxiety and attention problems.  Met with patient.      Interval History and Content of Current Session:  Interim Events/Subjective Report/Content of Current Session:     She feels her anxiety is more elevated she has been dealing   She finds very anxious   She does find that she is more agitated   She finds that she is taking Seroquel at 50 mg  No previous diagnosis of ADHD she has 4 kids 7 grand kids, 4 kids with ADHD and 4 grand kids had ADHD  She has struggled with focus and concentration     Adult ADHD Self-Report Scale 9/1/2020   How often do you have trouble wrapping up the final details of a project once the chanllenging parts have been done? 3   How often do you have difficulty getting things in order when you have to do a task that requires organization? 3   How often do you have problems remembering appointments or obligations? 4   When you have a task that requires a lot of thought, how often do you avoid or delay getting started? 4   How often do you fidget or squirm with your hands or feet when you have to sit down for a long time? 4   How often do you feel overly active and compelled to do things, like you were driven by a motor? 4   Part A Score 22   How often do you make careless mistakes when you have to work on a boring or difficult project? 2   How often do you have difficulty keeping your attention when you are doing boring or repetitive work? 4   How often do you have difficulty concentrating on what people say to you, even when they are speaking to you directly? 4   How often do you misplace or have difficulty finding things at home or at work? 3   How often are you distracted by activity or noise around you? 3   How often do you leave your seat in meetings or other  situations in which you are expected to remain seated? 3   How often do you feel restless or fidgety? 4   How often do you have difficulty unwinding and relaxing when you have time to yourself? 4   How often do you find yourself talking too much when you are in social situations? 4   When you're in a conversation, how often do you find yourself finishing the sentences of the people you are talking to before they can finish them themselves? 4   How often do you have difficulty waiting your turn in situations when turn taking is required? 2   How often do you interrupt others when they are busy? 3   Part B Score 40     GAD7 9/1/2020 7/31/2020   1. Feeling nervous, anxious, or on edge? 3 3   2. Not being able to stop or control worrying? 2 3   3. Worrying too much about different things? 2 3   4. Trouble relaxing? 3 3   5. Being so restless that it is hard to sit still? 2 3   6. Becoming easily annoyed or irritable? 3 3   7. Feeling afraid as if something awful might happen? 2 3   CAM-7 Score 17 21     Little interest or pleasure in doing things: (P) More than half the days  Feeling down, depressed, or hopeless: (P) Nearly every day  Trouble falling or staying asleep, or sleeping too much: (P) Not at all  Feeling tired or having little energy: (P) Not at all  Poor appetite or overeating: (P) Nearly every day  Feeling bad about yourself - or that you are a failure or have let yourself or your family down: (P) Not at all  Trouble concentrating on things, such as reading the newspaper or watching television: (P) More than half the days  Moving or speaking so slowly that other people could have noticed. Or the opposite - being so fidgety or restless that you have been moving around a lot more than usual: (P) Not at all  PHQ-9 Total Score: (P) 10      No suicidal ideation   No homicidal ideation    Review of Systems   · PSYCHIATRIC: Pertinant items are noted in the narrative.    Past Medical, Family and Social History: The  "patient's past medical, family and social history have been reviewed and updated as appropriate within the electronic medical record - see encounter notes.    Compliance: yes    Side effects: None    Risk Parameters:  Patient reports no suicidal ideation  Patient reports no homicidal ideation  Patient reports no self-injurious behavior  Patient reports no violent behavior    Exam (detailed: at least 9 elements; comprehensive: all 15 elements)   Constitutional  Vitals:  Most recent vital signs, dated less than 90 days prior to this appointment, were reviewed.   Last 3 sets of Vitals    Vitals - 1 value per visit 11/23/2018 11/24/2018 11/15/2019   SYSTOLIC - 133 142   DIASTOLIC - 82 88   PULSE - 82 72   TEMPERATURE - 98.2 -   RESPIRATIONS - 20 -   SPO2 - 97 -   Weight (lb) 321.87 - 321   Weight (kg) 146 - 145.605   HEIGHT 5' 6" - 5' 6"   BODY MASS INDEX 51.95 - 51.81   VISIT REPORT - - -   Pain Score  - - 8          General:  unremarkable, age appropriate     Musculoskeletal  Muscle Strength/Tone:  not examined   Gait & Station:  non-ataxic     Psychiatric  Speech:  no latency; no press   Mood & Affect:  steady, anxious  congruent and appropriate, anxious   Thought Process:  normal and logical   Associations:  intact   Thought Content:  suicidal thoughts: (active-no, passive-no), homicidal thoughts: (active-no, passive-no)   Insight:  has awareness of illness   Judgement: behavior is adequate to circumstances   Orientation:  grossly intact   Memory: intact for content of interview   Language: grossly intact   Attention Span & Concentration:  able to focus   Fund of Knowledge:  intact and appropriate to age and level of education     Assessment and Diagnosis   Status/Progress: Based on the examination today, the patient's problem(s) is/are improved.  New problems have been presented today.   Co-morbidities are complicating management of the primary condition.  There are no active rule-out diagnoses for this patient at " this time.     General Impression:     Encounter Diagnoses   Name Primary?    Generalized anxiety disorder with panic attacks Yes    Moderate episode of recurrent major depressive disorder     Attention deficit hyperactivity disorder (ADHD), predominantly inattentive type          Intervention/Counseling/Treatment Plan   · Medication Management: The risks and benefits of medication were discussed with the patient.     -cymbalta at 30 mg (decrease)  -Addrall xr 10 mg   Return to Clinic: 2 weeks

## 2020-09-01 NOTE — PROGRESS NOTES
Physical Therapy Treatment Note     Name: Laura Garcia [facility] Number: [id number]    Therapy Diagnosis:   Encounter Diagnoses   Name Primary?    Chronic pain of both knees     Gait abnormality     Weakness of both hips     Instability of right knee joint      Physician: Edgar Hickey MD    Visit Date: 9/1/2020    Physician Orders: PT Eval and Treat   Medical Diagnosis from Referral: Bilateral knee pain  Evaluation Date: 7/24/2020  Authorization Period Expiration: 10/24/2020  Plan of Care Expiration: 10/24/2020  Visit # / Visits authorized: 5/ 12    Time In: 7:00  Time Out: 7:50  Total Billable Time: 40 minutes    Precautions: Standard    Subjective     Pt reports: pain has reduced minimally. Reports more soreness than pain.   She was compliant with home exercise program.  Response to previous treatment: reduced pain  Functional change: improved gait     Pain: 5/10  Location: bilateral knee      Objective     Larua received therapeutic exercises to develop strength, flexibility and posture for 40 minutes including:  NU step x 8 min for endurance and ROM  Total gym 5 bands 4 min  Prone TKE 10 x 10 sec  4 way SLR  Standing gastroc stretch on wedge      Laura received the following supervised modalities after being cleared for contradictions: IFC Electrical Stimulation:  Laura received IFC Electrical Stimulation for pain control applied to the bilateral knee. Pt received stimulation at 10 % scan at a frequency of 150 for 10 minutes. Laura tolderated treatment well without any adverse effects.      Laura received hot pack for 10 minutes to right knee.      Home Exercises Provided and Patient Education Provided     Education provided:   - Posture, gait    Written Home Exercises Provided: Patient instructed to cont prior HEP.  Exercises were reviewed and Laura was able to demonstrate them prior to the end of the session.  Laura demonstrated fair  understanding of the education provided.     See EMR under Patient  Instructions for exercises provided prior visit.    Assessment   Patient reported reduced pain with taping last visit. Participated in gentle strength training with joint protection techniques. Instructed patient upon the importance of regular exercise to facilitate pain free sttength training and flexibility. Patient to return to MD next week.   Laura is progressing well towards her goals.   Pt prognosis is Good.     Pt will continue to benefit from skilled outpatient physical therapy to address the deficits listed in the problem list box on initial evaluation, provide pt/family education and to maximize pt's level of independence in the home and community environment.     Pt's spiritual, cultural and educational needs considered and pt agreeable to plan of care and goals.     Anticipated barriers to physical therapy: chronic pain  Goals:   Short Term Goals: 2 weeks   1. Patient will be independent with HEP  2. Patient will ambulate with postural stability and normal postural alignment  3. Bilateral hip and knee strength to improve by 1/2 MM grade     Long Term Goals: 6 weeks   1. Bilateral hip and knee strength to improve by 1 grade globally  2. Patient will report pain reduction by 20% while at work  3. FOTO will improve by 10 points    Plan     Progressive strengthening of bilateral hips and knees as well as neuromuscular reeducation to allow for adequate joint loading with reduced pain     Abrahan Valera, PT

## 2020-09-01 NOTE — PATIENT INSTRUCTIONS
Attention Deficit/Hyperactivity Disorder (ADHD, ADD) in Adults  Youve always had trouble concentrating. Your mind wanders, and its hard to finish tasks. As a result, you didnt do well in school. And now, you often struggle with your job. Sometimes this makes you bar or depressed. These may be symptoms of attention deficit  hyperactivity disorder (ADHD) or may still be referred to as attention deficit disorder (ADD). To find out more, talk to your healthcare provider. He or she can offer guidance and support.  Symptoms  of ADD in adults  For an adult to be diagnosed with ADHD, the symptoms must have been present since childhood. The symptoms may include:  · Trouble thinking things through  · Low self-esteem  · Depression  · Trouble holding a job  · Memory problems  · Problems with a marriage or relationship  · Lack of discipline   What is ADHD?  Attention deficit hyperactivity disorder makes it hard to focus your mind. You may daydream a lot. And you may be restless much of the time. As a result, you may have trouble with detailed or boring work. And it may be hard to stick with one project for very long. You also may forget things. Or, you may miss key points during a lecture or meeting. You may even have trouble sitting through a movie or concert. At times, you may feel frustrated or angry. This can affect your relationships with others.  Who does it affect?  ADHD starts in childhood. Sometimes, your symptoms may improve as you get older. But they also may persist into your adult years. ADHD is often thought of as a kids problem. Thats why its often missed in adults. In fact, many parents learn they have ADHD when their children are diagnosed.  What causes it?  The exact cause of ADHD isnt known. The disorder does run in families. Having one parent with ADHD makes it more likely youll have it too. And the part of your brain that controls attention may be involved. Certain brain chemicals that are out  of balance may also play a role.  What can be done?  The first step is finding out if you really have ADHD. Your doctor will use special guidelines to diagnose the disorder. Most adults with ADHD are greatly helped by therapy and coaching. In some cases, your doctor may also prescribe medicine to ease your symptoms.  Resources  · National Resource Center on AD/HDwww.dvxw7kifn.org  · Attention Deficit Disorder Associationwww.add.org   Date Last Reviewed: 1/1/2017 © 2000-2017 [employer]. [address] [place] [address]. All rights reserved. This information is not intended as a substitute for professional medical care. Always follow your healthcare professional's instructions.          Amphetamine; Dextroamphetamine extended-release capsules  What is this medicine?  AMPHETAMINE; DEXTROAMPHETAMINE (am FET a meen; dex troe am FET a meen) is used to treat attention-deficit hyperactivity disorder (ADHD). Federal law prohibits giving this medicine to any person other than the person for whom it was prescribed. Do not share this medicine with anyone else.  How should I use this medicine?  Take this medicine by mouth with a glass of water. Follow the directions on the prescription label. This medicine is taken just one time per day, usually in the morning after waking up. Take with or without food. Do not chew or crush this medicine. You may open the capsules and sprinkle the medicine on a spoonful of applesauce. If sprinkled on applesauce, take the dose immediately and do not crush or chew. Always drink a glass of water or other liquid after taking this medicine. Do not take your medicine more often than directed.  A special MedGuide will be given to you by the pharmacist with each prescription and refill. Be sure to read this information carefully each time.  Talk to your pediatrician regarding the use of this medicine in children. While this drug may be prescribed for children as young as 6 years  for selected conditions, precautions do apply.  What side effects may I notice from receiving this medicine?  Side effects that you should report to your doctor or health care professional as soon as possible:  · allergic reactions like skin rash, itching or hives, swelling of the face, lips, or tongue  · changes in vision  · chest pain or chest tightness  · confusion, trouble speaking or understanding  · fast, irregular heartbeat  · fingers or toes feel numb, cool, painful  · hallucination, loss of contact with reality  · high blood pressure  · males: prolonged or painful erection  · seizures  · severe headaches  · shortness of breath  · suicidal thoughts or other mood changes  · trouble walking, dizziness, loss of balance or coordination  · uncontrollable head, mouth, neck, arm, or leg movements  Side effects that usually do not require medical attention (report to your doctor or health care professional if they continue or are bothersome):  · anxious  · headache  · loss of appetite  · nausea, vomiting  · trouble sleeping  · weight loss  What may interact with this medicine?  Do not take this medicine with any of the following medications:  · MAOIs like Carbex, Eldepryl, Marplan, Nardil, and Parnate  · other stimulant medicines for attention disorders, weight loss, or to stay awake  This medicine may also interact with the following medications:  · acetazolamide  · ammonium chloride  · antacids  · ascorbic acid  · atomoxetine  · caffeine  · certain medicines for blood pressure  · certain medicines for depression, anxiety, or psychotic disturbances  · certain medicines for diabetes  · certain medicines for seizures like carbamazepine, phenobarbital, phenytoin  · certain medicines for stomach problems like cimetidine, famotidine, omeprazole, lansoprazole  · cold or allergy medicines  · glutamic acid  · lithium  · meperidine  · methenamine; sodium acid phosphate  · narcotic medicines for  pain  · norepinephrine  · phenothiazines like chlorpromazine, mesoridazine, prochlorperazine, thioridazine  · sodium bicarbonate  What if I miss a dose?  If you miss a dose, take it as soon as you can. If it is almost time for your next dose, take only that dose. Do not take double or extra doses.  Where should I keep my medicine?  Keep out of the reach of children. This medicine can be abused. Keep your medicine in a safe place to protect it from theft. Do not share this medicine with anyone. Selling or giving away this medicine is dangerous and against the law.  Store at room temperature between 15 and 30 degrees C (59 and 86 degrees F). Keep container tightly closed. Protect from light. Throw away any unused medicine after the expiration date.  What should I tell my health care provider before I take this medicine?  They need to know if you have any of these conditions:  · anxiety or panic attacks  · circulation problems in fingers and toes  · glaucoma  · hardening or blockages of the arteries or heart blood vessels  · heart disease or a heart defect  · high blood pressure  · history of a drug or alcohol abuse problem  · history of stroke  · kidney disease  · liver disease  · mental illness  · seizures  · suicidal thoughts, plans, or attempt; a previous suicide attempt by you or a family member  · thyroid disease  · Tourette's syndrome  · an unusual or allergic reaction to dextroamphetamine, other amphetamines, other medicines, foods, dyes, or preservatives  · pregnant or trying to get pregnant  · breast-feeding  What should I watch for while using this medicine?  Visit your doctor or health care professional for regular checks on your progress. This prescription requires that you follow special procedures with your doctor and pharmacy. You will need to have a new written prescription from your doctor every time you need a refill.  This medicine may affect your concentration, or hide signs of tiredness. Until you  know how this medicine affects you, do not drive, ride a bicycle, use machinery, or do anything that needs mental alertness.  Tell your doctor or health care professional if this medicine loses its effects, or if you feel you need to take more than the prescribed amount. Do not change the dosage without talking to your doctor or health care professional.  Decreased appetite is a common side effect when starting this medicine. Eating small, frequent meals or snacks can help. Talk to your doctor if you continue to have poor eating habits. Height and weight growth of a child taking this medicine will be monitored closely.  Do not take this medicine close to bedtime. It may prevent you from sleeping.  If you are going to need surgery, an MRI, a CT scan, or other procedure, tell your doctor that you are taking this medicine. You may need to stop taking this medicine before the procedure.  Tell your doctor or healthcare professional right away if you notice unexplained wounds on your fingers and toes while taking this medicine. You should also tell your healthcare provider if you experience numbness or pain, changes in the skin color, or sensitivity to temperature in your fingers or toes.  NOTE:This sheet is a summary. It may not cover all possible information. If you have questions about this medicine, talk to your doctor, pharmacist, or health care provider. Copyright© 2017 [employer]

## 2020-09-03 ENCOUNTER — OFFICE VISIT (OUTPATIENT)
Dept: INTERNAL MEDICINE | Facility: CLINIC | Age: 50
End: 2020-09-03
Payer: COMMERCIAL

## 2020-09-03 ENCOUNTER — CLINICAL SUPPORT (OUTPATIENT)
Dept: REHABILITATION | Facility: HOSPITAL | Age: 50
End: 2020-09-03
Payer: COMMERCIAL

## 2020-09-03 VITALS
WEIGHT: 293 LBS | SYSTOLIC BLOOD PRESSURE: 110 MMHG | RESPIRATION RATE: 18 BRPM | OXYGEN SATURATION: 96 % | HEIGHT: 66 IN | TEMPERATURE: 99 F | HEART RATE: 93 BPM | DIASTOLIC BLOOD PRESSURE: 84 MMHG | BODY MASS INDEX: 47.09 KG/M2

## 2020-09-03 DIAGNOSIS — M25.361 INSTABILITY OF RIGHT KNEE JOINT: ICD-10-CM

## 2020-09-03 DIAGNOSIS — I10 ESSENTIAL HYPERTENSION: ICD-10-CM

## 2020-09-03 DIAGNOSIS — M25.561 CHRONIC PAIN OF BOTH KNEES: ICD-10-CM

## 2020-09-03 DIAGNOSIS — J30.9 ALLERGIC RHINITIS, UNSPECIFIED SEASONALITY, UNSPECIFIED TRIGGER: ICD-10-CM

## 2020-09-03 DIAGNOSIS — M25.562 CHRONIC PAIN OF BOTH KNEES: ICD-10-CM

## 2020-09-03 DIAGNOSIS — R29.898 WEAKNESS OF BOTH HIPS: ICD-10-CM

## 2020-09-03 DIAGNOSIS — R26.9 GAIT ABNORMALITY: ICD-10-CM

## 2020-09-03 DIAGNOSIS — H65.02 NON-RECURRENT ACUTE SEROUS OTITIS MEDIA OF LEFT EAR: Primary | ICD-10-CM

## 2020-09-03 DIAGNOSIS — G89.29 CHRONIC PAIN OF BOTH KNEES: ICD-10-CM

## 2020-09-03 PROCEDURE — 3008F BODY MASS INDEX DOCD: CPT | Mod: CPTII,S$GLB,, | Performed by: PHYSICIAN ASSISTANT

## 2020-09-03 PROCEDURE — 3008F PR BODY MASS INDEX (BMI) DOCUMENTED: ICD-10-PCS | Mod: CPTII,S$GLB,, | Performed by: PHYSICIAN ASSISTANT

## 2020-09-03 PROCEDURE — 99999 PR PBB SHADOW E&M-EST. PATIENT-LVL III: ICD-10-PCS | Mod: PBBFAC,,, | Performed by: PHYSICIAN ASSISTANT

## 2020-09-03 PROCEDURE — 99214 OFFICE O/P EST MOD 30 MIN: CPT | Mod: S$GLB,,, | Performed by: PHYSICIAN ASSISTANT

## 2020-09-03 PROCEDURE — 97110 THERAPEUTIC EXERCISES: CPT

## 2020-09-03 PROCEDURE — 3079F PR MOST RECENT DIASTOLIC BLOOD PRESSURE 80-89 MM HG: ICD-10-PCS | Mod: CPTII,S$GLB,, | Performed by: PHYSICIAN ASSISTANT

## 2020-09-03 PROCEDURE — 3074F SYST BP LT 130 MM HG: CPT | Mod: CPTII,S$GLB,, | Performed by: PHYSICIAN ASSISTANT

## 2020-09-03 PROCEDURE — 97014 ELECTRIC STIMULATION THERAPY: CPT

## 2020-09-03 PROCEDURE — 99999 PR PBB SHADOW E&M-EST. PATIENT-LVL III: CPT | Mod: PBBFAC,,, | Performed by: PHYSICIAN ASSISTANT

## 2020-09-03 PROCEDURE — 3079F DIAST BP 80-89 MM HG: CPT | Mod: CPTII,S$GLB,, | Performed by: PHYSICIAN ASSISTANT

## 2020-09-03 PROCEDURE — 99214 PR OFFICE/OUTPT VISIT, EST, LEVL IV, 30-39 MIN: ICD-10-PCS | Mod: S$GLB,,, | Performed by: PHYSICIAN ASSISTANT

## 2020-09-03 PROCEDURE — 3074F PR MOST RECENT SYSTOLIC BLOOD PRESSURE < 130 MM HG: ICD-10-PCS | Mod: CPTII,S$GLB,, | Performed by: PHYSICIAN ASSISTANT

## 2020-09-03 RX ORDER — AMOXICILLIN 500 MG/1
500 TABLET, FILM COATED ORAL EVERY 12 HOURS
Qty: 14 TABLET | Refills: 0 | Status: CANCELLED | OUTPATIENT
Start: 2020-09-03 | End: 2020-09-10

## 2020-09-03 RX ORDER — AMOXICILLIN AND CLAVULANATE POTASSIUM 875; 125 MG/1; MG/1
1 TABLET, FILM COATED ORAL 2 TIMES DAILY
Qty: 14 TABLET | Refills: 0 | Status: SHIPPED | OUTPATIENT
Start: 2020-09-03 | End: 2020-09-10

## 2020-09-03 RX ORDER — CETIRIZINE HYDROCHLORIDE 10 MG/1
10 TABLET ORAL DAILY
Qty: 30 TABLET | Refills: 1 | Status: SHIPPED | OUTPATIENT
Start: 2020-09-03 | End: 2022-10-07

## 2020-09-03 NOTE — PROGRESS NOTES
"Subjective:      Patient ID: Laura Vora is a 50 y.o. female.    Chief Complaint: Otalgia (Left)    Patient is new to me, being seen today for ear pain.     Otalgia   There is pain in the left ear. This is a new problem. The current episode started today. Pertinent negatives include no abdominal pain, coughing, diarrhea, headaches, rash, rhinorrhea, sore throat or vomiting. She has tried nothing for the symptoms.     Review of Systems   Constitutional: Negative for chills, diaphoresis and fever.   HENT: Positive for ear pain (L). Negative for congestion, rhinorrhea and sore throat.         L neck/glands sore   Respiratory: Negative for cough, shortness of breath and wheezing.    Gastrointestinal: Negative for abdominal pain, constipation, diarrhea, nausea and vomiting.   Skin: Negative for rash.   Neurological: Negative for dizziness, light-headedness and headaches.       Objective:   /84 (BP Location: Right arm, Patient Position: Sitting, BP Method: Large (Manual))   Pulse 93   Temp 99.1 °F (37.3 °C) (Tympanic)   Resp 18   Ht 5' 6" (1.676 m)   Wt (!) 145.9 kg (321 lb 10.4 oz)   LMP 09/05/2014 (Exact Date)   SpO2 96%   BMI 51.92 kg/m²   Physical Exam  Constitutional:       General: She is not in acute distress.     Appearance: She is well-developed. She is not ill-appearing or diaphoretic.   HENT:      Head: Normocephalic and atraumatic.      Right Ear: Tympanic membrane and ear canal normal. No middle ear effusion. Tympanic membrane is not erythematous.      Left Ear: Ear canal normal. A middle ear effusion (serous) is present. Tympanic membrane is erythematous.      Nose: Mucosal edema present.      Right Sinus: No maxillary sinus tenderness or frontal sinus tenderness.      Left Sinus: No maxillary sinus tenderness or frontal sinus tenderness.   Cardiovascular:      Rate and Rhythm: Normal rate and regular rhythm.      Heart sounds: Normal heart sounds. No murmur.   Pulmonary:      Effort: " Pulmonary effort is normal. No respiratory distress.      Breath sounds: Normal breath sounds. No decreased breath sounds, wheezing, rhonchi or rales.   Lymphadenopathy:      Cervical: No cervical adenopathy.   Skin:     General: Skin is warm and dry.      Findings: No rash.   Psychiatric:         Speech: Speech normal.         Behavior: Behavior normal.         Thought Content: Thought content normal.       Assessment:      1. Non-recurrent acute serous otitis media of left ear    2. Allergic rhinitis, unspecified seasonality, unspecified trigger    3. Essential hypertension       Plan:   Non-recurrent acute serous otitis media of left ear  -     amoxicillin-clavulanate 875-125mg (AUGMENTIN) 875-125 mg per tablet; Take 1 tablet by mouth 2 (two) times daily. for 7 days  Dispense: 14 tablet; Refill: 0    Allergic rhinitis, unspecified seasonality, unspecified trigger  -     cetirizine (ZYRTEC) 10 MG tablet; Take 1 tablet (10 mg total) by mouth once daily.  Dispense: 30 tablet; Refill: 1    Essential hypertension   Controlled on current regimen     Take antibiotics for entire course.  Consider yogurt or probiotic while on antibiotic to prevent GI upset.    Do not save medications for later, all medications must be taken for full regimen.    Discussed worsening signs/symptoms and when to return to clinic or go to ED.   Patient expresses understanding and agrees with treatment plan.

## 2020-09-03 NOTE — PROGRESS NOTES
Physical Therapy Treatment Note     Name: Laura Garcia [facility] Number: [id number]    Therapy Diagnosis:   Encounter Diagnoses   Name Primary?    Chronic pain of both knees     Gait abnormality     Weakness of both hips     Instability of right knee joint      Physician: Edgar Hickey MD    Visit Date: 9/3/2020    Physician Orders: PT Eval and Treat   Medical Diagnosis from Referral: Bilateral knee pain  Evaluation Date: 7/24/2020  Authorization Period Expiration: 10/24/2020  Plan of Care Expiration: 10/24/2020  Visit # / Visits authorized: 6/ 12    Time In: 6:30  Time Out: 7:15  Total Billable Time: 40 minutes    Precautions: Standard    Subjective     Pt reports: returning to MD today  She was compliant with home exercise program.  Response to previous treatment: reduced pain  Functional change: improved gait     Pain: 5/10  Location: bilateral knee      Objective     Laura received therapeutic exercises to develop strength, flexibility and posture for 30 minutes including:  NU step x 8 min for endurance and ROM  Prone TKE 10 x 10 sec  4 way SLR        Laura received the following supervised modalities after being cleared for contradictions: IFC Electrical Stimulation:  Laura received IFC Electrical Stimulation for pain control applied to the bilateral knee. Pt received stimulation at 10 % scan at a frequency of 150 for 10 minutes. Laura tolderated treatment well without any adverse effects.      Laura received hot pack for 10 minutes to right knee.      Home Exercises Provided and Patient Education Provided     Education provided:   - Posture, gait    Written Home Exercises Provided: Patient instructed to cont prior HEP.  Exercises were reviewed and Laura was able to demonstrate them prior to the end of the session.  Laura demonstrated fair  understanding of the education provided.     See EMR under Patient Instructions for exercises provided prior visit.    Assessment   Patient reported pain continues to  fluctuate without significant improvements. Patient to return to MD today to determine next course of action. Patient to call to notify of plan for continuing PT. Progress remains limited.  Pt prognosis is limited    Pt will continue to benefit from skilled outpatient physical therapy to address the deficits listed in the problem list box on initial evaluation, provide pt/family education and to maximize pt's level of independence in the home and community environment.     Pt's spiritual, cultural and educational needs considered and pt agreeable to plan of care and goals.     Anticipated barriers to physical therapy: chronic pain  Goals:   Short Term Goals: 2 weeks   1. Patient will be independent with HEP  2. Patient will ambulate with postural stability and normal postural alignment  3. Bilateral hip and knee strength to improve by 1/2 MM grade     Long Term Goals: 6 weeks   1. Bilateral hip and knee strength to improve by 1 grade globally  2. Patient will report pain reduction by 20% while at work  3. FOTO will improve by 10 points    Plan     Progressive strengthening of bilateral hips and knees as well as neuromuscular reeducation to allow for adequate joint loading with reduced pain     Abrahan Valera, PT

## 2020-09-17 RX ORDER — DEXTROAMPHETAMINE SACCHARATE, AMPHETAMINE ASPARTATE MONOHYDRATE, DEXTROAMPHETAMINE SULFATE AND AMPHETAMINE SULFATE 2.5; 2.5; 2.5; 2.5 MG/1; MG/1; MG/1; MG/1
10 CAPSULE, EXTENDED RELEASE ORAL EVERY MORNING
Qty: 15 CAPSULE | Refills: 0 | Status: SHIPPED | OUTPATIENT
Start: 2020-09-17 | End: 2020-11-17

## 2020-10-14 ENCOUNTER — PATIENT MESSAGE (OUTPATIENT)
Dept: PSYCHIATRY | Facility: CLINIC | Age: 50
End: 2020-10-14

## 2020-10-20 ENCOUNTER — TELEPHONE (OUTPATIENT)
Dept: INTERNAL MEDICINE | Facility: CLINIC | Age: 50
End: 2020-10-20

## 2020-10-21 ENCOUNTER — HOSPITAL ENCOUNTER (OUTPATIENT)
Dept: RADIOLOGY | Facility: HOSPITAL | Age: 50
Discharge: HOME | End: 2020-10-21
Attending: FAMILY MEDICINE
Payer: COMMERCIAL

## 2020-10-21 VITALS — HEIGHT: 66 IN | BODY MASS INDEX: 47.09 KG/M2 | WEIGHT: 293 LBS

## 2020-10-21 DIAGNOSIS — Z12.31 SCREENING MAMMOGRAM, ENCOUNTER FOR: Primary | ICD-10-CM

## 2020-10-21 DIAGNOSIS — Z12.31 SCREENING MAMMOGRAM, ENCOUNTER FOR: ICD-10-CM

## 2020-10-21 PROCEDURE — 77063 MAMMO DIGITAL SCREENING BILAT WITH TOMO: ICD-10-PCS | Mod: 26,,, | Performed by: RADIOLOGY

## 2020-10-21 PROCEDURE — 77067 SCR MAMMO BI INCL CAD: CPT | Mod: TC

## 2020-10-21 PROCEDURE — 77067 SCR MAMMO BI INCL CAD: CPT | Mod: 26,,, | Performed by: RADIOLOGY

## 2020-10-21 PROCEDURE — 77067 MAMMO DIGITAL SCREENING BILAT WITH TOMO: ICD-10-PCS | Mod: 26,,, | Performed by: RADIOLOGY

## 2020-10-21 PROCEDURE — 77063 BREAST TOMOSYNTHESIS BI: CPT | Mod: 26,,, | Performed by: RADIOLOGY

## 2020-10-29 DIAGNOSIS — S83.249A MMT (MEDIAL MENISCUS TEAR): Primary | ICD-10-CM

## 2020-11-09 ENCOUNTER — PATIENT MESSAGE (OUTPATIENT)
Dept: REHABILITATION | Facility: HOSPITAL | Age: 50
End: 2020-11-09

## 2020-11-10 ENCOUNTER — CLINICAL SUPPORT (OUTPATIENT)
Dept: REHABILITATION | Facility: HOSPITAL | Age: 50
End: 2020-11-10
Payer: COMMERCIAL

## 2020-11-10 DIAGNOSIS — S83.242D TEAR OF MEDIAL MENISCUS OF LEFT KNEE, UNSPECIFIED TEAR TYPE, UNSPECIFIED WHETHER OLD OR CURRENT TEAR, SUBSEQUENT ENCOUNTER: ICD-10-CM

## 2020-11-10 DIAGNOSIS — R29.898 WEAKNESS OF BOTH HIPS: ICD-10-CM

## 2020-11-10 DIAGNOSIS — M25.562 PAIN IN LATERAL PORTION OF LEFT KNEE: ICD-10-CM

## 2020-11-10 DIAGNOSIS — R26.9 GAIT ABNORMALITY: ICD-10-CM

## 2020-11-10 DIAGNOSIS — G89.29 CHRONIC PAIN OF BOTH KNEES: ICD-10-CM

## 2020-11-10 DIAGNOSIS — M25.361 INSTABILITY OF RIGHT KNEE JOINT: ICD-10-CM

## 2020-11-10 DIAGNOSIS — M25.561 CHRONIC PAIN OF BOTH KNEES: ICD-10-CM

## 2020-11-10 DIAGNOSIS — R26.9 GAIT DIFFICULTY: ICD-10-CM

## 2020-11-10 DIAGNOSIS — S83.249A MMT (MEDIAL MENISCUS TEAR): ICD-10-CM

## 2020-11-10 DIAGNOSIS — M25.562 CHRONIC PAIN OF BOTH KNEES: ICD-10-CM

## 2020-11-10 PROCEDURE — 97110 THERAPEUTIC EXERCISES: CPT

## 2020-11-10 PROCEDURE — 97162 PT EVAL MOD COMPLEX 30 MIN: CPT

## 2020-11-10 PROCEDURE — 97116 GAIT TRAINING THERAPY: CPT

## 2020-11-10 NOTE — PLAN OF CARE
[facility]  Physical Therapy Initial Evaluation    Name: Laura Vora  [facility] Number: [id number]    Therapy Diagnosis:   Encounter Diagnoses   Name Primary?    MMT (medial meniscus tear)     Chronic pain of both knees     Gait abnormality     Weakness of both hips     Instability of right knee joint     Pain in lateral portion of left knee     Gait difficulty     Tear of medial meniscus of left knee, unspecified tear type, unspecified whether old or current tear, subsequent encounter      Physician: Edgar Hickey MD    Physician Orders: PT Eval and Treat   Medical Diagnosis from Referral: S83.249A (ICD-10-CM) - MMT (medial meniscus tear)  Evaluation Date: 11/10/2020  Authorization Period Expiration: 12/31/2020  Plan of Care Expiration: 1/19/2020  Visit # / Visits authorized: 1/20    Time In: 7:45 am  Time Out: 8:30 am  Total Billable Time: 30 minutes    Precautions: Standard, HTN, Asthma    Subjective   Date of onset: nov or Dec of 2019  History of current condition - Laura reports: Pt was attempting to get out of the car and heard a pop in her knee and fell back into her seat. She tried conservative PT but ended up with Sx last Thursday to repair the meniscus.     Pain:  Current 8/10, worst 10/10, best 6/10   Location: left knee   Description: Aching  Aggravating Factors: Standing, Walking, Night Time, Morning, Extension, Flexing, Lifting and Getting out of bed/chair  Easing Factors: pain medication and rest    Prior Therapy: PT for the knee  Social History: lives home alone with 5 steps and one hand rail  Occupation: medical assistant  Prior Level of Function: pt was ambulatory for community distances and performed home and yard maintanance  Current Level of Function: unable to tolerate home and yard maintanence, limited in dressing and cooking and standing and community mobility    Imaging, MRI studies    Medical History:   Past Medical History:   Diagnosis Date     Bronchitis     Diverticulitis     HTN (hypertension)     Obese     Tobacco use        Surgical History:   Laura Vora  has a past surgical history that includes Cholecystectomy; Appendectomy; and Hysterectomy.    Medications:   Laura has a current medication list which includes the following prescription(s): albuterol, alprazolam, amlodipine, cetirizine, dextroamphetamine-amphetamine, duloxetine, flovent hfa, hydrocodone-acetaminophen, nicotine, omeprazole, quetiapine, and tramadol.    Allergies:   Review of patient's allergies indicates:   Allergen Reactions    Iodine and iodide containing products Hives    Bactrim [sulfamethoxazole-trimethoprim] Hives        Pts goals: pt would like to return to usual work and home dutires    Objective       CMS Impairment/Limitation/Restriction for FOTO knee Survey    Therapist reviewed FOTO scores for Laura Vora on 11/10/2020.   FOTO documents entered into [employer] - see Media section.    Limitation Score: 45%       Gait: antalgic with crutch on the L side only with the foot with decreased stance time and increase supination/inversion and increased lateral heel weight bearing and poor transition to mid foot and great toe for push off. Decreased step length on the R. Needs to use both crutches. Not safe on curb to enter building    Balance: R LE single leg stand= unable, L LE single leg stand = unable, not able to squat or kneel      Sensation: Sensation intact to light touch B LE's except at surgery site      Knee AROM:     (R) (L)     Flexion   120 90     Extension  0 10     Extension (sitting) 0 5     Hip AROM:  Flexion   Limited by soft tissue B     ER   45 40     IR   20 15     ABD   20 15  Ankle AROM:  Ankle DF(traci) 10 5         Strength:     R L  Hip flexors L2   4/5 3/5  Quadriceps L3   4/5 3/5     Hamstrings S1  4/5 3/5    Dorsiflexion L4  4/5 4/5      Plantar flexion S1  2/5 2/5    Hip Internal Rotation  3+/5 2+/5    Hip External  Rotation  3+/5 2+/5    Gluteus Medius L45S1 3+/5 2/5    Gluteus Manan L5S12 3+/5 2/5      Joint Mobility: limited tibial internal rotation on the L with good patellar mobility and mildly limited talar/calcaneal motion    Muscle Length: Pt presents with limited tension in the hip flexors and quads B.      Palpation: weak firing L VMO, mild edema in popliteal fossa    TREATMENT   Treatment Time In: 8:00 am  Treatment Time Out: 8:30 am  Total Treatment time separate from Evaluation: 30 minutes    Laura received therapeutic exercises to develop strength, endurance, ROM and flexibility for 22 minutes including:  Quad sets L 2x10  LAQ seated with 3 sec hold 2x10 L  Supine march B 2x10  Heel slides L 2x10  SLR B 2x10  Prone hamstring curls 2x10 L    Laura participated in gait training to improve functional mobility and safety for 8  minutes, including:  PT adjusted pt crutch to proper height, educated her in ways to reduce strain of the nerves in the axillary region and educated her and worked with her on step management at the curb to reduce fall hazards.    Home Exercises and Patient Education Provided    Education provided:   -Education on condition, HEP, and see gait.     Written Home Exercises Provided: Patient instructed to cont prior HEP.  Exercises were reviewed and Laura was able to demonstrate them prior to the end of the session.  Laura demonstrated good  understanding of the education provided.     See EMR under Patient Instructions for exercises provided 11/10/2020.    Assessment   Laura is a 50 y.o. female referred to outpatient Physical Therapy with a medical diagnosis of S83.249A (ICD-10-CM) - MMT (medial meniscus tear). The patient presents with signs and symptoms consistent with diagnosis along with gait difficulty, R knee pain and impairments which include decreased strength, decreased joint mobility, joint hypermobility , decreased muscle length, impaired coordination, impaired balance, postural  abnormalities, gait abnormalities and decreased overall function.  These impairments are limiting patient's ability to ambulate on level and unlevel kendrick, manage home and work tasks as well as simple dressing.     Pt prognosis is Good.   Pt will benefit from skilled outpatient Physical Therapy to address the deficits stated above and in the chart below, provide pt/family education, and to maximize pt's level of independence.     Plan of care discussed with patient: Yes  Pt's spiritual, cultural and educational needs considered and patient is agreeable to the plan of care and goals as stated below:     Anticipated Barriers for therapy: work schedule is busy, limited finances    Medical Necessity is demonstrated by the following  History  Co-morbidities and personal factors that may impact the plan of care Co-morbidities:   See medical history  HTN, Asthma, finances  Personal Factors:   coping style  lifestyle     moderate   Examination  Body Structures and Functions, activity limitations and participation restrictions that may impact the plan of care Body Regions:   back  lower extremities    Body Systems:    gross symmetry  ROM  strength  gross coordinated movement  balance  gait  transfers  transitions    Participation Restrictions:   See current restrictions    Activity limitations:   Learning and applying knowledge  watching  listening    General Tasks and Commands  undertaking a single task  undertaking multiple tasks    Communication  communicating with/receiving spoken language  communicating with/receiving non-verbal language    Mobility  lifting and carrying objects  walking  using transportation (bus, train, plane, car)  driving (bike, car, motorcycle)    Self care  washing oneself (bathing, drying, washing hands)  caring for body parts (brushing teeth, shaving, grooming)  toileting  dressing  looking after one's health    Domestic Life  shopping  cooking  doing house work (cleaning house, washing  dishes, laundry)  assisting others    Interactions/Relationships  basic interpersonal interactions  complex interpersonal interactions    Life Areas  employment  basic economic transactions    Community and Social Life  community life  recreation and leisure         high   Clinical Presentation evolving clinical presentation with changing clinical characteristics moderate   Decision Making/ Complexity Score: moderate     Goals:  Short Term Goals: In 4 weeks 12/8/2020:  1.Pt to be educated on HEP.  2.Patient to demo increased AROM to 110 degrees or better with knee flexion  3.Patient to increase strength in L knee extension and flexion by 1/2 grade.  4.Patient to have decreased pain to 6/10 or better.  5.Patient to increase LE balance to tolerate walking on level kendrick without an AD.  6.Patient to improve score on the FOTO by 5%.      Long Term Goals: In 10 weeks 1/19/2020  1. Patient to perform daily activities including climbing up to 2 flights of steps without limitation.  2. Patient to demonstrate increased knee AROM to 120 degrees or better.  3. Patient to demonstrate increased LE strength to 4+/5 with knee extension and flexion.  4. Patient to have decreased pain to 3/10 or better.  5. Patient to improve score on the FOTO to 30% limitation or better.      Plan   Plan of care Certification: 11/10/2020 to 1/19/2020.    Outpatient Physical Therapy 2 times weekly for 10 weeks to include the following interventions: Electrical Stimulation IFC, NMES, Russian, Gait Training, Manual Therapy, Moist Heat/ Ice, Neuromuscular Re-ed, Patient Education, Self Care, Therapeutic Activites and Therapeutic Exercise.     Liliane Meyers, PT, CIDN, SFMA    Thank you for this referral.    These services are reasonable and necessary for the conditions set forth above while under my care.

## 2020-11-12 ENCOUNTER — CLINICAL SUPPORT (OUTPATIENT)
Dept: REHABILITATION | Facility: HOSPITAL | Age: 50
End: 2020-11-12
Payer: COMMERCIAL

## 2020-11-12 DIAGNOSIS — R26.9 GAIT DIFFICULTY: ICD-10-CM

## 2020-11-12 DIAGNOSIS — M25.562 PAIN IN LATERAL PORTION OF LEFT KNEE: ICD-10-CM

## 2020-11-12 PROCEDURE — 97110 THERAPEUTIC EXERCISES: CPT

## 2020-11-12 NOTE — PROGRESS NOTES
Physical Therapy Daily Treatment Note     Name: Laura Garcia [facility] Number: [id number]    Therapy Diagnosis:   Encounter Diagnoses   Name Primary?    Pain in lateral portion of left knee     Gait difficulty      Physician: Edgar Hickey MD    Visit Date: 11/12/2020    Physician Orders: PT Eval and Treat   Medical Diagnosis from Referral: S83.249A (ICD-10-CM) - MMT (medial meniscus tear)  Evaluation Date: 11/10/2020  Authorization Period Expiration: 12/31/2020  Plan of Care Expiration: 1/19/2020  Visit # / Visits authorized: 2/20     Time In: 7:45 am  Time Out: 8:30 am  Total Billable Time: 45 minutes     Precautions: Standard, HTN, Asthma     Foto Due date: 5th visit  Progress Note Due Date:12/8/2020    SUBJECTIVE     Today, pt reports: she is less stiff and feels she isn't gonna fall using her crutches today as she is much more steady.  She was compliant with home exercise program.  Response to previous treatment: less stiff  Functional change: walking better on crutches    Pre-Treatment Pain: 6/10  Post-Treatment Pain: 7/10  Location: L knee pain  TREATMENT     Laura received therapeutic exercises to develop strength, endurance, ROM and flexibility for 45 minutes including:    SLR B 2x10  Prone hamstring curls 2x10 B  Side lying clams 2x10 B RTB (red theraband)  Bridges 2x10 B  Seated hamstring curls red theraband L 3x10  Seated LAQ 3 sec hold at top 3x10 L  L heel slides with sheet 3x10  L hip abduction in supine with sheet 2x10         Laura received the following manual therapy techniques: Joint mobilizations, Myofacial release and Soft tissue Mobilization were applied to the: - for - minutes      Laura participated in neuromuscular re-education activities to improve: Balance, Coordination, Sense, Proprioception and Posture for - minutes. The following activities were included:    Laura received the following supervised modalities after being cleared for contradictions:     Laura received hot pack for -  minutes to -.    Laura received cold pack for - minutes to -.  Laura received electrical stimulation for - minutes to -      Home Exercises Provided and Patient Education Provided     Education/Self-Care provided: (during therex)   Patient educated on biomechanical justification for therapeutic exercise and importance of compliance with HEP in order to improve overall impairments and QOL    Patient educated on postural awareness and the use of a lumbar roll when in a seated position to reduce stress and maintain optimal alignment of the spine.    Patient educated on proper ergonomics at the work station in order to maintain optimal alignment of the musculoskeletal system and improve efficiency in the work environment.   Patient educated on the importance of improved core and upper and lower extremity strength in order to improve alignment of the spine and upper and lower extremities with static positions and dynamic movement.    Patient educated on the importance of strong core and lower extremity musculature in order to improve both static and dynamic balance, improve gait mechanics, reduce fall risk and improve household and community mobility.       Written Home Exercises Provided: Patient instructed to cont prior HEP.  Exercises were reviewed and Laura was able to demonstrate them prior to the end of the session.  Laura demonstrated good  understanding of the education provided.     See EMR under Patient Instructions for exercises provided prior visit.    ASSESSMENT   Pt tolerated therex with fatigue on all hip exercises and needed cues from PT to engage proper muscle recruitment. Pt is very deconditioned but demonstrated improved gait safety today.  Pt tolerated exercise well with reports of increased fatigue but no increased pain. Pt demonstrated good understanding of exercises and required minimal cueing to maintain proper form.      Laura is progressing well towards her goals.   Pt prognosis is Good.     Pt  will continue to benefit from skilled outpatient physical therapy to address the deficits listed in the problem list box on initial evaluation, provide pt/family education and to maximize pt's level of independence in the home and community environment.     Pt's spiritual, cultural and educational needs considered and pt agreeable to plan of care and goals.     Anticipated barriers to physical therapy: work schedule    Goals:   Short Term Goals: In 4 weeks 12/8/2020:  1.Pt to be educated on HEP.  2.Patient to demo increased AROM to 110 degrees or better with knee flexion  3.Patient to increase strength in L knee extension and flexion by 1/2 grade.  4.Patient to have decreased pain to 6/10 or better.  5.Patient to increase LE balance to tolerate walking on level kendrick without an AD.  6.Patient to improve score on the FOTO by 5%.        Long Term Goals: In 10 weeks 1/19/2020  1. Patient to perform daily activities including climbing up to 2 flights of steps without limitation.  2. Patient to demonstrate increased knee AROM to 120 degrees or better.  3. Patient to demonstrate increased LE strength to 4+/5 with knee extension and flexion.  4. Patient to have decreased pain to 3/10 or better.  5. Patient to improve score on the FOTO to 30% limitation or better.        Plan   Plan of care Certification: 11/10/2020 to 1/19/2020.     Outpatient Physical Therapy 2 times weekly for 10 weeks (effective week of 11/10/2020) to include the following interventions: Electrical Stimulation IFC, NMES, Russian, Gait Training, Manual Therapy, Moist Heat/ Ice, Neuromuscular Re-ed, Patient Education, Self Care, Therapeutic Activites and Therapeutic Exercise.          PLAN   Continue Plan of Care (POC) and progress per patient tolerance.    Liliane Meyers, PT, CIDN, SFMA

## 2020-11-17 ENCOUNTER — OFFICE VISIT (OUTPATIENT)
Dept: PSYCHIATRY | Facility: CLINIC | Age: 50
End: 2020-11-17
Payer: COMMERCIAL

## 2020-11-17 ENCOUNTER — PATIENT MESSAGE (OUTPATIENT)
Dept: PSYCHIATRY | Facility: CLINIC | Age: 50
End: 2020-11-17

## 2020-11-17 DIAGNOSIS — F41.1 GENERALIZED ANXIETY DISORDER WITH PANIC ATTACKS: Primary | ICD-10-CM

## 2020-11-17 DIAGNOSIS — F41.0 GENERALIZED ANXIETY DISORDER WITH PANIC ATTACKS: Primary | ICD-10-CM

## 2020-11-17 DIAGNOSIS — F90.0 ATTENTION DEFICIT HYPERACTIVITY DISORDER (ADHD), PREDOMINANTLY INATTENTIVE TYPE: ICD-10-CM

## 2020-11-17 PROCEDURE — 99214 OFFICE O/P EST MOD 30 MIN: CPT | Mod: 95,,, | Performed by: PSYCHIATRY & NEUROLOGY

## 2020-11-17 PROCEDURE — 99214 PR OFFICE/OUTPT VISIT, EST, LEVL IV, 30-39 MIN: ICD-10-PCS | Mod: 95,,, | Performed by: PSYCHIATRY & NEUROLOGY

## 2020-11-17 RX ORDER — DEXTROAMPHETAMINE SACCHARATE, AMPHETAMINE ASPARTATE MONOHYDRATE, DEXTROAMPHETAMINE SULFATE AND AMPHETAMINE SULFATE 5; 5; 5; 5 MG/1; MG/1; MG/1; MG/1
20 CAPSULE, EXTENDED RELEASE ORAL EVERY MORNING
Qty: 30 CAPSULE | Refills: 0 | Status: SHIPPED | OUTPATIENT
Start: 2020-12-17 | End: 2020-12-17 | Stop reason: SDUPTHER

## 2020-11-17 RX ORDER — DEXTROAMPHETAMINE SACCHARATE, AMPHETAMINE ASPARTATE MONOHYDRATE, DEXTROAMPHETAMINE SULFATE AND AMPHETAMINE SULFATE 5; 5; 5; 5 MG/1; MG/1; MG/1; MG/1
20 CAPSULE, EXTENDED RELEASE ORAL EVERY MORNING
Qty: 30 CAPSULE | Refills: 0 | Status: SHIPPED | OUTPATIENT
Start: 2020-11-17 | End: 2020-11-17 | Stop reason: SDUPTHER

## 2020-11-17 RX ORDER — ALPRAZOLAM 0.5 MG/1
0.5 TABLET ORAL 2 TIMES DAILY PRN
Qty: 60 TABLET | Refills: 4 | Status: SHIPPED | OUTPATIENT
Start: 2020-11-17 | End: 2020-11-17 | Stop reason: SDUPTHER

## 2020-11-17 RX ORDER — DEXTROAMPHETAMINE SACCHARATE, AMPHETAMINE ASPARTATE MONOHYDRATE, DEXTROAMPHETAMINE SULFATE AND AMPHETAMINE SULFATE 5; 5; 5; 5 MG/1; MG/1; MG/1; MG/1
20 CAPSULE, EXTENDED RELEASE ORAL EVERY MORNING
Qty: 30 CAPSULE | Refills: 0 | Status: SHIPPED | OUTPATIENT
Start: 2020-11-17 | End: 2020-12-17 | Stop reason: SDUPTHER

## 2020-11-17 RX ORDER — DEXTROAMPHETAMINE SACCHARATE, AMPHETAMINE ASPARTATE MONOHYDRATE, DEXTROAMPHETAMINE SULFATE AND AMPHETAMINE SULFATE 5; 5; 5; 5 MG/1; MG/1; MG/1; MG/1
20 CAPSULE, EXTENDED RELEASE ORAL EVERY MORNING
Qty: 30 CAPSULE | Refills: 0 | Status: SHIPPED | OUTPATIENT
Start: 2021-01-16 | End: 2020-12-17 | Stop reason: SDUPTHER

## 2020-11-17 RX ORDER — ALPRAZOLAM 0.5 MG/1
0.5 TABLET ORAL 2 TIMES DAILY PRN
Qty: 60 TABLET | Refills: 4 | Status: SHIPPED | OUTPATIENT
Start: 2020-11-17 | End: 2021-03-08

## 2020-11-17 NOTE — TELEPHONE ENCOUNTER
"The patient is requesting that both medication orders be sent in to the [facility] [address]. Patient stated that " [facility] didn't have my Ad[facility]all and I work at the [address] location.   "

## 2020-11-17 NOTE — PROGRESS NOTES
Outpatient Psychiatry Follow-Up Visit (MD/NP)    11/17/2020      Clinical Status of Patient:  Outpatient (Ambulatory)    Chief Complaint:  Laura Vora is a 50 y.o. female who presents today for follow-up of depression, anxiety and attention problems.  Met with patient.      Interval History and Content of Current Session:  Interim Events/Subjective Report/Content of Current Session:     Doing better since the adderall  No negative side-effects  Future oriented  Feels that she does better when she takes 20 mg   No suicidal ideation  Anxiety for the most part under control  Has run out of script will need another refill    No changes in CV symptoms (eg, fainting or dizziness, seizures, chest pain or shortness of breath with exercise, unexplained change in exercise tolerance, or palpitations).     GAD7 11/17/2020 10/12/2020 9/1/2020   1. Feeling nervous, anxious, or on edge? 2 0 3   2. Not being able to stop or control worrying? 0 0 2   3. Worrying too much about different things? 0 0 2   4. Trouble relaxing? 0 2 3   5. Being so restless that it is hard to sit still? 0 2 2   6. Becoming easily annoyed or irritable? 1 2 3   7. Feeling afraid as if something awful might happen? 0 0 2   CAM-7 Score 3 6 17     Little interest or pleasure in doing things: (P) More than half the days  Feeling down, depressed, or hopeless: (P) Not at all  Trouble falling or staying asleep, or sleeping too much: (P) Not at all  Feeling tired or having little energy: (P) Not at all  Poor appetite or overeating: (P) Not at all  Feeling bad about yourself - or that you are a failure or have let yourself or your family down: (P) Not at all  Trouble concentrating on things, such as reading the newspaper or watching television: (P) More than half the days  Moving or speaking so slowly that other people could have noticed. Or the opposite - being so fidgety or restless that you have been moving around a lot more than usual: (P) Not at all  PHQ-9  Total Score: (P) 4    Adult ADHD Self-Report Scale 9/1/2020   How often do you have trouble wrapping up the final details of a project once the chanllenging parts have been done? 3   How often do you have difficulty getting things in order when you have to do a task that requires organization? 3   How often do you have problems remembering appointments or obligations? 4   When you have a task that requires a lot of thought, how often do you avoid or delay getting started? 4   How often do you fidget or squirm with your hands or feet when you have to sit down for a long time? 4   How often do you feel overly active and compelled to do things, like you were driven by a motor? 4   Part A Score 22   How often do you make careless mistakes when you have to work on a boring or difficult project? 2   How often do you have difficulty keeping your attention when you are doing boring or repetitive work? 4   How often do you have difficulty concentrating on what people say to you, even when they are speaking to you directly? 4   How often do you misplace or have difficulty finding things at home or at work? 3   How often are you distracted by activity or noise around you? 3   How often do you leave your seat in meetings or other situations in which you are expected to remain seated? 3   How often do you feel restless or fidgety? 4   How often do you have difficulty unwinding and relaxing when you have time to yourself? 4   How often do you find yourself talking too much when you are in social situations? 4   When you're in a conversation, how often do you find yourself finishing the sentences of the people you are talking to before they can finish them themselves? 4   How often do you have difficulty waiting your turn in situations when turn taking is required? 2   How often do you interrupt others when they are busy? 3   Part B Score 40     GAD7 11/17/2020 10/12/2020 9/1/2020   1. Feeling nervous, anxious, or on edge? 2 0 3    2. Not being able to stop or control worrying? 0 0 2   3. Worrying too much about different things? 0 0 2   4. Trouble relaxing? 0 2 3   5. Being so restless that it is hard to sit still? 0 2 2   6. Becoming easily annoyed or irritable? 1 2 3   7. Feeling afraid as if something awful might happen? 0 0 2   CAM-7 Score 3 6 17     Little interest or pleasure in doing things: (P) More than half the days  Feeling down, depressed, or hopeless: (P) Not at all  Trouble falling or staying asleep, or sleeping too much: (P) Not at all  Feeling tired or having little energy: (P) Not at all  Poor appetite or overeating: (P) Not at all  Feeling bad about yourself - or that you are a failure or have let yourself or your family down: (P) Not at all  Trouble concentrating on things, such as reading the newspaper or watching television: (P) More than half the days  Moving or speaking so slowly that other people could have noticed. Or the opposite - being so fidgety or restless that you have been moving around a lot more than usual: (P) Not at all  PHQ-9 Total Score: (P) 4      No suicidal ideation   No homicidal ideation    Review of Systems   · PSYCHIATRIC: Pertinant items are noted in the narrative.    Past Medical, Family and Social History: The patient's past medical, family and social history have been reviewed and updated as appropriate within the electronic medical record - see encounter notes.    Compliance: yes    Side effects: None    Risk Parameters:  Patient reports no suicidal ideation  Patient reports no homicidal ideation  Patient reports no self-injurious behavior  Patient reports no violent behavior    Exam (detailed: at least 9 elements; comprehensive: all 15 elements)   Constitutional  Vitals:  Most recent vital signs, dated less than 90 days prior to this appointment, were reviewed.   Last 3 sets of Vitals    Vitals - 1 value per visit 11/15/2019 9/3/2020 10/21/2020   SYSTOLIC 142 110 -   DIASTOLIC 88 84 -  "  PULSE 72 93 -   TEMPERATURE - 99.1 -   RESPIRATIONS - 18 -   SPO2 - 96 -   Weight (lb) 321 321.65 321.65   Weight (kg) 145.605 145.9 145.9   HEIGHT 5' 6" 5' 6" 5' 6"   BODY MASS INDEX 51.81 51.92 51.92   VISIT REPORT - - -   Pain Score  8 8 -          General:  unremarkable, age appropriate     Musculoskeletal  Muscle Strength/Tone:  not examined   Gait & Station:  non-ataxic     Psychiatric  Speech:  no latency; no press   Mood & Affect:  steady  congruent and appropriate   Thought Process:  normal and logical   Associations:  intact   Thought Content:  suicidal thoughts: (active-no, passive-no), homicidal thoughts: (active-no, passive-no)   Insight:  has awareness of illness   Judgement: behavior is adequate to circumstances   Orientation:  grossly intact   Memory: intact for content of interview   Language: grossly intact   Attention Span & Concentration:  able to focus   Fund of Knowledge:  intact and appropriate to age and level of education     Assessment and Diagnosis   Status/Progress: Based on the examination today, the patient's problem(s) is/are improved.  New problems have not been presented today.   Co-morbidities are not  complicating management of the primary condition.  There are no active rule-out diagnoses for this patient at this time.     General Impression:     Encounter Diagnoses   Name Primary?    Generalized anxiety disorder with panic attacks Yes    Attention deficit hyperactivity disorder (ADHD), predominantly inattentive type          Intervention/Counseling/Treatment Plan   · Medication Management: The risks and benefits of medication were discussed with the patient.     -cymbalta at 30 mg (decrease)  -Addrall xr 10 mg   Return to Clinic: 2 weeks    "

## 2020-11-18 ENCOUNTER — CLINICAL SUPPORT (OUTPATIENT)
Dept: REHABILITATION | Facility: HOSPITAL | Age: 50
End: 2020-11-18
Payer: COMMERCIAL

## 2020-11-18 DIAGNOSIS — R26.9 GAIT DIFFICULTY: ICD-10-CM

## 2020-11-18 DIAGNOSIS — M25.562 PAIN IN LATERAL PORTION OF LEFT KNEE: ICD-10-CM

## 2020-11-18 PROCEDURE — 97110 THERAPEUTIC EXERCISES: CPT

## 2020-11-18 NOTE — PLAN OF CARE
Physical Therapy Daily Treatment Note and Frequency Update     Name: Laura Garcia [facility] Number: [id number]    Therapy Diagnosis:   Encounter Diagnoses   Name Primary?    Pain in lateral portion of left knee     Gait difficulty      Physician: Edgar Hickey MD    Visit Date: 11/18/2020    Physician Orders: PT Eval and Treat   Medical Diagnosis from Referral: S83.249A (ICD-10-CM) - MMT (medial meniscus tear)  Evaluation Date: 11/10/2020  Authorization Period Expiration: 12/31/2020  Plan of Care Expiration: 1/19/2020  Visit # / Visits authorized: 3/20     Time In: 3:30 pm  Time Out: 4:25 pm  Total Billable Time: 55 minutes     Precautions: Standard, HTN, Asthma     Foto Due date: 5th visit  Progress Note Due Date:12/8/2020    SUBJECTIVE     Today, pt reports: Her Doctor wants her to attend 3 x a week but she is very limited with work and requested just 2x a week but now she wants to try to upgrade due to Dr suggestion after she spoke with him the other day. Pt states she is less sore and walking better.     She was compliant with home exercise program.  Response to previous treatment: less stiff  Functional change: walking better on crutches    Pre-Treatment Pain: 7/10  Post-Treatment Pain: 2/10  Location: L knee pain  TREATMENT     Laura received therapeutic exercises to develop strength, endurance, ROM and flexibility for 55 minutes including:    Nustep 6 minutes   SLR B 2x10 B  Prone hamstring curls 3x10 B 2#  L heel slides 2# 2x10  L hip abduction in supine with sheet 2x10 2#  Side lying clams 3x10 B RTB 2#  Seated hamstring curls red theraband L 3x10  Supine SAQ 2# 3x10 L  Seated LAQ 3 sec hold at top 3x10 2# L    Deferred:   Bridges 2x10      Laura received the following manual therapy techniques: Joint mobilizations, Myofacial release and Soft tissue Mobilization were applied to the: - for - minutes      Laura participated in neuromuscular re-education activities to improve: Balance, Coordination, Sense,  Proprioception and Posture for - minutes. The following activities were included:    Laura received the following supervised modalities after being cleared for contradictions:     Laura received hot pack for - minutes to -.    Laura received cold pack for - minutes to -.  Laura received electrical stimulation for - minutes to -      Home Exercises Provided and Patient Education Provided     Education/Self-Care provided: (during therex)   Patient educated on biomechanical justification for therapeutic exercise and importance of compliance with HEP in order to improve overall impairments and QOL    Patient educated on postural awareness and the use of a lumbar roll when in a seated position to reduce stress and maintain optimal alignment of the spine.    Patient educated on proper ergonomics at the work station in order to maintain optimal alignment of the musculoskeletal system and improve efficiency in the work environment.   Patient educated on the importance of improved core and upper and lower extremity strength in order to improve alignment of the spine and upper and lower extremities with static positions and dynamic movement.    Patient educated on the importance of strong core and lower extremity musculature in order to improve both static and dynamic balance, improve gait mechanics, reduce fall risk and improve household and community mobility.       Written Home Exercises Provided: Patient instructed to cont prior HEP.  Exercises were reviewed and Laura was able to demonstrate them prior to the end of the session.  Laura demonstrated good  understanding of the education provided.     See EMR under Patient Instructions for exercises provided prior visit.    ASSESSMENT   Pt tolerated therex with good effort. PT worked with pt on increasing VMO strength and improving quad and hamstring quality to reduce edema and improve functional gait. PT to add some weight bearing tasks in a few days to help reduce crutch  use over time but slowly. PT will upgrade frequency at Dr request (per pt).  Motion was very smooth today on 90% of therex showing good functional gains in ROM. Gait was much safer than the last few visits. Pt tolerated exercise well with reports of increased fatigue but no increased pain. Pt demonstrated good understanding of exercises and required minimal cueing to maintain proper form.      Laura is progressing well towards her goals.   Pt prognosis is Good.     Pt will continue to benefit from skilled outpatient physical therapy to address the deficits listed in the problem list box on initial evaluation, provide pt/family education and to maximize pt's level of independence in the home and community environment.     Pt's spiritual, cultural and educational needs considered and pt agreeable to plan of care and goals.     Anticipated barriers to physical therapy: work schedule    Goals:   Short Term Goals: In 4 weeks 12/8/2020:  1.Pt to be educated on HEP.  2.Patient to demo increased AROM to 110 degrees or better with knee flexion. Progressing as smoother transition today.  3.Patient to increase strength in L knee extension and flexion by 1/2 grade.  4.Patient to have decreased pain to 6/10 or better.  5.Patient to increase LE balance to tolerate walking on level kendrick without an AD.  6.Patient to improve score on the FOTO by 5%.        Long Term Goals: In 10 weeks 1/19/2020  1. Patient to perform daily activities including climbing up to 2 flights of steps without limitation.  2. Patient to demonstrate increased knee AROM to 120 degrees or better.  3. Patient to demonstrate increased LE strength to 4+/5 with knee extension and flexion.  4. Patient to have decreased pain to 3/10 or better.  5. Patient to improve score on the FOTO to 30% limitation or better.        Plan   Plan of care Certification: 11/10/2020 to 1/19/2020.     Outpatient Physical Therapy 2-3 times weekly for 10 weeks (effective week of  11/10/2020) to include the following interventions: Electrical Stimulation IFC, NMES, Russian, Gait Training, Manual Therapy, Moist Heat/ Ice, Neuromuscular Re-ed, Patient Education, Self Care, Therapeutic Activites and Therapeutic Exercise.       Continue Plan of Care (POC) and progress per patient tolerance.    Liliane Meyers PT, CIDN, SFMA

## 2020-11-18 NOTE — PROGRESS NOTES
Physical Therapy Daily Treatment Note and Frequency Update     Name: Laura Garcia [facility] Number: [id number]    Therapy Diagnosis:   Encounter Diagnoses   Name Primary?    Pain in lateral portion of left knee     Gait difficulty      Physician: Edgar Hickey MD    Visit Date: 11/18/2020    Physician Orders: PT Eval and Treat   Medical Diagnosis from Referral: S83.249A (ICD-10-CM) - MMT (medial meniscus tear)  Evaluation Date: 11/10/2020  Authorization Period Expiration: 12/31/2020  Plan of Care Expiration: 1/19/2020  Visit # / Visits authorized: 3/20     Time In: 3:30 pm  Time Out: 4:25 pm  Total Billable Time: 55 minutes     Precautions: Standard, HTN, Asthma     Foto Due date: 5th visit  Progress Note Due Date:12/8/2020    SUBJECTIVE     Today, pt reports: Her Doctor wants her to attend 3 x a week but she is very limited with work and requested just 2x a week but now she wants to try to upgrade due to Dr suggestion after she spoke with him the other day. Pt states she is less sore and walking better.     She was compliant with home exercise program.  Response to previous treatment: less stiff  Functional change: walking better on crutches    Pre-Treatment Pain: 7/10  Post-Treatment Pain: 2/10  Location: L knee pain  TREATMENT     Laura received therapeutic exercises to develop strength, endurance, ROM and flexibility for 55 minutes including:    Nustep 6 minutes   SLR B 2x10 B  Prone hamstring curls 3x10 B 2#  L heel slides 2# 2x10  L hip abduction in supine with sheet 2x10 2#  Side lying clams 3x10 B RTB 2#  Seated hamstring curls red theraband L 3x10  Supine SAQ 2# 3x10 L  Seated LAQ 3 sec hold at top 3x10 2# L    Deferred:   Bridges 2x10      Laura received the following manual therapy techniques: Joint mobilizations, Myofacial release and Soft tissue Mobilization were applied to the: - for - minutes      Laura participated in neuromuscular re-education activities to improve: Balance, Coordination, Sense,  Proprioception and Posture for - minutes. The following activities were included:    Laura received the following supervised modalities after being cleared for contradictions:     Laura received hot pack for - minutes to -.    Laura received cold pack for - minutes to -.  Laura received electrical stimulation for - minutes to -      Home Exercises Provided and Patient Education Provided     Education/Self-Care provided: (during therex)   Patient educated on biomechanical justification for therapeutic exercise and importance of compliance with HEP in order to improve overall impairments and QOL    Patient educated on postural awareness and the use of a lumbar roll when in a seated position to reduce stress and maintain optimal alignment of the spine.    Patient educated on proper ergonomics at the work station in order to maintain optimal alignment of the musculoskeletal system and improve efficiency in the work environment.   Patient educated on the importance of improved core and upper and lower extremity strength in order to improve alignment of the spine and upper and lower extremities with static positions and dynamic movement.    Patient educated on the importance of strong core and lower extremity musculature in order to improve both static and dynamic balance, improve gait mechanics, reduce fall risk and improve household and community mobility.       Written Home Exercises Provided: Patient instructed to cont prior HEP.  Exercises were reviewed and Laura was able to demonstrate them prior to the end of the session.  Laura demonstrated good  understanding of the education provided.     See EMR under Patient Instructions for exercises provided prior visit.    ASSESSMENT   Pt tolerated therex with good effort. PT worked with pt on increasing VMO strength and improving quad and hamstring quality to reduce edema and improve functional gait. PT to add some weight bearing tasks in a few days to help reduce crutch  use over time but slowly. PT will upgrade frequency at Dr request (per pt).  Motion was very smooth today on 90% of therex showing good functional gains in ROM. Gait was much safer than the last few visits. Pt tolerated exercise well with reports of increased fatigue but no increased pain. Pt demonstrated good understanding of exercises and required minimal cueing to maintain proper form.      Laura is progressing well towards her goals.   Pt prognosis is Good.     Pt will continue to benefit from skilled outpatient physical therapy to address the deficits listed in the problem list box on initial evaluation, provide pt/family education and to maximize pt's level of independence in the home and community environment.     Pt's spiritual, cultural and educational needs considered and pt agreeable to plan of care and goals.     Anticipated barriers to physical therapy: work schedule    Goals:   Short Term Goals: In 4 weeks 12/8/2020:  1.Pt to be educated on HEP.  2.Patient to demo increased AROM to 110 degrees or better with knee flexion. Progressing as smoother transition today.  3.Patient to increase strength in L knee extension and flexion by 1/2 grade.  4.Patient to have decreased pain to 6/10 or better.  5.Patient to increase LE balance to tolerate walking on level kendrick without an AD.  6.Patient to improve score on the FOTO by 5%.        Long Term Goals: In 10 weeks 1/19/2020  1. Patient to perform daily activities including climbing up to 2 flights of steps without limitation.  2. Patient to demonstrate increased knee AROM to 120 degrees or better.  3. Patient to demonstrate increased LE strength to 4+/5 with knee extension and flexion.  4. Patient to have decreased pain to 3/10 or better.  5. Patient to improve score on the FOTO to 30% limitation or better.        Plan   Plan of care Certification: 11/10/2020 to 1/19/2020.     Outpatient Physical Therapy 2-3 times weekly for 10 weeks (effective week of  11/10/2020) to include the following interventions: Electrical Stimulation IFC, NMES, Russian, Gait Training, Manual Therapy, Moist Heat/ Ice, Neuromuscular Re-ed, Patient Education, Self Care, Therapeutic Activites and Therapeutic Exercise.       Continue Plan of Care (POC) and progress per patient tolerance.    Liliane Meyers PT, CIDN, SFMA

## 2020-11-23 ENCOUNTER — OFFICE VISIT (OUTPATIENT)
Dept: INTERNAL MEDICINE | Facility: CLINIC | Age: 50
End: 2020-11-23
Payer: COMMERCIAL

## 2020-11-23 ENCOUNTER — PATIENT MESSAGE (OUTPATIENT)
Dept: REHABILITATION | Facility: HOSPITAL | Age: 50
End: 2020-11-23

## 2020-11-23 VITALS
BODY MASS INDEX: 47.09 KG/M2 | HEIGHT: 66 IN | DIASTOLIC BLOOD PRESSURE: 90 MMHG | TEMPERATURE: 98 F | OXYGEN SATURATION: 99 % | SYSTOLIC BLOOD PRESSURE: 124 MMHG | WEIGHT: 293 LBS | HEART RATE: 112 BPM

## 2020-11-23 DIAGNOSIS — I10 ESSENTIAL HYPERTENSION: ICD-10-CM

## 2020-11-23 DIAGNOSIS — H65.02 ACUTE SEROUS OTITIS MEDIA OF LEFT EAR, RECURRENCE NOT SPECIFIED: Primary | ICD-10-CM

## 2020-11-23 DIAGNOSIS — Z72.0 TOBACCO ABUSE: ICD-10-CM

## 2020-11-23 PROCEDURE — 99214 OFFICE O/P EST MOD 30 MIN: CPT | Mod: S$GLB,,, | Performed by: FAMILY MEDICINE

## 2020-11-23 PROCEDURE — 3008F BODY MASS INDEX DOCD: CPT | Mod: CPTII,S$GLB,, | Performed by: FAMILY MEDICINE

## 2020-11-23 PROCEDURE — 3080F DIAST BP >= 90 MM HG: CPT | Mod: CPTII,S$GLB,, | Performed by: FAMILY MEDICINE

## 2020-11-23 PROCEDURE — 3074F SYST BP LT 130 MM HG: CPT | Mod: CPTII,S$GLB,, | Performed by: FAMILY MEDICINE

## 2020-11-23 PROCEDURE — 99214 PR OFFICE/OUTPT VISIT, EST, LEVL IV, 30-39 MIN: ICD-10-PCS | Mod: S$GLB,,, | Performed by: FAMILY MEDICINE

## 2020-11-23 PROCEDURE — 99999 PR PBB SHADOW E&M-EST. PATIENT-LVL IV: ICD-10-PCS | Mod: PBBFAC,,, | Performed by: FAMILY MEDICINE

## 2020-11-23 PROCEDURE — 3074F PR MOST RECENT SYSTOLIC BLOOD PRESSURE < 130 MM HG: ICD-10-PCS | Mod: CPTII,S$GLB,, | Performed by: FAMILY MEDICINE

## 2020-11-23 PROCEDURE — 1125F AMNT PAIN NOTED PAIN PRSNT: CPT | Mod: S$GLB,,, | Performed by: FAMILY MEDICINE

## 2020-11-23 PROCEDURE — 99999 PR PBB SHADOW E&M-EST. PATIENT-LVL IV: CPT | Mod: PBBFAC,,, | Performed by: FAMILY MEDICINE

## 2020-11-23 PROCEDURE — 3080F PR MOST RECENT DIASTOLIC BLOOD PRESSURE >= 90 MM HG: ICD-10-PCS | Mod: CPTII,S$GLB,, | Performed by: FAMILY MEDICINE

## 2020-11-23 PROCEDURE — 3008F PR BODY MASS INDEX (BMI) DOCUMENTED: ICD-10-PCS | Mod: CPTII,S$GLB,, | Performed by: FAMILY MEDICINE

## 2020-11-23 PROCEDURE — 1125F PR PAIN SEVERITY QUANTIFIED, PAIN PRESENT: ICD-10-PCS | Mod: S$GLB,,, | Performed by: FAMILY MEDICINE

## 2020-11-23 RX ORDER — OXYBUTYNIN CHLORIDE 5 MG/1
TABLET, EXTENDED RELEASE ORAL
COMMUNITY
Start: 2020-11-11 | End: 2021-09-16 | Stop reason: SDUPTHER

## 2020-11-23 RX ORDER — ONDANSETRON 4 MG/1
TABLET, ORALLY DISINTEGRATING ORAL
COMMUNITY
Start: 2020-11-03 | End: 2021-06-16

## 2020-11-23 RX ORDER — AMOXICILLIN AND CLAVULANATE POTASSIUM 875; 125 MG/1; MG/1
1 TABLET, FILM COATED ORAL 2 TIMES DAILY
Qty: 10 TABLET | Refills: 0 | Status: SHIPPED | OUTPATIENT
Start: 2020-11-23 | End: 2020-11-28

## 2020-11-23 RX ORDER — FLUTICASONE PROPIONATE AND SALMETEROL XINAFOATE 115; 21 UG/1; UG/1
AEROSOL, METERED RESPIRATORY (INHALATION)
COMMUNITY
Start: 2020-10-08 | End: 2021-09-16 | Stop reason: SDUPTHER

## 2020-11-23 RX ORDER — PANTOPRAZOLE SODIUM 40 MG/1
TABLET, DELAYED RELEASE ORAL
COMMUNITY
Start: 2020-10-12 | End: 2021-06-16

## 2020-11-23 RX ORDER — IPRATROPIUM BROMIDE AND ALBUTEROL SULFATE 2.5; .5 MG/3ML; MG/3ML
SOLUTION RESPIRATORY (INHALATION)
COMMUNITY
Start: 2020-11-16 | End: 2021-09-16 | Stop reason: SDUPTHER

## 2020-11-23 NOTE — PATIENT INSTRUCTIONS
Take probiotic/yogurt 2-3 hours after taking antibiotic to avoid diarrhea.      Amoxicillin; Clavulanic Acid tablets  What is this medicine?  AMOXICILLIN; CLAVULANIC ACID (a mox i LUIS in; DANYEL john AS id) is a penicillin antibiotic. It is used to treat certain kinds of bacterial infections. It will not work for colds, flu, or other viral infections.  How should I use this medicine?  Take this medicine by mouth with a full glass of water. Follow the directions on the prescription label. Take at the start of a meal. Do not crush or chew. If the tablet has a score line, you may cut it in half at the score line for easier swallowing. Take your medicine at regular intervals. Do not take your medicine more often than directed. Take all of your medicine as directed even if you think you are better. Do not skip doses or stop your medicine early.  Talk to your pediatrician regarding the use of this medicine in children. Special care may be needed.  What side effects may I notice from receiving this medicine?  Side effects that you should report to your doctor or health care professional as soon as possible:  · allergic reactions like skin rash, itching or hives, swelling of the face, lips, or tongue  · breathing problems  · dark urine  · fever or chills, sore throat  · redness, blistering, peeling or loosening of the skin, including inside the mouth  · seizures  · trouble passing urine or change in the amount of urine  · unusual bleeding, bruising  · unusually weak or tired  · white patches or sores in the mouth or throat  Side effects that usually do not require medical attention (report to your doctor or health care professional if they continue or are bothersome):  · diarrhea  · dizziness  · headache  · nausea, vomiting  · stomach upset  · vaginal or anal irritation  What may interact with this medicine?  · allopurinol  · anticoagulants  · birth control pills  · methotrexate  · probenecid  What if I miss a dose?  If  you miss a dose, take it as soon as you can. If it is almost time for your next dose, take only that dose. Do not take double or extra doses.  Where should I keep my medicine?  Keep out of the reach of children.  Store at room temperature below 25 degrees C (77 degrees F). Keep container tightly closed. Throw away any unused medicine after the expiration date.  What should I tell my health care provider before I take this medicine?  They need to know if you have any of these conditions:  · bowel disease, like colitis  · kidney disease  · liver disease  · mononucleosis  · an unusual or allergic reaction to amoxicillin, penicillin, cephalosporin, other antibiotics, clavulanic acid, other medicines, foods, dyes, or preservatives  · pregnant or trying to get pregnant  · breast-feeding  What should I watch for while using this medicine?  Tell your doctor or health care professional if your symptoms do not improve.  Do not treat diarrhea with over the counter products. Contact your doctor if you have diarrhea that lasts more than 2 days or if it is severe and watery.  If you have diabetes, you may get a false-positive result for sugar in your urine. Check with your doctor or health care professional.  Birth control pills may not work properly while you are taking this medicine. Talk to your doctor about using an extra method of birth control.  NOTE:This sheet is a summary. It may not cover all possible information. If you have questions about this medicine, talk to your doctor, pharmacist, or health care provider. Copyright© 2017 [employer]      Technique for using nasal spray:  1. Blow nose  2. Lean forward  3. Angle spray outward, using opposite hand to opposite nostril  4. Hold breath and spray  5. Massage nasal fold to retain medicine in nose  6. Avoid blowing nose after administration of medicine    Wrong techniques include:  1. Sitting upright  2. Inhaling quickly when administering medicine  3. Blowing nose  after administering medicine  4. Feeling medicine trickle down throat

## 2020-11-23 NOTE — PROGRESS NOTES
"Subjective:       Patient ID: Laura Vora is a 50 y.o. female.    Chief Complaint: Ear Problem    HPI  Onset this AM  Left ear pain  Denies ear drainage  8/10 severity  Denies fever  Tender LAD  A/w trouble swallowing  Continues to smoke but did not today  Review of Systems   Constitutional: Negative for fever.   HENT: Positive for ear pain, nosebleeds, sore throat and trouble swallowing. Negative for ear discharge.    Musculoskeletal: Positive for gait problem.   Psychiatric/Behavioral: The patient is nervous/anxious.         Objective:   BP (!) 124/90 (BP Location: Right arm, Patient Position: Sitting, BP Method: Large (Manual))   Pulse (!) 112   Temp 97.7 °F (36.5 °C) (Temporal)   Ht 5' 6" (1.676 m)   Wt (!) 145 kg (319 lb 10.7 oz)   LMP 09/05/2014 (Exact Date)   SpO2 99%   BMI 51.60 kg/m²     BP Readings from Last 3 Encounters:   11/23/20 (!) 124/90   09/03/20 110/84   11/15/19 (!) 142/88       No results found for: LABA1C, HGBA1C    Physical Exam  Vitals signs reviewed.   Constitutional:       General: She is not in acute distress.     Appearance: Normal appearance. She is well-developed. She is not ill-appearing or toxic-appearing.   HENT:      Head: Normocephalic and atraumatic.      Right Ear: Hearing normal. No decreased hearing noted. No drainage or tenderness. There is no impacted cerumen.      Left Ear: Hearing normal. No decreased hearing noted. No drainage or tenderness. A middle ear effusion is present. There is no impacted cerumen. Tympanic membrane is not injected.      Ears:      Comments: No pain on palpation of pre or post auricle  No pain on elevation of helix       Nose:      Right Turbinates: Enlarged and swollen.      Left Turbinates: Not enlarged, swollen or pale.      Mouth/Throat:      Pharynx: Pharyngeal swelling and posterior oropharyngeal erythema present. No oropharyngeal exudate or uvula swelling.   Neck:      Musculoskeletal: Normal range of motion.   Cardiovascular:      " Rate and Rhythm: Tachycardia present.   Pulmonary:      Effort: Pulmonary effort is normal. No respiratory distress.   Abdominal:      Palpations: Abdomen is soft.   Musculoskeletal: Normal range of motion.      Comments: Using crutches for ambulation   Lymphadenopathy:      Cervical: Cervical adenopathy present.      Left cervical: Superficial cervical adenopathy present.      Comments: Tender lad   Skin:     General: Skin is warm and dry.   Neurological:      Mental Status: She is alert and oriented to person, place, and time.      Gait: Gait abnormal.   Psychiatric:         Behavior: Behavior normal.       Assessment:     1. Acute serous otitis media of left ear, recurrence not specified    2. Tobacco abuse    3. Essential hypertension      Plan:     Problem List Items Addressed This Visit        Cardiac/Vascular    Essential hypertension    Current Assessment & Plan     Slightly elevated today in setting of pain            Other    Tobacco abuse    Current Assessment & Plan     Restarted smoking  Wearing nicotine patch           Other Visit Diagnoses     Acute serous otitis media of left ear, recurrence not specified    -  Primary    Relevant Medications    amoxicillin-clavulanate 875-125mg (AUGMENTIN) 875-125 mg per tablet      Offered course of abx. Advised to use probiotic/yogurt 2-3 hours after taking abx to avoid GI upset/diarrhea    Advised correct techinque for nasal spray/flonase and monitor sx. If no improvement, start abx        Follow up if symptoms worsen or fail to improve.

## 2020-11-24 ENCOUNTER — CLINICAL SUPPORT (OUTPATIENT)
Dept: REHABILITATION | Facility: HOSPITAL | Age: 50
End: 2020-11-24
Payer: COMMERCIAL

## 2020-11-24 DIAGNOSIS — M25.562 PAIN IN LATERAL PORTION OF LEFT KNEE: ICD-10-CM

## 2020-11-24 DIAGNOSIS — R26.9 GAIT DIFFICULTY: ICD-10-CM

## 2020-11-24 PROCEDURE — 97110 THERAPEUTIC EXERCISES: CPT

## 2020-11-24 PROCEDURE — 97140 MANUAL THERAPY 1/> REGIONS: CPT

## 2020-11-24 NOTE — PROGRESS NOTES
Physical Therapy Daily Treatment Note and Frequency Update     Name: Laura Garcia [facility] Number: [id number]    Therapy Diagnosis:   Encounter Diagnoses   Name Primary?    Pain in lateral portion of left knee     Gait difficulty      Physician: Edgar Hickey MD    Visit Date: 11/24/2020    Physician Orders: PT Eval and Treat   Medical Diagnosis from Referral: S83.249A (ICD-10-CM) - MMT (medial meniscus tear)  Evaluation Date: 11/10/2020  Authorization Period Expiration: 12/31/2020  Plan of Care Expiration: 1/19/2020  Visit # / Visits authorized: 3/20     Time In: 4:20 pm  Time Out: 5:15 pm  Total Billable Time: 45 minutes     Precautions: Standard, HTN, Asthma     Foto Due date: 5th visit  Progress Note Due Date:12/8/2020    SUBJECTIVE     Today, pt reports: that her ortho still wants her to use the crutches for longer distances, but can be off of them for short distances.     She was compliant with home exercise program.  Response to previous treatment: less stiff  Functional change: walking better on crutches    Pre-Treatment Pain: 6/10  Post-Treatment Pain: 2/10  Location: L knee pain  TREATMENT     Laura received therapeutic exercises to develop strength, endurance, ROM and flexibility for 40 minutes including:    Nustep 5 minutes   SLR + hip AB  Prone glut lift  Standing heel and toe raise 3 second holds  Sidestepping in parallel bars  Shuttle 6 bands B  Shuttle 3 bands L LE  DKTC with ball  Standing glut med against TB    Laura received the following manual therapy techniques: Joint mobilizations, Myofacial release and Soft tissue Mobilization were applied to the: L knee for 5 minutes  Scar massage L knee incision    Laura participated in neuromuscular re-education activities to improve: Balance, Coordination, Sense, Proprioception and Posture for - minutes. The following activities were included:    Laura received the following supervised modalities after being cleared for contradictions:     Laura  received hot pack for 10 minutes to L knee    Laura received cold pack for - minutes to -.  Laura received electrical stimulation for - minutes to -      Home Exercises Provided and Patient Education Provided     Education/Self-Care provided: (during therex)   Patient educated on biomechanical justification for therapeutic exercise and importance of compliance with HEP in order to improve overall impairments and QOL    Patient educated on postural awareness and the use of a lumbar roll when in a seated position to reduce stress and maintain optimal alignment of the spine.    Patient educated on proper ergonomics at the work station in order to maintain optimal alignment of the musculoskeletal system and improve efficiency in the work environment.   Patient educated on the importance of improved core and upper and lower extremity strength in order to improve alignment of the spine and upper and lower extremities with static positions and dynamic movement.    Patient educated on the importance of strong core and lower extremity musculature in order to improve both static and dynamic balance, improve gait mechanics, reduce fall risk and improve household and community mobility.       Written Home Exercises Provided: Patient instructed to cont prior HEP.  Exercises were reviewed and Laura was able to demonstrate them prior to the end of the session.  Laura demonstrated good  understanding of the education provided.     See EMR under Patient Instructions for exercises provided prior visit.    ASSESSMENT   Patient demonstrated good tolerance to more WBing activities with minimal increase in adverse symptoms. Good ROM noted and minimal swelling to L knee.    Laura is progressing well towards her goals.   Pt prognosis is Good.     Pt will continue to benefit from skilled outpatient physical therapy to address the deficits listed in the problem list box on initial evaluation, provide pt/family education and to maximize pt's  level of independence in the home and community environment.     Pt's spiritual, cultural and educational needs considered and pt agreeable to plan of care and goals.     Anticipated barriers to physical therapy: work schedule    Goals:   Short Term Goals: In 4 weeks 12/8/2020:  1.Pt to be educated on HEP.  2.Patient to demo increased AROM to 110 degrees or better with knee flexion. Progressing as smoother transition today.  3.Patient to increase strength in L knee extension and flexion by 1/2 grade.  4.Patient to have decreased pain to 6/10 or better.  5.Patient to increase LE balance to tolerate walking on level kendrick without an AD.  6.Patient to improve score on the FOTO by 5%.        Long Term Goals: In 10 weeks 1/19/2020  1. Patient to perform daily activities including climbing up to 2 flights of steps without limitation.  2. Patient to demonstrate increased knee AROM to 120 degrees or better.  3. Patient to demonstrate increased LE strength to 4+/5 with knee extension and flexion.  4. Patient to have decreased pain to 3/10 or better.  5. Patient to improve score on the FOTO to 30% limitation or better.        Plan   Plan of care Certification: 11/10/2020 to 1/19/2020.     Outpatient Physical Therapy 2-3 times weekly for 10 weeks (effective week of 11/10/2020) to include the following interventions: Electrical Stimulation IFC, NMES, Russian, Gait Training, Manual Therapy, Moist Heat/ Ice, Neuromuscular Re-ed, Patient Education, Self Care, Therapeutic Activites and Therapeutic Exercise.       Continue Plan of Care (POC) and progress per patient tolerance.    Mahesh Recinos, PT

## 2020-11-30 ENCOUNTER — PATIENT MESSAGE (OUTPATIENT)
Dept: INTERNAL MEDICINE | Facility: CLINIC | Age: 50
End: 2020-11-30

## 2020-11-30 DIAGNOSIS — B37.31 CANDIDIASIS OF VAGINA: Primary | ICD-10-CM

## 2020-12-01 RX ORDER — FLUCONAZOLE 150 MG/1
150 TABLET ORAL DAILY
Qty: 1 TABLET | Refills: 0 | Status: SHIPPED | OUTPATIENT
Start: 2020-12-01 | End: 2020-12-02

## 2020-12-04 ENCOUNTER — TELEPHONE (OUTPATIENT)
Dept: REHABILITATION | Facility: HOSPITAL | Age: 50
End: 2020-12-04

## 2020-12-04 NOTE — PROGRESS NOTES
Physical Therapy Assistant Daily Treatment Note     Name: Laura Garcia [facility] Number: [id number]    Therapy Diagnosis:   Encounter Diagnoses   Name Primary?    Pain in lateral portion of left knee     Gait difficulty      Physician: Edgar Hickey MD    Visit Date: 12/7/2020    Physician Orders: PT Eval and Treat   Medical Diagnosis from Referral: S83.249A (ICD-10-CM) - MMT (medial meniscus tear)  Evaluation Date: 11/10/2020  Authorization Period Expiration: 12/31/2020  Plan of Care Expiration: 1/19/2020  Visit # / Visits authorized: 4/20  PTA Visit 1     Time In: 7:40 am  Time Out: 8:40 am  Total Billable Time: 45 minutes     Precautions: Standard, HTN, Asthma     Foto Due date: 5th visit  Progress Note Due Date:12/8/2020    SUBJECTIVE     Today, pt reports: that L knee continues to pop with extension. She has been walking around house without crutches a little.    She was compliant with home exercise program.  Response to previous treatment: less stiff   Functional change: walking better on crutches    Pre-Treatment Pain: 0/10   Post-Treatment Pain: 0/10  Location: L knee pain  TREATMENT     Laura received therapeutic exercises to develop strength, endurance, ROM and flexibility for 45 minutes including:    Bicycle 5 minutes for knee range of motion and joint nutrition  SLR + hip AB 2x10 each  Prone glut lift 2x10  Standing heel and toe raise 3 second holds  Sidestepping in parallel bars  Shuttle 6 bands B  Shuttle 3 bands L LE and RLE  DKTC with ball  Standing glut med against RTB    Laura received the following manual therapy techniques: Joint mobilizations, Myofacial release and Soft tissue Mobilization were applied to the: L knee for 5 minutes  Scar massage L knee incision    Laura participated in neuromuscular re-education activities to improve: Balance, Coordination, Sense, Proprioception and Posture for - minutes. The following activities were included:    Laura received the following supervised  "modalities after being cleared for contradictions:     Alura received hot pack for 10 minutes to L knee    Laura received cold pack for - minutes to -.  Laura received electrical stimulation for - minutes to -      Home Exercises Provided and Patient Education Provided     Education/Self-Care provided: (during therex)   Patient educated on biomechanical justification for therapeutic exercise and importance of compliance with HEP in order to improve overall impairments and QOL    Patient educated on postural awareness and the use of a lumbar roll when in a seated position to reduce stress and maintain optimal alignment of the spine.    Patient educated on proper ergonomics at the work station in order to maintain optimal alignment of the musculoskeletal system and improve efficiency in the work environment.   Patient educated on the importance of improved core and upper and lower extremity strength in order to improve alignment of the spine and upper and lower extremities with static positions and dynamic movement.    Patient educated on the importance of strong core and lower extremity musculature in order to improve both static and dynamic balance, improve gait mechanics, reduce fall risk and improve household and community mobility.       Written Home Exercises Provided: Patient instructed to cont prior HEP.  Exercises were reviewed and Laura was able to demonstrate them prior to the end of the session.  Laura demonstrated good  understanding of the education provided.     See EMR under Patient Instructions for exercises provided prior visit.    ASSESSMENT   Patient presents to therapy using B crutches with smooth step through gait pattern. Ambulated throughout gym without crutches with focus on step through gait pattern as patient tends to take small steps. Apprehensive with knee extension exercises and reports "popping" with single leg squats on shuttle. Increased sensitivity noted with scar mobs to lateral " incision site on LLE. She will benefit from continued skilled therapy to focus on increasing weight bearing, improving gait pattern, and overall LE strength.    Laura is progressing well towards her goals.   Pt prognosis is Good.     Pt will continue to benefit from skilled outpatient physical therapy to address the deficits listed in the problem list box on initial evaluation, provide pt/family education and to maximize pt's level of independence in the home and community environment.     Pt's spiritual, cultural and educational needs considered and pt agreeable to plan of care and goals.     Anticipated barriers to physical therapy: work schedule    Goals:   Short Term Goals: In 4 weeks 12/8/2020:  1.Pt to be educated on HEP.  2.Patient to demo increased AROM to 110 degrees or better with knee flexion. Progressing as smoother transition today.  3.Patient to increase strength in L knee extension and flexion by 1/2 grade.  4.Patient to have decreased pain to 6/10 or better.  5.Patient to increase LE balance to tolerate walking on level kendrick without an AD.  6.Patient to improve score on the FOTO by 5%.        Long Term Goals: In 10 weeks 1/19/2020  1. Patient to perform daily activities including climbing up to 2 flights of steps without limitation.  2. Patient to demonstrate increased knee AROM to 120 degrees or better.  3. Patient to demonstrate increased LE strength to 4+/5 with knee extension and flexion.  4. Patient to have decreased pain to 3/10 or better.  5. Patient to improve score on the FOTO to 30% limitation or better.        Plan   Plan of care Certification: 11/10/2020 to 1/19/2020.     Outpatient Physical Therapy 2-3 times weekly for 10 weeks (effective week of 11/10/2020) to include the following interventions: Electrical Stimulation IFC, NMES, Russian, Gait Training, Manual Therapy, Moist Heat/ Ice, Neuromuscular Re-ed, Patient Education, Self Care, Therapeutic Activites and Therapeutic Exercise.        Continue Plan of Care (POC) and progress per patient tolerance.    Amada Lyle, PTA

## 2020-12-07 ENCOUNTER — CLINICAL SUPPORT (OUTPATIENT)
Dept: REHABILITATION | Facility: HOSPITAL | Age: 50
End: 2020-12-07
Payer: COMMERCIAL

## 2020-12-07 DIAGNOSIS — M25.562 PAIN IN LATERAL PORTION OF LEFT KNEE: ICD-10-CM

## 2020-12-07 DIAGNOSIS — R26.9 GAIT DIFFICULTY: ICD-10-CM

## 2020-12-07 PROCEDURE — 97110 THERAPEUTIC EXERCISES: CPT | Mod: CQ

## 2020-12-08 ENCOUNTER — CLINICAL SUPPORT (OUTPATIENT)
Dept: REHABILITATION | Facility: HOSPITAL | Age: 50
End: 2020-12-08
Payer: COMMERCIAL

## 2020-12-08 ENCOUNTER — DOCUMENTATION ONLY (OUTPATIENT)
Dept: REHABILITATION | Facility: HOSPITAL | Age: 50
End: 2020-12-08

## 2020-12-08 DIAGNOSIS — M25.562 PAIN IN LATERAL PORTION OF LEFT KNEE: ICD-10-CM

## 2020-12-08 DIAGNOSIS — R53.1 WEAKNESS: Primary | ICD-10-CM

## 2020-12-08 PROCEDURE — 97110 THERAPEUTIC EXERCISES: CPT | Mod: CQ

## 2020-12-08 NOTE — PROGRESS NOTES
PT/PTA met face to face to discuss pt's treatment plan and progress towards established goals. Pt will be seen by a physical therapist minimally every 6th visit or every 30 days.    .    Aretha Lopez PTA

## 2020-12-08 NOTE — PROGRESS NOTES
"  Physical Therapy Assistant Daily Treatment Note     Name: Laura Garcia [facility] Number: [id number]    Therapy Diagnosis:   Encounter Diagnoses   Name Primary?    Pain in lateral portion of left knee     Weakness Yes     Physician: Edgar Hickey MD    Visit Date: 12/8/2020    Physician Orders: PT Eval and Treat   Medical Diagnosis from Referral: S83.249A (ICD-10-CM) - MMT (medial meniscus tear)  Evaluation Date: 11/10/2020  Authorization Period Expiration: 12/31/2020  Plan of Care Expiration: 1/19/2020  Visit # / Visits authorized: 6/20  PTA Visit #2  FOTO next visit    Time In: 7:15 am  Time Out: 7:57 am  Total Billable Time: 42 minutes     Precautions: Standard, HTN, Asthma         SUBJECTIVE     Today, pt reports "it feels like it's going to lock up sometimes"  :She was compliant with home exercise program but not sure if she is doing everything  Response to previous treatment: no issues  Functional change: walking without crutches    Pre-Treatment Pain: 0/10   Post-Treatment Pain: 0/10  Location: na today  TREATMENT     Laura received therapeutic exercises to develop strength, endurance, ROM and flexibility for 37 minutes including:    Bicycle 5 minutes for knee range of motion and joint nutrition  SLR 2x10   Bridges 2x10  Side lying hip abduction 2x10  Side lying hip adduction 2x10  Supine flexion><extension without mat contract 2x10  Seated LAQ w/hold 3# 2x10  Seated tail gator     Standing 45 hip RTB w/2 dowel support B x10 ea    Laura received the following manual therapy techniques: for 5 minutes  Patella mobs  Tibial distraction    Laura participated in neuromuscular re-education activities to improve: Balance, Coordination, Sense, Proprioception and Posture for - minutes. The following activities were included:    Laura received the following supervised modalities after being cleared for contradictions:     Laura received hot pack for 0 minutes . Na today    Laura received cold pack for 0 minutes  Na " today.  Laura received electrical stimulation for 0 minutes . Na today      Home Exercises Provided and Patient Education Provided     Education/Self-Care provided: (during therex)   Patient educated on biomechanical justification for therapeutic exercise and importance of compliance with HEP in order to improve overall impairments and QOL    Patient educated on postural awareness and the use of a lumbar roll when in a seated position to reduce stress and maintain optimal alignment of the spine.    Patient educated on proper ergonomics at the work station in order to maintain optimal alignment of the musculoskeletal system and improve efficiency in the work environment.   Patient educated on the importance of improved core and upper and lower extremity strength in order to improve alignment of the spine and upper and lower extremities with static positions and dynamic movement.    Patient educated on the importance of strong core and lower extremity musculature in order to improve both static and dynamic balance, improve gait mechanics, reduce fall risk and improve household and community mobility.       Written Home Exercises Provided: Patient instructed to cont prior HEP.  Exercises were reviewed and Laura was able to demonstrate them prior to the end of the session.  Laura demonstrated good  understanding of the education provided.     See EMR under Patient Instructions for exercises provided prior visit.    ASSESSMENT   Laura is progressing well towards her goals.   Laura presents today ambulating without her crutches. Her gait was cautious yet steady overall and with a step through gait pattern. She has impaired forward progression however she probably has always laterally shifted with swing to stance due to her structural make up. Her shoes were noted to be worn and providing minimal support and she was encouraged to replace them . Laura verbalized understanding and agreement. Today she continued with general  "strengthening exercises and required intermittent cueing for alignment for improved muscle activation.  She reported feeling a little less "locking" end of session and no pain. Laura will benefit with LE strengthening to include her core/glutes for improved stability.   Pt prognosis is Good.     Pt will continue to benefit from skilled outpatient physical therapy to address the deficits listed in the problem list box on initial evaluation, provide pt/family education and to maximize pt's level of independence in the home and community environment.     Pt's spiritual, cultural and educational needs considered and pt agreeable to plan of care and goals.     Anticipated barriers to physical therapy: work schedule    Goals:   Short Term Goals: In 4 weeks 12/8/2020:  1.Pt to be educated on HEP.MET  2.Patient to demo increased AROM to 110 degrees or better with knee flexion. Progressing as smoother transition  3.Patient to increase strength in L knee extension and flexion by 1/2 grade.  4.Patient to have decreased pain to 6/10 or better.  5.Patient to increase LE balance to tolerate walking on level kendrick without an AD.MET  6.Patient to improve score on the FOTO by 5%.        Long Term Goals: In 10 weeks 1/19/2020  1. Patient to perform daily activities including climbing up to 2 flights of steps without limitation.  2. Patient to demonstrate increased knee AROM to 120 degrees or better.  3. Patient to demonstrate increased LE strength to 4+/5 with knee extension and flexion.  4. Patient to have decreased pain to 3/10 or better.  5. Patient to improve score on the FOTO to 30% limitation or better.        Plan   Plan of care Certification: 11/10/2020 to 1/19/2020.     Outpatient Physical Therapy 2-3 times weekly for 10 weeks (effective week of 11/10/2020) to include the following interventions: Electrical Stimulation IFC, NMES, Russian, Gait Training, Manual Therapy, Moist Heat/ Ice, Neuromuscular Re-ed, Patient " Education, Self Care, Therapeutic Activites and Therapeutic Exercise.       Continue Plan of Care (POC) and progress per patient tolerance.    Aretha Lopez, PTA

## 2020-12-14 ENCOUNTER — CLINICAL SUPPORT (OUTPATIENT)
Dept: REHABILITATION | Facility: HOSPITAL | Age: 50
End: 2020-12-14
Payer: COMMERCIAL

## 2020-12-14 DIAGNOSIS — R26.9 GAIT DIFFICULTY: ICD-10-CM

## 2020-12-14 DIAGNOSIS — R53.1 WEAKNESS: Primary | ICD-10-CM

## 2020-12-14 DIAGNOSIS — M25.562 PAIN IN LATERAL PORTION OF LEFT KNEE: ICD-10-CM

## 2020-12-14 PROCEDURE — 97110 THERAPEUTIC EXERCISES: CPT | Mod: CQ

## 2020-12-14 PROCEDURE — 97140 MANUAL THERAPY 1/> REGIONS: CPT | Mod: CQ

## 2020-12-14 NOTE — PROGRESS NOTES
"  Physical Therapy Assistant Daily Treatment Note     Name: Laura Garcia [facility] Number: [id number]    Therapy Diagnosis:   Encounter Diagnoses   Name Primary?    Pain in lateral portion of left knee     Gait difficulty     Weakness Yes     Physician: Edgar Hickey MD    Visit Date: 12/14/2020    Physician Orders: PT Eval and Treat   Medical Diagnosis from Referral: S83.249A (ICD-10-CM) - MMT (medial meniscus tear)  Evaluation Date: 11/10/2020  Authorization Period Expiration: 12/31/2020  Plan of Care Expiration: 1/19/2020  Visit # / Visits authorized: 7/20  PTA Visit #3  FOTO completed 12/14/2020 (2nd)    Time In: 4:45  Time Out: 5:55  Total Billable Time: 53 minutes     Precautions: Standard, HTN, Asthma         SUBJECTIVE     Today, pt reports she has been having "shooting" pains in the knee for the past 3 days with intermittent numbness and cramping in her thigh. She put a call into her doctor today but has not received a return call.   :She was not compliant with home exercise program   Response to previous treatment: no issues  Functional change: walking without crutches    Pre-Treatment Pain: 10/10   Post-Treatment Pain: 0-1/10  Location: left knee/quad  TREATMENT     Laura received therapeutic exercises to develop strength, endurance, ROM and flexibility for 45 minutes including:    Bicycle 5 minutes for knee range of motion and joint nutrition  Pelvic tilts in hook lying followed by legs on swiss ball x5 min  Prone on forearms 3' and between sets of press ups  Prone press ups 3x10  Small bridge over wedge [id number] sciatic nerve glides on left x10  Standing back extension x5    Seated tail gator         Laura received the following manual therapy techniques: for 8 minutes  STM to quads  Hamstring lengthening    Laura participated in neuromuscular re-education activities to improve: Balance, Coordination, Sense, Proprioception and Posture for - minutes. The following activities were included:    Laura " received the following supervised modalities after being cleared for contradictions: na today    Laura received hot pack for 10 minutes to back and lateral left leg while prone  Laura received cold pack for 0 minutes  Na today.  Laura received electrical stimulation for 0 minutes . Na today      Home Exercises Provided and Patient Education Provided     Education/Self-Care provided: (during therex)   Patient educated on biomechanical justification for therapeutic exercise and importance of compliance with HEP in order to improve overall impairments and QOL    Patient educated on postural awareness and the use of a lumbar roll when in a seated position to reduce stress and maintain optimal alignment of the spine.    Patient educated on proper ergonomics at the work station in order to maintain optimal alignment of the musculoskeletal system and improve efficiency in the work environment.   Patient educated on the importance of improved core and upper and lower extremity strength in order to improve alignment of the spine and upper and lower extremities with static positions and dynamic movement.    Patient educated on the importance of strong core and lower extremity musculature in order to improve both static and dynamic balance, improve gait mechanics, reduce fall risk and improve household and community mobility.             Written Home Exercises Provided: Patient instructed to cont prior HEP.  Exercises were reviewed and Laura was able to demonstrate them prior to the end of the session.  Laura demonstrated good  understanding of the education provided.     See EMR under Patient Instructions for exercises provided prior visit.    ASSESSMENT   Laura is progressing well towards her goals.   Laura presents today reporting significant pain in her left leg down to her knee with intermittent episodes of numbness and cramping. This has been going on for 3 days without relief and as a result she is limping with an  antalgic gait pattern. Liliane Meyers PT was informed of patient's status today and she assessed Laura with instructions given to this PTA. Laura was challenged initially with over activating her hamstrings which caused some muscle cramping however with some modifications, she was able to activate her glutes better. Laura tolerated all activities very well. She had some neural tension in her left sciatic nerve. Laura reported a reduction in symptoms end of session from 10/10 to 0-1/10 and demonstrated a significant improvement in her gait mechanics. She was instructed to continue with pelvic tilts and back extension exercises with her HEP or with symptoms. Laura verbalized understanding.   Pt prognosis is Good.     Pt will continue to benefit from skilled outpatient physical therapy to address the deficits listed in the problem list box on initial evaluation, provide pt/family education and to maximize pt's level of independence in the home and community environment.     Pt's spiritual, cultural and educational needs considered and pt agreeable to plan of care and goals.     Anticipated barriers to physical therapy: work schedule    Goals:   Short Term Goals: In 4 weeks 12/8/2020:  1.Pt to be educated on HEP.MET  2.Patient to demo increased AROM to 110 degrees or better with knee flexion. Progressing as smoother transition  3.Patient to increase strength in L knee extension and flexion by 1/2 grade.  4.Patient to have decreased pain to 6/10 or better.  5.Patient to increase LE balance to tolerate walking on level kendrick without an AD.MET  6.Patient to improve score on the FOTO by 5%.        Long Term Goals: In 10 weeks 1/19/2020  1. Patient to perform daily activities including climbing up to 2 flights of steps without limitation.  2. Patient to demonstrate increased knee AROM to 120 degrees or better.  3. Patient to demonstrate increased LE strength to 4+/5 with knee extension and flexion.  4. Patient to have  decreased pain to 3/10 or better.  5. Patient to improve score on the FOTO to 30% limitation or better.        Plan   Plan of care Certification: 11/10/2020 to 1/19/2020.     Outpatient Physical Therapy 2-3 times weekly for 10 weeks (effective week of 11/10/2020) to include the following interventions: Electrical Stimulation IFC, NMES, Russian, Gait Training, Manual Therapy, Moist Heat/ Ice, Neuromuscular Re-ed, Patient Education, Self Care, Therapeutic Activites and Therapeutic Exercise.       Continue Plan of Care (POC) and progress per patient tolerance.    Aretha Lopez, PTA

## 2020-12-17 ENCOUNTER — IMMUNIZATION (OUTPATIENT)
Dept: INTERNAL MEDICINE | Facility: CLINIC | Age: 50
End: 2020-12-17

## 2020-12-17 DIAGNOSIS — Z23 NEED FOR VACCINATION: ICD-10-CM

## 2020-12-17 PROCEDURE — 0001A COVID-19, MRNA, LNP-S, PF, 30 MCG/0.3 ML DOSE VACCINE: CPT | Mod: CV19,S$GLB,, | Performed by: FAMILY MEDICINE

## 2020-12-17 PROCEDURE — 91300 COVID-19, MRNA, LNP-S, PF, 30 MCG/0.3 ML DOSE VACCINE: CPT | Mod: S$GLB,,, | Performed by: FAMILY MEDICINE

## 2020-12-17 PROCEDURE — 91300 COVID-19, MRNA, LNP-S, PF, 30 MCG/0.3 ML DOSE VACCINE: ICD-10-PCS | Mod: S$GLB,,, | Performed by: FAMILY MEDICINE

## 2020-12-17 PROCEDURE — 0001A COVID-19, MRNA, LNP-S, PF, 30 MCG/0.3 ML DOSE VACCINE: ICD-10-PCS | Mod: CV19,S$GLB,, | Performed by: FAMILY MEDICINE

## 2020-12-17 RX ORDER — DEXTROAMPHETAMINE SACCHARATE, AMPHETAMINE ASPARTATE MONOHYDRATE, DEXTROAMPHETAMINE SULFATE AND AMPHETAMINE SULFATE 5; 5; 5; 5 MG/1; MG/1; MG/1; MG/1
20 CAPSULE, EXTENDED RELEASE ORAL EVERY MORNING
Qty: 30 CAPSULE | Refills: 0 | Status: SHIPPED | OUTPATIENT
Start: 2020-12-17 | End: 2021-03-08

## 2020-12-17 RX ORDER — DEXTROAMPHETAMINE SACCHARATE, AMPHETAMINE ASPARTATE MONOHYDRATE, DEXTROAMPHETAMINE SULFATE AND AMPHETAMINE SULFATE 5; 5; 5; 5 MG/1; MG/1; MG/1; MG/1
20 CAPSULE, EXTENDED RELEASE ORAL EVERY MORNING
Qty: 30 CAPSULE | Refills: 0 | Status: SHIPPED | OUTPATIENT
Start: 2020-12-17 | End: 2021-01-20 | Stop reason: SDUPTHER

## 2020-12-18 ENCOUNTER — CLINICAL SUPPORT (OUTPATIENT)
Dept: REHABILITATION | Facility: HOSPITAL | Age: 50
End: 2020-12-18
Payer: COMMERCIAL

## 2020-12-18 DIAGNOSIS — R53.1 WEAKNESS: ICD-10-CM

## 2020-12-18 DIAGNOSIS — M25.562 PAIN IN LATERAL PORTION OF LEFT KNEE: Primary | ICD-10-CM

## 2020-12-18 DIAGNOSIS — R26.9 GAIT DIFFICULTY: ICD-10-CM

## 2020-12-18 PROCEDURE — 97110 THERAPEUTIC EXERCISES: CPT | Mod: CQ

## 2020-12-18 NOTE — PROGRESS NOTES
"  Physical Therapy Assistant Daily Treatment Note     Name: Laura Garcia [facility] Number: [id number]    Therapy Diagnosis:   Encounter Diagnoses   Name Primary?    Pain in lateral portion of left knee Yes    Weakness      Physician: Edgar Hickey MD    Visit Date: 12/18/2020    Physician Orders: PT Eval and Treat   Medical Diagnosis from Referral: S83.249A (ICD-10-CM) - MMT (medial meniscus tear)  Evaluation Date: 11/10/2020  Authorization Period Expiration: 12/31/2020  Plan of Care Expiration: 1/19/2020  Visit # / Visits authorized: 8/20  PTA Visit #4  FOTO completed 12/14/2020 (2nd)    Time In: 7:40  Patient 10 min late  Time Out: 8:35  Total Billable Time: 40  minutes     Precautions: Standard, HTN, Asthma         SUBJECTIVE     Today, pt reports  Her symptoms of numbness have resolved and she continues to do the new exercises she started last session for radicular symptoms. She continues to have intermittent knee pain. It is random and sometimes medial, sometimes lateral. Her knee continues to "pop" as per her description.   She was compliant with home exercise program   Response to previous treatment: relief of radicular symptoms in left thigh  Functional change: walking without crutches    Pre-Treatment Pain: 0/10   Post-Treatment Pain: 0/10  Location: na today  TREATMENT     Laura received therapeutic exercises to develop strength, endurance, ROM and flexibility for 40 minutes including:    Bicycle 5 minutes for knee range of motion and joint nutrition  [employer] wipers x20  Pelvic tilts w/ legs on swiss ball x 20    Small bridge over wedge 3x10   SLR 3x10   Side lying hip abduction [id number] lying hip adduction 3x10  Prone hip extension [id number] bug holds 90/90 w/ball squeeze 10" x5  Dynamic HSS w/sciatic gliding  x 12    Laura received the following manual therapy techniques: for 0 minutes      Laura participated in neuromuscular re-education activities to improve: Balance, Coordination, Sense, " Proprioception and Posture for - minutes. The following activities were included:    Laura received the following supervised modalities after being cleared for contradictions: na today    Laura received hot pack for 10 minutes to back while prone    Laura received electrical stimulation for 0 minutes . Na today      Home Exercises Provided and Patient Education Provided     Education/Self-Care provided: (during therex)   Patient educated on biomechanical justification for therapeutic exercise and importance of compliance with HEP in order to improve overall impairments and QOL    Patient educated on postural awareness and the use of a lumbar roll when in a seated position to reduce stress and maintain optimal alignment of the spine.    Patient educated on proper ergonomics at the work station in order to maintain optimal alignment of the musculoskeletal system and improve efficiency in the work environment.   Patient educated on the importance of improved core and upper and lower extremity strength in order to improve alignment of the spine and upper and lower extremities with static positions and dynamic movement.    Patient educated on the importance of strong core and lower extremity musculature in order to improve both static and dynamic balance, improve gait mechanics, reduce fall risk and improve household and community mobility.             Written Home Exercises Provided: Patient instructed to cont prior HEP.  Exercises were reviewed and Laura was able to demonstrate them prior to the end of the session.  Laura demonstrated good  understanding of the education provided.     See EMR under Patient Instructions for exercises provided prior visit.    ASSESSMENT   Laura is progressing well towards her goals.   Laura presents today reporting resolution of radicular symptoms in her left leg since last session. She has been doing her HEP.  Today,she was able to activate her glutes better with her exercises however  still somewhat impaired and still requiring cueing from PTA. Initiated dead bug holds today to start working on core stabilization as Laura is very weak which impacts her overall stabilization.  Laura tolerated all activities very well and without complaints of pain or adverse symptoms. She had some neural tension in her left sciatic nerve but slightly improved since last session.   Pt prognosis is Good.     Pt will continue to benefit from skilled outpatient physical therapy to address the deficits listed in the problem list box on initial evaluation, provide pt/family education and to maximize pt's level of independence in the home and community environment.     Pt's spiritual, cultural and educational needs considered and pt agreeable to plan of care and goals.     Anticipated barriers to physical therapy: work schedule    Goals:   Short Term Goals: In 4 weeks 12/8/2020:  1.Pt to be educated on HEP.MET  2.Patient to demo increased AROM to 110 degrees or better with knee flexion. Progressing as smoother transition  3.Patient to increase strength in L knee extension and flexion by 1/2 grade.  4.Patient to have decreased pain to 6/10 or better.  5.Patient to increase LE balance to tolerate walking on level kendrick without an AD.MET  6.Patient to improve score on the FOTO by 5%.        Long Term Goals: In 10 weeks 1/19/2020  1. Patient to perform daily activities including climbing up to 2 flights of steps without limitation.  2. Patient to demonstrate increased knee AROM to 120 degrees or better.  3. Patient to demonstrate increased LE strength to 4+/5 with knee extension and flexion.  4. Patient to have decreased pain to 3/10 or better.  5. Patient to improve score on the FOTO to 30% limitation or better.        Plan   Plan of care Certification: 11/10/2020 to 1/19/2020.     Outpatient Physical Therapy 2-3 times weekly for 10 weeks (effective week of 11/10/2020) to include the following interventions: Electrical  Stimulation IFC, NMES, Albanian, Gait Training, Manual Therapy, Moist Heat/ Ice, Neuromuscular Re-ed, Patient Education, Self Care, Therapeutic Activites and Therapeutic Exercise.       Continue Plan of Care (POC) and progress per patient tolerance.    Aretha Lopez, PTA

## 2020-12-21 ENCOUNTER — CLINICAL SUPPORT (OUTPATIENT)
Dept: REHABILITATION | Facility: HOSPITAL | Age: 50
End: 2020-12-21
Payer: COMMERCIAL

## 2020-12-21 DIAGNOSIS — M25.562 PAIN IN LATERAL PORTION OF LEFT KNEE: ICD-10-CM

## 2020-12-21 DIAGNOSIS — R26.9 GAIT DIFFICULTY: ICD-10-CM

## 2020-12-21 PROCEDURE — 97112 NEUROMUSCULAR REEDUCATION: CPT

## 2020-12-21 PROCEDURE — 97110 THERAPEUTIC EXERCISES: CPT

## 2020-12-21 NOTE — PROGRESS NOTES
Physical Therapy Daily Treatment Note     Name: Laura Garcia [facility] Number: [id number]    Therapy Diagnosis:   Encounter Diagnoses   Name Primary?    Pain in lateral portion of left knee     Gait difficulty      Physician: Edgar Hickey MD    Visit Date: 12/21/2020    Physician Orders: PT Eval and Treat   Medical Diagnosis from Referral: S83.249A (ICD-10-CM) - MMT (medial meniscus tear)  Evaluation Date: 11/10/2020  Authorization Period Expiration: 12/31/2020  Plan of Care Expiration: 1/19/2020  Visit # / Visits authorized: 9/20  PTA Visit #0  FOTO completed 12/14/2020 (2nd)    Time In: 8:07 am  Patient late  Time Out: 8:45 am  Total Billable Time: 38  minutes     Precautions: Standard, HTN, Asthma         SUBJECTIVE     Today, pt reports  Her symptoms of numbness continue to stay at bay with her HEP but she still has sharp pain in the knee at times.    She was compliant with home exercise program   Response to previous treatment: relief of radicular symptoms in left thigh  Functional change: walking with antalgic gait    Pre-Treatment Pain: 6/10   Post-Treatment Pain: 0/10  Location: L knee; 0/10 low back       OBJECTIVE   Gait: antalgic but no longer using crutches.     Balance: R LE single leg stand= unable at eval 5 sec today, L LE single leg stand = unable at eval but 6 sec today after practice on foot control                 Knee AROM:                         at eval              (R)       (L)  Today R L                                      Flexion                         120      90  120 115                                      Extension                    0          10  0 3                                       Extension (sitting)       0          5  0 3                Hip AROM:                 Flexion                         Limited by soft tissue B                                      ER                               45        40  45 45                                      IR                       "           20        15   20 20                                       ABD                             20        15  20 20  Ankle AROM:             Ankle DF(traci)            10        5  10 10                                           Strength:                                                        R          L  R L  Hip flexors L2                          4/5       3/5  4+ 4   Quadriceps L3                        4/5       3/5        4+ 4                          Hamstrings S1                        4/5       3/5  4+ 4                          Dorsiflexion L4                        4/5       4/5                   5 4+                          Plantar flexion S1                    2/5       2/5  2+ 2+ but at 25% ROM at best B                          Hip Internal Rotation               3+/5     2+/5  4 4                          Hip External Rotation              3+/5     2+/5  4 3+                           Gluteus Medius L45S1           3+/5     2/5  4 3+                           Gluteus Manan L5S12        3+/5     2/5  4- 3                             TREATMENT     Laura received therapeutic exercises to develop strength, endurance, ROM and flexibility for 23 minutes including:    Prone delia extension 2x10  Prone hip extension 2x10 B  Reverse clams with red theraband 3x10 B  SLR 3x10 B with Transverse abdominus activation  Posterior pelvic tilt 2x10  Bridges 2x15           Deferred:  Windshield wipers x20  Side lying hip abduction [id number] lying hip adduction 3x10  Prone hip extension [id number] bug holds 90/90 w/ball squeeze 10" x5  Dynamic HSS w/sciatic gliding  x 12    Laura received the following manual therapy techniques: for 0 minutes      Laura participated in neuromuscular re-education activities to improve: Balance, Coordination, Sense, Proprioception and Posture for 15 minutes. The following activities were included:  Modified lunges at parallel bars with plate under the foot 3x10 B to focus on hip " flexor stretch, single leg balance and gluteal activation  Side stepping with red theraband at mid calves 2x10  Single leg stand with toe push down on great toe 3x5 sec each leg  Laura received the following supervised modalities after being cleared for contradictions: na today    Laura received hot pack for 10 minutes to back while prone    Laura received electrical stimulation for 0 minutes . Na today      Home Exercises Provided and Patient Education Provided     Education/Self-Care provided: (during therex)   Patient educated on biomechanical justification for therapeutic exercise and importance of compliance with HEP in order to improve overall impairments and QOL    Patient educated on postural awareness and the use of a lumbar roll when in a seated position to reduce stress and maintain optimal alignment of the spine.    Patient educated on proper ergonomics at the work station in order to maintain optimal alignment of the musculoskeletal system and improve efficiency in the work environment.   Patient educated on the importance of improved core and upper and lower extremity strength in order to improve alignment of the spine and upper and lower extremities with static positions and dynamic movement.    Patient educated on the importance of strong core and lower extremity musculature in order to improve both static and dynamic balance, improve gait mechanics, reduce fall risk and improve household and community mobility.             Written Home Exercises Provided: Patient instructed to cont prior HEP.  Exercises were reviewed and Laura was able to demonstrate them prior to the end of the session.  Laura demonstrated good  understanding of the education provided.     See EMR under Patient Instructions for exercises provided prior visit.    ASSESSMENT   Laura is progressing well towards her goals.   Laura presents today with no pain in the back or numb/tingling but sharp pain is still occurring in the knee at  times. PT assessed pt and noted trigger points in the quad and lateral ITB area and gluteals, so we continued with therex to increase core support and stretch the hip flexor and quad and to strengthen the glute med that was the weakest after weeks of antalgic gait. Pt has improved in strength and ROM but is still lagging mildly with knee flexion and extension compared to the R knee. PT used taping today to assist with VMO activation. PT upgraded tasks as tolerated to begin increasing mobility functionally and to challenge balance more. PT to add single leg step ups and sit to stands from high surfaces to increase gluteal support. Laura tolerated all activities very well and without complaints of pain or adverse symptoms. She had some neural tension in her left sciatic nerve but slightly improved since last session.   Pt prognosis is Good.     Pt will continue to benefit from skilled outpatient physical therapy to address the deficits listed in the problem list box on initial evaluation, provide pt/family education and to maximize pt's level of independence in the home and community environment.     Pt's spiritual, cultural and educational needs considered and pt agreeable to plan of care and goals.     Anticipated barriers to physical therapy: work schedule    Goals:   Short Term Goals: In 4 weeks 12/8/2020:  1.Pt to be educated on HEP.MET  2.Patient to demo increased AROM to 110 degrees or better with knee flexion. Met  3.Patient to increase strength in L knee extension and flexion by 1/2 grade. Met  4.Patient to have decreased pain to 6/10 or better. Met  5.Patient to increase LE balance to tolerate walking on level kendrick without an AD.MET  6.Patient to improve score on the FOTO by 5%. Not met        Long Term Goals: In 10 weeks 1/19/2020  1. Patient to perform daily activities including climbing up to 2 flights of steps without limitation.  2. Patient to demonstrate increased knee AROM to 120 degrees or  better.  3. Patient to demonstrate increased LE strength to 4+/5 with knee extension and flexion.  4. Patient to have decreased pain to 3/10 or better.  5. Patient to improve score on the FOTO to 30% limitation or better.        Plan   Plan of care Certification: 11/10/2020 to 1/19/2020.     Outpatient Physical Therapy 2-3 times weekly for 10 weeks (effective week of 11/10/2020) to include the following interventions: Electrical Stimulation IFC, NMES, Russian, Gait Training, Manual Therapy, Moist Heat/ Ice, Neuromuscular Re-ed, Patient Education, Self Care, Therapeutic Activites and Therapeutic Exercise.       Continue Plan of Care (POC) and progress per patient tolerance.    Liliane Meyers, PT

## 2020-12-29 DIAGNOSIS — S83.249A TEAR OF MEDIAL MENISCUS OF KNEE JOINT: Primary | ICD-10-CM

## 2020-12-30 DIAGNOSIS — S83.249A TEAR OF MEDIAL MENISCUS OF KNEE JOINT: Primary | ICD-10-CM

## 2021-01-04 ENCOUNTER — CLINICAL SUPPORT (OUTPATIENT)
Dept: REHABILITATION | Facility: HOSPITAL | Age: 51
End: 2021-01-04
Payer: COMMERCIAL

## 2021-01-04 DIAGNOSIS — R26.9 GAIT DIFFICULTY: ICD-10-CM

## 2021-01-04 DIAGNOSIS — S83.242D TEAR OF MEDIAL MENISCUS OF LEFT KNEE, CURRENT, UNSPECIFIED TEAR TYPE, SUBSEQUENT ENCOUNTER: ICD-10-CM

## 2021-01-04 DIAGNOSIS — M25.562 PAIN IN LATERAL PORTION OF LEFT KNEE: ICD-10-CM

## 2021-01-04 DIAGNOSIS — S83.249A TEAR OF MEDIAL MENISCUS OF KNEE JOINT: ICD-10-CM

## 2021-01-04 PROCEDURE — 97110 THERAPEUTIC EXERCISES: CPT

## 2021-01-07 ENCOUNTER — IMMUNIZATION (OUTPATIENT)
Dept: INTERNAL MEDICINE | Facility: CLINIC | Age: 51
End: 2021-01-07
Payer: COMMERCIAL

## 2021-01-07 DIAGNOSIS — Z23 NEED FOR VACCINATION: ICD-10-CM

## 2021-01-07 PROCEDURE — 0002A COVID-19, MRNA, LNP-S, PF, 30 MCG/0.3 ML DOSE VACCINE: CPT | Mod: PBBFAC | Performed by: FAMILY MEDICINE

## 2021-01-07 PROCEDURE — 91300 COVID-19, MRNA, LNP-S, PF, 30 MCG/0.3 ML DOSE VACCINE: CPT | Mod: PBBFAC | Performed by: FAMILY MEDICINE

## 2021-01-12 ENCOUNTER — CLINICAL SUPPORT (OUTPATIENT)
Dept: REHABILITATION | Facility: HOSPITAL | Age: 51
End: 2021-01-12
Payer: COMMERCIAL

## 2021-01-12 DIAGNOSIS — R26.9 GAIT DIFFICULTY: ICD-10-CM

## 2021-01-12 DIAGNOSIS — M25.562 PAIN IN LATERAL PORTION OF LEFT KNEE: ICD-10-CM

## 2021-01-12 PROCEDURE — 97140 MANUAL THERAPY 1/> REGIONS: CPT | Mod: CQ

## 2021-01-12 PROCEDURE — 97110 THERAPEUTIC EXERCISES: CPT | Mod: CQ

## 2021-01-15 ENCOUNTER — CLINICAL SUPPORT (OUTPATIENT)
Dept: REHABILITATION | Facility: HOSPITAL | Age: 51
End: 2021-01-15
Payer: COMMERCIAL

## 2021-01-15 DIAGNOSIS — R26.9 GAIT DIFFICULTY: ICD-10-CM

## 2021-01-15 DIAGNOSIS — M25.562 PAIN IN LATERAL PORTION OF LEFT KNEE: ICD-10-CM

## 2021-01-15 PROCEDURE — 97140 MANUAL THERAPY 1/> REGIONS: CPT | Mod: CQ

## 2021-01-15 PROCEDURE — 97110 THERAPEUTIC EXERCISES: CPT | Mod: CQ

## 2021-01-21 ENCOUNTER — CLINICAL SUPPORT (OUTPATIENT)
Dept: REHABILITATION | Facility: HOSPITAL | Age: 51
End: 2021-01-21
Payer: COMMERCIAL

## 2021-01-21 DIAGNOSIS — R26.9 GAIT DIFFICULTY: ICD-10-CM

## 2021-01-21 DIAGNOSIS — M25.562 PAIN IN LATERAL PORTION OF LEFT KNEE: ICD-10-CM

## 2021-01-21 PROCEDURE — 97110 THERAPEUTIC EXERCISES: CPT

## 2021-01-25 RX ORDER — DEXTROAMPHETAMINE SACCHARATE, AMPHETAMINE ASPARTATE MONOHYDRATE, DEXTROAMPHETAMINE SULFATE AND AMPHETAMINE SULFATE 5; 5; 5; 5 MG/1; MG/1; MG/1; MG/1
20 CAPSULE, EXTENDED RELEASE ORAL EVERY MORNING
Qty: 30 CAPSULE | Refills: 0 | Status: SHIPPED | OUTPATIENT
Start: 2021-01-25 | End: 2021-03-08

## 2021-01-26 ENCOUNTER — CLINICAL SUPPORT (OUTPATIENT)
Dept: REHABILITATION | Facility: HOSPITAL | Age: 51
End: 2021-01-26
Payer: COMMERCIAL

## 2021-01-26 DIAGNOSIS — M25.562 PAIN IN LATERAL PORTION OF LEFT KNEE: ICD-10-CM

## 2021-01-26 DIAGNOSIS — R26.9 GAIT DIFFICULTY: ICD-10-CM

## 2021-01-26 PROCEDURE — 97112 NEUROMUSCULAR REEDUCATION: CPT

## 2021-01-26 PROCEDURE — 97110 THERAPEUTIC EXERCISES: CPT

## 2021-02-09 ENCOUNTER — TELEPHONE (OUTPATIENT)
Dept: REHABILITATION | Facility: HOSPITAL | Age: 51
End: 2021-02-09

## 2021-02-17 ENCOUNTER — TELEPHONE (OUTPATIENT)
Dept: REHABILITATION | Facility: HOSPITAL | Age: 51
End: 2021-02-17

## 2021-02-23 ENCOUNTER — TELEPHONE (OUTPATIENT)
Dept: REHABILITATION | Facility: HOSPITAL | Age: 51
End: 2021-02-23

## 2021-02-25 ENCOUNTER — CLINICAL SUPPORT (OUTPATIENT)
Dept: REHABILITATION | Facility: HOSPITAL | Age: 51
End: 2021-02-25
Payer: COMMERCIAL

## 2021-02-25 DIAGNOSIS — M25.562 PAIN IN LATERAL PORTION OF LEFT KNEE: ICD-10-CM

## 2021-02-25 DIAGNOSIS — R26.9 GAIT DIFFICULTY: ICD-10-CM

## 2021-02-25 DIAGNOSIS — R53.1 WEAKNESS: Primary | ICD-10-CM

## 2021-02-25 PROCEDURE — 97110 THERAPEUTIC EXERCISES: CPT | Mod: CQ

## 2021-02-25 PROCEDURE — 97112 NEUROMUSCULAR REEDUCATION: CPT | Mod: CQ

## 2021-03-03 ENCOUNTER — TELEPHONE (OUTPATIENT)
Dept: REHABILITATION | Facility: HOSPITAL | Age: 51
End: 2021-03-03

## 2021-03-08 ENCOUNTER — TELEPHONE (OUTPATIENT)
Dept: REHABILITATION | Facility: HOSPITAL | Age: 51
End: 2021-03-08

## 2021-03-08 ENCOUNTER — DOCUMENTATION ONLY (OUTPATIENT)
Dept: REHABILITATION | Facility: HOSPITAL | Age: 51
End: 2021-03-08

## 2021-03-08 ENCOUNTER — OFFICE VISIT (OUTPATIENT)
Dept: PSYCHIATRY | Facility: CLINIC | Age: 51
End: 2021-03-08
Payer: COMMERCIAL

## 2021-03-08 DIAGNOSIS — F41.1 GENERALIZED ANXIETY DISORDER WITH PANIC ATTACKS: ICD-10-CM

## 2021-03-08 DIAGNOSIS — F90.0 ATTENTION DEFICIT HYPERACTIVITY DISORDER (ADHD), PREDOMINANTLY INATTENTIVE TYPE: Primary | ICD-10-CM

## 2021-03-08 DIAGNOSIS — F41.0 GENERALIZED ANXIETY DISORDER WITH PANIC ATTACKS: ICD-10-CM

## 2021-03-08 DIAGNOSIS — G47.00 INSOMNIA, UNSPECIFIED TYPE: ICD-10-CM

## 2021-03-08 PROBLEM — M25.562 PAIN IN LATERAL PORTION OF LEFT KNEE: Status: RESOLVED | Noted: 2020-11-10 | Resolved: 2021-03-08

## 2021-03-08 PROBLEM — R26.9 GAIT DIFFICULTY: Status: RESOLVED | Noted: 2020-11-10 | Resolved: 2021-03-08

## 2021-03-08 PROCEDURE — 99214 OFFICE O/P EST MOD 30 MIN: CPT | Mod: 95,,, | Performed by: PSYCHIATRY & NEUROLOGY

## 2021-03-08 PROCEDURE — 99214 PR OFFICE/OUTPT VISIT, EST, LEVL IV, 30-39 MIN: ICD-10-PCS | Mod: 95,,, | Performed by: PSYCHIATRY & NEUROLOGY

## 2021-03-08 RX ORDER — DEXTROAMPHETAMINE SACCHARATE, AMPHETAMINE ASPARTATE MONOHYDRATE, DEXTROAMPHETAMINE SULFATE AND AMPHETAMINE SULFATE 7.5; 7.5; 7.5; 7.5 MG/1; MG/1; MG/1; MG/1
30 CAPSULE, EXTENDED RELEASE ORAL EVERY MORNING
Qty: 30 CAPSULE | Refills: 0 | Status: SHIPPED | OUTPATIENT
Start: 2021-04-07 | End: 2021-08-12 | Stop reason: SDUPTHER

## 2021-03-08 RX ORDER — DEXTROAMPHETAMINE SACCHARATE, AMPHETAMINE ASPARTATE MONOHYDRATE, DEXTROAMPHETAMINE SULFATE AND AMPHETAMINE SULFATE 7.5; 7.5; 7.5; 7.5 MG/1; MG/1; MG/1; MG/1
30 CAPSULE, EXTENDED RELEASE ORAL EVERY MORNING
Qty: 30 CAPSULE | Refills: 0 | Status: SHIPPED | OUTPATIENT
Start: 2021-03-08 | End: 2021-03-10 | Stop reason: SDUPTHER

## 2021-03-08 RX ORDER — DEXTROAMPHETAMINE SACCHARATE, AMPHETAMINE ASPARTATE MONOHYDRATE, DEXTROAMPHETAMINE SULFATE AND AMPHETAMINE SULFATE 7.5; 7.5; 7.5; 7.5 MG/1; MG/1; MG/1; MG/1
30 CAPSULE, EXTENDED RELEASE ORAL EVERY MORNING
Qty: 30 CAPSULE | Refills: 0 | Status: SHIPPED | OUTPATIENT
Start: 2021-05-07 | End: 2021-12-06

## 2021-03-08 RX ORDER — ALPRAZOLAM 0.5 MG/1
0.5 TABLET ORAL
Qty: 30 TABLET | Refills: 3 | Status: SHIPPED | OUTPATIENT
Start: 2021-03-08 | End: 2021-04-09 | Stop reason: SDUPTHER

## 2021-03-08 RX ORDER — DULOXETIN HYDROCHLORIDE 60 MG/1
60 CAPSULE, DELAYED RELEASE ORAL DAILY
Qty: 30 CAPSULE | Refills: 3 | Status: SHIPPED | OUTPATIENT
Start: 2021-03-08 | End: 2021-08-19 | Stop reason: SDUPTHER

## 2021-03-09 ENCOUNTER — PATIENT MESSAGE (OUTPATIENT)
Dept: PSYCHIATRY | Facility: CLINIC | Age: 51
End: 2021-03-09

## 2021-03-10 RX ORDER — DEXTROAMPHETAMINE SACCHARATE, AMPHETAMINE ASPARTATE MONOHYDRATE, DEXTROAMPHETAMINE SULFATE AND AMPHETAMINE SULFATE 7.5; 7.5; 7.5; 7.5 MG/1; MG/1; MG/1; MG/1
30 CAPSULE, EXTENDED RELEASE ORAL EVERY MORNING
Qty: 30 CAPSULE | Refills: 0 | Status: SHIPPED | OUTPATIENT
Start: 2021-03-10 | End: 2021-04-09 | Stop reason: SDUPTHER

## 2021-03-29 PROBLEM — R29.898 WEAKNESS OF BOTH HIPS: Status: RESOLVED | Noted: 2020-07-24 | Resolved: 2021-03-29

## 2021-03-29 PROBLEM — G89.29 CHRONIC PAIN OF BOTH KNEES: Status: RESOLVED | Noted: 2020-07-24 | Resolved: 2021-03-29

## 2021-03-29 PROBLEM — R26.9 GAIT ABNORMALITY: Status: RESOLVED | Noted: 2020-07-24 | Resolved: 2021-03-29

## 2021-03-29 PROBLEM — M25.562 CHRONIC PAIN OF BOTH KNEES: Status: RESOLVED | Noted: 2020-07-24 | Resolved: 2021-03-29

## 2021-03-29 PROBLEM — M25.561 CHRONIC PAIN OF BOTH KNEES: Status: RESOLVED | Noted: 2020-07-24 | Resolved: 2021-03-29

## 2021-03-29 PROBLEM — M25.361 INSTABILITY OF RIGHT KNEE JOINT: Status: RESOLVED | Noted: 2020-07-24 | Resolved: 2021-03-29

## 2021-04-09 RX ORDER — ALPRAZOLAM 0.5 MG/1
0.5 TABLET ORAL
Qty: 30 TABLET | Refills: 3 | Status: SHIPPED | OUTPATIENT
Start: 2021-04-09 | End: 2021-06-01 | Stop reason: SDUPTHER

## 2021-04-09 RX ORDER — QUETIAPINE FUMARATE 50 MG/1
50-100 TABLET, FILM COATED ORAL NIGHTLY
Qty: 60 TABLET | Refills: 11 | Status: SHIPPED | OUTPATIENT
Start: 2021-04-09 | End: 2021-06-01 | Stop reason: SDUPTHER

## 2021-04-09 RX ORDER — DEXTROAMPHETAMINE SACCHARATE, AMPHETAMINE ASPARTATE MONOHYDRATE, DEXTROAMPHETAMINE SULFATE AND AMPHETAMINE SULFATE 7.5; 7.5; 7.5; 7.5 MG/1; MG/1; MG/1; MG/1
30 CAPSULE, EXTENDED RELEASE ORAL EVERY MORNING
Qty: 30 CAPSULE | Refills: 0 | Status: SHIPPED | OUTPATIENT
Start: 2021-04-09 | End: 2021-06-01 | Stop reason: SDUPTHER

## 2021-04-14 ENCOUNTER — CLINICAL SUPPORT (OUTPATIENT)
Dept: URGENT CARE | Facility: CLINIC | Age: 51
End: 2021-04-14
Payer: COMMERCIAL

## 2021-04-14 DIAGNOSIS — Z20.822 EXPOSURE TO COVID-19 VIRUS: Primary | ICD-10-CM

## 2021-04-14 PROCEDURE — U0003 INFECTIOUS AGENT DETECTION BY NUCLEIC ACID (DNA OR RNA); SEVERE ACUTE RESPIRATORY SYNDROME CORONAVIRUS 2 (SARS-COV-2) (CORONAVIRUS DISEASE [COVID-19]), AMPLIFIED PROBE TECHNIQUE, MAKING USE OF HIGH THROUGHPUT TECHNOLOGIES AS DESCRIBED BY CMS-2020-01-R: HCPCS | Performed by: INTERNAL MEDICINE

## 2021-04-14 PROCEDURE — U0005 INFEC AGEN DETEC AMPLI PROBE: HCPCS | Performed by: INTERNAL MEDICINE

## 2021-04-15 LAB — SARS-COV-2 RNA RESP QL NAA+PROBE: NOT DETECTED

## 2021-05-12 RX ORDER — ALPRAZOLAM 0.5 MG/1
0.5 TABLET ORAL
Qty: 30 TABLET | Refills: 3 | Status: CANCELLED | OUTPATIENT
Start: 2021-05-12

## 2021-05-12 RX ORDER — QUETIAPINE FUMARATE 50 MG/1
50-100 TABLET, FILM COATED ORAL NIGHTLY
Qty: 60 TABLET | Refills: 11 | Status: CANCELLED | OUTPATIENT
Start: 2021-05-12 | End: 2022-05-12

## 2021-05-12 RX ORDER — DEXTROAMPHETAMINE SACCHARATE, AMPHETAMINE ASPARTATE MONOHYDRATE, DEXTROAMPHETAMINE SULFATE AND AMPHETAMINE SULFATE 7.5; 7.5; 7.5; 7.5 MG/1; MG/1; MG/1; MG/1
30 CAPSULE, EXTENDED RELEASE ORAL EVERY MORNING
Qty: 30 CAPSULE | Refills: 0 | Status: CANCELLED | OUTPATIENT
Start: 2021-05-12

## 2021-05-13 ENCOUNTER — PATIENT MESSAGE (OUTPATIENT)
Dept: PSYCHIATRY | Facility: CLINIC | Age: 51
End: 2021-05-13

## 2021-06-02 RX ORDER — QUETIAPINE FUMARATE 50 MG/1
50-100 TABLET, FILM COATED ORAL NIGHTLY
Qty: 60 TABLET | Refills: 11 | Status: SHIPPED | OUTPATIENT
Start: 2021-06-02 | End: 2021-09-09 | Stop reason: SDUPTHER

## 2021-06-02 RX ORDER — ALPRAZOLAM 0.5 MG/1
0.5 TABLET ORAL
Qty: 30 TABLET | Refills: 3 | Status: SHIPPED | OUTPATIENT
Start: 2021-06-02 | End: 2021-09-09 | Stop reason: SDUPTHER

## 2021-06-02 RX ORDER — DEXTROAMPHETAMINE SACCHARATE, AMPHETAMINE ASPARTATE MONOHYDRATE, DEXTROAMPHETAMINE SULFATE AND AMPHETAMINE SULFATE 7.5; 7.5; 7.5; 7.5 MG/1; MG/1; MG/1; MG/1
30 CAPSULE, EXTENDED RELEASE ORAL EVERY MORNING
Qty: 30 CAPSULE | Refills: 0 | Status: SHIPPED | OUTPATIENT
Start: 2021-06-02 | End: 2021-08-09 | Stop reason: SDUPTHER

## 2021-06-16 ENCOUNTER — OFFICE VISIT (OUTPATIENT)
Dept: PODIATRY | Facility: CLINIC | Age: 51
End: 2021-06-16
Payer: COMMERCIAL

## 2021-06-16 VITALS — WEIGHT: 293 LBS | HEIGHT: 66 IN | BODY MASS INDEX: 47.09 KG/M2

## 2021-06-16 DIAGNOSIS — E66.01 MORBID OBESITY: ICD-10-CM

## 2021-06-16 DIAGNOSIS — M20.42 HAMMER TOES OF BOTH FEET: Primary | ICD-10-CM

## 2021-06-16 DIAGNOSIS — G57.91 PERIPHERAL NEURITIS OF RIGHT FOOT: ICD-10-CM

## 2021-06-16 DIAGNOSIS — M20.41 HAMMER TOES OF BOTH FEET: Primary | ICD-10-CM

## 2021-06-16 DIAGNOSIS — M19.072 OSTEOARTHRITIS OF LEFT ANKLE OR FOOT: ICD-10-CM

## 2021-06-16 DIAGNOSIS — M79.675 PAIN IN TOES OF BOTH FEET: ICD-10-CM

## 2021-06-16 DIAGNOSIS — Z01.818 PREOP EXAMINATION: ICD-10-CM

## 2021-06-16 DIAGNOSIS — M79.674 PAIN IN TOES OF BOTH FEET: ICD-10-CM

## 2021-06-16 DIAGNOSIS — Z72.0 TOBACCO ABUSE: ICD-10-CM

## 2021-06-16 DIAGNOSIS — G57.92 PERIPHERAL NEURITIS OF LEFT FOOT: ICD-10-CM

## 2021-06-16 DIAGNOSIS — M19.071 OSTEOARTHRITIS OF RIGHT ANKLE OR FOOT: ICD-10-CM

## 2021-06-16 PROCEDURE — 99204 PR OFFICE/OUTPT VISIT, NEW, LEVL IV, 45-59 MIN: ICD-10-PCS | Mod: S$GLB,,, | Performed by: PODIATRIST

## 2021-06-16 PROCEDURE — 3008F BODY MASS INDEX DOCD: CPT | Mod: CPTII,S$GLB,, | Performed by: PODIATRIST

## 2021-06-16 PROCEDURE — 1125F AMNT PAIN NOTED PAIN PRSNT: CPT | Mod: S$GLB,,, | Performed by: PODIATRIST

## 2021-06-16 PROCEDURE — 3008F PR BODY MASS INDEX (BMI) DOCUMENTED: ICD-10-PCS | Mod: CPTII,S$GLB,, | Performed by: PODIATRIST

## 2021-06-16 PROCEDURE — 99999 PR PBB SHADOW E&M-EST. PATIENT-LVL V: CPT | Mod: PBBFAC,,, | Performed by: PODIATRIST

## 2021-06-16 PROCEDURE — 99999 PR PBB SHADOW E&M-EST. PATIENT-LVL V: ICD-10-PCS | Mod: PBBFAC,,, | Performed by: PODIATRIST

## 2021-06-16 PROCEDURE — 99204 OFFICE O/P NEW MOD 45 MIN: CPT | Mod: S$GLB,,, | Performed by: PODIATRIST

## 2021-06-16 PROCEDURE — 1125F PR PAIN SEVERITY QUANTIFIED, PAIN PRESENT: ICD-10-PCS | Mod: S$GLB,,, | Performed by: PODIATRIST

## 2021-06-16 RX ORDER — MONTELUKAST SODIUM 10 MG/1
10 TABLET ORAL DAILY
COMMUNITY
Start: 2021-05-19 | End: 2021-10-06 | Stop reason: SDUPTHER

## 2021-06-16 RX ORDER — ZOLPIDEM TARTRATE 6.25 MG/1
6.25 TABLET, FILM COATED, EXTENDED RELEASE ORAL NIGHTLY PRN
COMMUNITY
Start: 2021-05-12 | End: 2021-12-06

## 2021-06-16 RX ORDER — NABUMETONE 750 MG/1
750 TABLET, FILM COATED ORAL 2 TIMES DAILY
Qty: 60 TABLET | Refills: 1 | Status: SHIPPED | OUTPATIENT
Start: 2021-06-16 | End: 2021-07-16

## 2021-06-16 RX ORDER — MELOXICAM 15 MG/1
15 TABLET ORAL DAILY
COMMUNITY
Start: 2021-05-12 | End: 2021-06-16

## 2021-06-28 ENCOUNTER — TELEPHONE (OUTPATIENT)
Dept: SMOKING CESSATION | Facility: CLINIC | Age: 51
End: 2021-06-28

## 2021-07-06 ENCOUNTER — TELEPHONE (OUTPATIENT)
Dept: SMOKING CESSATION | Facility: CLINIC | Age: 51
End: 2021-07-06

## 2021-07-08 ENCOUNTER — TELEPHONE (OUTPATIENT)
Dept: SMOKING CESSATION | Facility: CLINIC | Age: 51
End: 2021-07-08

## 2021-08-06 ENCOUNTER — PATIENT MESSAGE (OUTPATIENT)
Dept: PSYCHIATRY | Facility: CLINIC | Age: 51
End: 2021-08-06

## 2021-08-11 DIAGNOSIS — M17.9 OA (OSTEOARTHRITIS) OF KNEE: Primary | ICD-10-CM

## 2021-08-11 RX ORDER — DEXTROAMPHETAMINE SACCHARATE, AMPHETAMINE ASPARTATE MONOHYDRATE, DEXTROAMPHETAMINE SULFATE AND AMPHETAMINE SULFATE 7.5; 7.5; 7.5; 7.5 MG/1; MG/1; MG/1; MG/1
30 CAPSULE, EXTENDED RELEASE ORAL EVERY MORNING
Qty: 30 CAPSULE | Refills: 0 | Status: CANCELLED | OUTPATIENT
Start: 2021-08-11

## 2021-08-11 RX ORDER — DEXTROAMPHETAMINE SACCHARATE, AMPHETAMINE ASPARTATE MONOHYDRATE, DEXTROAMPHETAMINE SULFATE AND AMPHETAMINE SULFATE 7.5; 7.5; 7.5; 7.5 MG/1; MG/1; MG/1; MG/1
30 CAPSULE, EXTENDED RELEASE ORAL EVERY MORNING
Qty: 30 CAPSULE | Refills: 0 | Status: SHIPPED | OUTPATIENT
Start: 2021-08-11 | End: 2021-12-06

## 2021-08-12 RX ORDER — DEXTROAMPHETAMINE SACCHARATE, AMPHETAMINE ASPARTATE MONOHYDRATE, DEXTROAMPHETAMINE SULFATE AND AMPHETAMINE SULFATE 7.5; 7.5; 7.5; 7.5 MG/1; MG/1; MG/1; MG/1
30 CAPSULE, EXTENDED RELEASE ORAL EVERY MORNING
Qty: 30 CAPSULE | Refills: 0 | Status: SHIPPED | OUTPATIENT
Start: 2021-08-12 | End: 2021-10-12 | Stop reason: SDUPTHER

## 2021-08-17 DIAGNOSIS — R09.81 SINUS CONGESTION: ICD-10-CM

## 2021-08-17 DIAGNOSIS — R11.0 NAUSEA: Primary | ICD-10-CM

## 2021-08-24 ENCOUNTER — PATIENT MESSAGE (OUTPATIENT)
Dept: PSYCHIATRY | Facility: CLINIC | Age: 51
End: 2021-08-24

## 2021-08-24 RX ORDER — DULOXETIN HYDROCHLORIDE 60 MG/1
60 CAPSULE, DELAYED RELEASE ORAL DAILY
Qty: 30 CAPSULE | Refills: 3 | Status: SHIPPED | OUTPATIENT
Start: 2021-08-24 | End: 2021-09-09 | Stop reason: SDUPTHER

## 2021-09-15 ENCOUNTER — TELEPHONE (OUTPATIENT)
Dept: INTERNAL MEDICINE | Facility: CLINIC | Age: 51
End: 2021-09-15

## 2021-09-15 DIAGNOSIS — T78.40XA ALLERGY, INITIAL ENCOUNTER: Primary | ICD-10-CM

## 2021-09-16 ENCOUNTER — OFFICE VISIT (OUTPATIENT)
Dept: ALLERGY | Facility: CLINIC | Age: 51
End: 2021-09-16
Payer: COMMERCIAL

## 2021-09-16 VITALS
HEART RATE: 81 BPM | WEIGHT: 293 LBS | DIASTOLIC BLOOD PRESSURE: 79 MMHG | HEIGHT: 66 IN | BODY MASS INDEX: 47.09 KG/M2 | SYSTOLIC BLOOD PRESSURE: 127 MMHG | TEMPERATURE: 98 F

## 2021-09-16 DIAGNOSIS — Z91.018 FOOD ALLERGY: Primary | ICD-10-CM

## 2021-09-16 DIAGNOSIS — T78.40XA ALLERGY, INITIAL ENCOUNTER: ICD-10-CM

## 2021-09-16 DIAGNOSIS — J30.1 SEASONAL ALLERGIC RHINITIS DUE TO POLLEN: ICD-10-CM

## 2021-09-16 DIAGNOSIS — L23.4 ALLERGIC CONTACT DERMATITIS DUE TO DYES: ICD-10-CM

## 2021-09-16 PROCEDURE — 3078F DIAST BP <80 MM HG: CPT | Mod: CPTII,S$GLB,, | Performed by: ALLERGY & IMMUNOLOGY

## 2021-09-16 PROCEDURE — 3074F SYST BP LT 130 MM HG: CPT | Mod: CPTII,S$GLB,, | Performed by: ALLERGY & IMMUNOLOGY

## 2021-09-16 PROCEDURE — 95004 PR ALLERGY SKIN TESTS,ALLERGENS: ICD-10-PCS | Mod: S$GLB,,, | Performed by: ALLERGY & IMMUNOLOGY

## 2021-09-16 PROCEDURE — 3074F PR MOST RECENT SYSTOLIC BLOOD PRESSURE < 130 MM HG: ICD-10-PCS | Mod: CPTII,S$GLB,, | Performed by: ALLERGY & IMMUNOLOGY

## 2021-09-16 PROCEDURE — 95004 PERQ TESTS W/ALRGNC XTRCS: CPT | Mod: S$GLB,,, | Performed by: ALLERGY & IMMUNOLOGY

## 2021-09-16 PROCEDURE — 99204 OFFICE O/P NEW MOD 45 MIN: CPT | Mod: 25,S$GLB,, | Performed by: ALLERGY & IMMUNOLOGY

## 2021-09-16 PROCEDURE — 3008F BODY MASS INDEX DOCD: CPT | Mod: CPTII,S$GLB,, | Performed by: ALLERGY & IMMUNOLOGY

## 2021-09-16 PROCEDURE — 3008F PR BODY MASS INDEX (BMI) DOCUMENTED: ICD-10-PCS | Mod: CPTII,S$GLB,, | Performed by: ALLERGY & IMMUNOLOGY

## 2021-09-16 PROCEDURE — 99204 PR OFFICE/OUTPT VISIT, NEW, LEVL IV, 45-59 MIN: ICD-10-PCS | Mod: 25,S$GLB,, | Performed by: ALLERGY & IMMUNOLOGY

## 2021-09-16 PROCEDURE — 3078F PR MOST RECENT DIASTOLIC BLOOD PRESSURE < 80 MM HG: ICD-10-PCS | Mod: CPTII,S$GLB,, | Performed by: ALLERGY & IMMUNOLOGY

## 2021-09-16 PROCEDURE — 99999 PR PBB SHADOW E&M-EST. PATIENT-LVL V: ICD-10-PCS | Mod: PBBFAC,,, | Performed by: ALLERGY & IMMUNOLOGY

## 2021-09-16 PROCEDURE — 1159F MED LIST DOCD IN RCRD: CPT | Mod: CPTII,S$GLB,, | Performed by: ALLERGY & IMMUNOLOGY

## 2021-09-16 PROCEDURE — 99999 PR PBB SHADOW E&M-EST. PATIENT-LVL V: CPT | Mod: PBBFAC,,, | Performed by: ALLERGY & IMMUNOLOGY

## 2021-09-16 PROCEDURE — 1159F PR MEDICATION LIST DOCUMENTED IN MEDICAL RECORD: ICD-10-PCS | Mod: CPTII,S$GLB,, | Performed by: ALLERGY & IMMUNOLOGY

## 2021-09-16 RX ORDER — FLUTICASONE PROPIONATE AND SALMETEROL XINAFOATE 230; 21 UG/1; UG/1
2 AEROSOL, METERED RESPIRATORY (INHALATION) 2 TIMES DAILY
COMMUNITY
Start: 2021-06-24 | End: 2021-10-13

## 2021-09-16 RX ORDER — DICLOFENAC SODIUM 10 MG/G
GEL TOPICAL
Status: ON HOLD | COMMUNITY
Start: 2021-06-14 | End: 2022-10-24 | Stop reason: HOSPADM

## 2021-09-16 RX ORDER — IPRATROPIUM BROMIDE AND ALBUTEROL SULFATE 2.5; .5 MG/3ML; MG/3ML
SOLUTION RESPIRATORY (INHALATION)
COMMUNITY
Start: 2020-10-20

## 2021-09-16 RX ORDER — OXYBUTYNIN CHLORIDE 10 MG/1
1 TABLET, EXTENDED RELEASE ORAL DAILY
COMMUNITY
End: 2021-09-29 | Stop reason: SDUPTHER

## 2021-09-16 RX ORDER — CHOLESTYRAMINE 4 G/9G
1 POWDER, FOR SUSPENSION ORAL DAILY
COMMUNITY
Start: 2021-06-16 | End: 2021-12-16

## 2021-09-16 RX ORDER — AZELASTINE 1 MG/ML
1 SPRAY, METERED NASAL 2 TIMES DAILY
Qty: 30 ML | Refills: 5 | Status: SHIPPED | OUTPATIENT
Start: 2021-09-16 | End: 2021-10-06 | Stop reason: SDUPTHER

## 2021-09-16 RX ORDER — IBUPROFEN 200 MG
TABLET ORAL
COMMUNITY
Start: 2020-10-06 | End: 2023-08-30

## 2021-09-16 RX ORDER — PANTOPRAZOLE SODIUM 40 MG/1
TABLET, DELAYED RELEASE ORAL
COMMUNITY
End: 2021-12-16

## 2021-09-16 RX ORDER — EPINEPHRINE 0.3 MG/.3ML
1 INJECTION SUBCUTANEOUS ONCE
Qty: 2 EACH | Refills: 3 | Status: SHIPPED | OUTPATIENT
Start: 2021-09-16 | End: 2022-09-21 | Stop reason: SDUPTHER

## 2021-09-16 RX ORDER — DICYCLOMINE HYDROCHLORIDE 10 MG/1
CAPSULE ORAL
COMMUNITY
Start: 2021-03-17 | End: 2021-12-16 | Stop reason: SDUPTHER

## 2021-09-16 RX ORDER — IBUPROFEN AND FAMOTIDINE 800; 26.6 MG/1; MG/1
1 TABLET, COATED ORAL 3 TIMES DAILY
COMMUNITY
Start: 2021-07-09 | End: 2021-12-06

## 2021-09-16 RX ORDER — FLUTICASONE PROPIONATE 50 MCG
SPRAY, SUSPENSION (ML) NASAL
COMMUNITY
Start: 2021-08-07

## 2021-09-17 ENCOUNTER — PATIENT MESSAGE (OUTPATIENT)
Dept: ALLERGY | Facility: CLINIC | Age: 51
End: 2021-09-17

## 2021-09-27 ENCOUNTER — PATIENT MESSAGE (OUTPATIENT)
Dept: PHARMACY | Facility: CLINIC | Age: 51
End: 2021-09-27

## 2021-09-27 ENCOUNTER — DOCUMENTATION ONLY (OUTPATIENT)
Dept: PSYCHIATRY | Facility: CLINIC | Age: 51
End: 2021-09-27

## 2021-09-27 RX ORDER — QUETIAPINE FUMARATE 50 MG/1
50-100 TABLET, FILM COATED ORAL NIGHTLY
Qty: 60 TABLET | Refills: 0 | Status: SHIPPED | OUTPATIENT
Start: 2021-09-27 | End: 2021-12-06 | Stop reason: SDUPTHER

## 2021-09-27 RX ORDER — DULOXETIN HYDROCHLORIDE 60 MG/1
60 CAPSULE, DELAYED RELEASE ORAL DAILY
Qty: 30 CAPSULE | Refills: 0 | Status: SHIPPED | OUTPATIENT
Start: 2021-09-27 | End: 2021-12-06 | Stop reason: SDUPTHER

## 2021-09-27 RX ORDER — DEXTROAMPHETAMINE SACCHARATE, AMPHETAMINE ASPARTATE MONOHYDRATE, DEXTROAMPHETAMINE SULFATE AND AMPHETAMINE SULFATE 7.5; 7.5; 7.5; 7.5 MG/1; MG/1; MG/1; MG/1
30 CAPSULE, EXTENDED RELEASE ORAL EVERY MORNING
Qty: 30 CAPSULE | Refills: 0 | OUTPATIENT
Start: 2021-09-27

## 2021-09-27 RX ORDER — ALPRAZOLAM 0.5 MG/1
0.5 TABLET ORAL
Qty: 30 TABLET | Refills: 0 | Status: SHIPPED | OUTPATIENT
Start: 2021-09-27 | End: 2021-12-06 | Stop reason: SDUPTHER

## 2021-09-29 ENCOUNTER — OFFICE VISIT (OUTPATIENT)
Dept: ALLERGY | Facility: CLINIC | Age: 51
End: 2021-09-29
Payer: COMMERCIAL

## 2021-09-29 VITALS
BODY MASS INDEX: 47.09 KG/M2 | HEIGHT: 66 IN | DIASTOLIC BLOOD PRESSURE: 75 MMHG | SYSTOLIC BLOOD PRESSURE: 116 MMHG | WEIGHT: 293 LBS | HEART RATE: 82 BPM | TEMPERATURE: 97 F

## 2021-09-29 DIAGNOSIS — J30.1 SEASONAL ALLERGIC RHINITIS DUE TO POLLEN: ICD-10-CM

## 2021-09-29 DIAGNOSIS — Z91.018 FOOD ALLERGY: Primary | ICD-10-CM

## 2021-09-29 DIAGNOSIS — Z72.0 TOBACCO ABUSE: ICD-10-CM

## 2021-09-29 PROCEDURE — 99214 PR OFFICE/OUTPT VISIT, EST, LEVL IV, 30-39 MIN: ICD-10-PCS | Mod: 25,S$GLB,, | Performed by: ALLERGY & IMMUNOLOGY

## 2021-09-29 PROCEDURE — 1159F MED LIST DOCD IN RCRD: CPT | Mod: CPTII,S$GLB,, | Performed by: ALLERGY & IMMUNOLOGY

## 2021-09-29 PROCEDURE — 3008F PR BODY MASS INDEX (BMI) DOCUMENTED: ICD-10-PCS | Mod: CPTII,S$GLB,, | Performed by: ALLERGY & IMMUNOLOGY

## 2021-09-29 PROCEDURE — 3008F BODY MASS INDEX DOCD: CPT | Mod: CPTII,S$GLB,, | Performed by: ALLERGY & IMMUNOLOGY

## 2021-09-29 PROCEDURE — 3074F PR MOST RECENT SYSTOLIC BLOOD PRESSURE < 130 MM HG: ICD-10-PCS | Mod: CPTII,S$GLB,, | Performed by: ALLERGY & IMMUNOLOGY

## 2021-09-29 PROCEDURE — 99214 OFFICE O/P EST MOD 30 MIN: CPT | Mod: 25,S$GLB,, | Performed by: ALLERGY & IMMUNOLOGY

## 2021-09-29 PROCEDURE — 99999 PR PBB SHADOW E&M-EST. PATIENT-LVL IV: ICD-10-PCS | Mod: PBBFAC,,, | Performed by: ALLERGY & IMMUNOLOGY

## 2021-09-29 PROCEDURE — 3078F PR MOST RECENT DIASTOLIC BLOOD PRESSURE < 80 MM HG: ICD-10-PCS | Mod: CPTII,S$GLB,, | Performed by: ALLERGY & IMMUNOLOGY

## 2021-09-29 PROCEDURE — 3078F DIAST BP <80 MM HG: CPT | Mod: CPTII,S$GLB,, | Performed by: ALLERGY & IMMUNOLOGY

## 2021-09-29 PROCEDURE — 1159F PR MEDICATION LIST DOCUMENTED IN MEDICAL RECORD: ICD-10-PCS | Mod: CPTII,S$GLB,, | Performed by: ALLERGY & IMMUNOLOGY

## 2021-09-29 PROCEDURE — 96372 THER/PROPH/DIAG INJ SC/IM: CPT | Mod: S$GLB,,, | Performed by: ALLERGY & IMMUNOLOGY

## 2021-09-29 PROCEDURE — 96372 PR INJECTION,THERAP/PROPH/DIAG2ST, IM OR SUBCUT: ICD-10-PCS | Mod: S$GLB,,, | Performed by: ALLERGY & IMMUNOLOGY

## 2021-09-29 PROCEDURE — 99999 PR PBB SHADOW E&M-EST. PATIENT-LVL IV: CPT | Mod: PBBFAC,,, | Performed by: ALLERGY & IMMUNOLOGY

## 2021-09-29 PROCEDURE — 3074F SYST BP LT 130 MM HG: CPT | Mod: CPTII,S$GLB,, | Performed by: ALLERGY & IMMUNOLOGY

## 2021-09-29 RX ORDER — OXYBUTYNIN CHLORIDE 5 MG/1
5 TABLET, EXTENDED RELEASE ORAL DAILY
COMMUNITY
Start: 2021-09-23 | End: 2023-05-19 | Stop reason: DRUGHIGH

## 2021-09-29 RX ORDER — METHYLPREDNISOLONE ACETATE 40 MG/ML
40 INJECTION, SUSPENSION INTRA-ARTICULAR; INTRALESIONAL; INTRAMUSCULAR; SOFT TISSUE
Status: COMPLETED | OUTPATIENT
Start: 2021-09-29 | End: 2021-09-29

## 2021-09-29 RX ORDER — GABAPENTIN 300 MG/1
300 CAPSULE ORAL 3 TIMES DAILY PRN
COMMUNITY
Start: 2021-09-28 | End: 2021-12-16

## 2021-09-29 RX ADMIN — METHYLPREDNISOLONE ACETATE 40 MG: 40 INJECTION, SUSPENSION INTRA-ARTICULAR; INTRALESIONAL; INTRAMUSCULAR; SOFT TISSUE at 10:09

## 2021-10-06 ENCOUNTER — OFFICE VISIT (OUTPATIENT)
Dept: ALLERGY | Facility: CLINIC | Age: 51
End: 2021-10-06
Payer: COMMERCIAL

## 2021-10-06 VITALS
SYSTOLIC BLOOD PRESSURE: 153 MMHG | TEMPERATURE: 98 F | HEIGHT: 66 IN | DIASTOLIC BLOOD PRESSURE: 96 MMHG | BODY MASS INDEX: 47.09 KG/M2 | WEIGHT: 293 LBS

## 2021-10-06 DIAGNOSIS — R05.9 COUGH: Primary | ICD-10-CM

## 2021-10-06 DIAGNOSIS — J30.1 SEASONAL ALLERGIC RHINITIS DUE TO POLLEN: ICD-10-CM

## 2021-10-06 DIAGNOSIS — J45.40 MODERATE PERSISTENT ASTHMA WITHOUT COMPLICATION: ICD-10-CM

## 2021-10-06 DIAGNOSIS — Z91.018 FOOD ALLERGY: ICD-10-CM

## 2021-10-06 DIAGNOSIS — J30.1 NON-SEASONAL ALLERGIC RHINITIS DUE TO POLLEN: ICD-10-CM

## 2021-10-06 DIAGNOSIS — Z72.0 TOBACCO ABUSE: ICD-10-CM

## 2021-10-06 PROCEDURE — 3008F PR BODY MASS INDEX (BMI) DOCUMENTED: ICD-10-PCS | Mod: CPTII,S$GLB,, | Performed by: ALLERGY & IMMUNOLOGY

## 2021-10-06 PROCEDURE — 99999 PR PBB SHADOW E&M-EST. PATIENT-LVL IV: ICD-10-PCS | Mod: PBBFAC,,, | Performed by: ALLERGY & IMMUNOLOGY

## 2021-10-06 PROCEDURE — 3077F PR MOST RECENT SYSTOLIC BLOOD PRESSURE >= 140 MM HG: ICD-10-PCS | Mod: CPTII,S$GLB,, | Performed by: ALLERGY & IMMUNOLOGY

## 2021-10-06 PROCEDURE — 1159F MED LIST DOCD IN RCRD: CPT | Mod: CPTII,S$GLB,, | Performed by: ALLERGY & IMMUNOLOGY

## 2021-10-06 PROCEDURE — 3077F SYST BP >= 140 MM HG: CPT | Mod: CPTII,S$GLB,, | Performed by: ALLERGY & IMMUNOLOGY

## 2021-10-06 PROCEDURE — 1159F PR MEDICATION LIST DOCUMENTED IN MEDICAL RECORD: ICD-10-PCS | Mod: CPTII,S$GLB,, | Performed by: ALLERGY & IMMUNOLOGY

## 2021-10-06 PROCEDURE — 3008F BODY MASS INDEX DOCD: CPT | Mod: CPTII,S$GLB,, | Performed by: ALLERGY & IMMUNOLOGY

## 2021-10-06 PROCEDURE — 3080F DIAST BP >= 90 MM HG: CPT | Mod: CPTII,S$GLB,, | Performed by: ALLERGY & IMMUNOLOGY

## 2021-10-06 PROCEDURE — 3080F PR MOST RECENT DIASTOLIC BLOOD PRESSURE >= 90 MM HG: ICD-10-PCS | Mod: CPTII,S$GLB,, | Performed by: ALLERGY & IMMUNOLOGY

## 2021-10-06 PROCEDURE — 99999 PR PBB SHADOW E&M-EST. PATIENT-LVL IV: CPT | Mod: PBBFAC,,, | Performed by: ALLERGY & IMMUNOLOGY

## 2021-10-06 PROCEDURE — 99214 OFFICE O/P EST MOD 30 MIN: CPT | Mod: S$GLB,,, | Performed by: ALLERGY & IMMUNOLOGY

## 2021-10-06 PROCEDURE — 99214 PR OFFICE/OUTPT VISIT, EST, LEVL IV, 30-39 MIN: ICD-10-PCS | Mod: S$GLB,,, | Performed by: ALLERGY & IMMUNOLOGY

## 2021-10-06 RX ORDER — MONTELUKAST SODIUM 10 MG/1
10 TABLET ORAL DAILY
Qty: 30 TABLET | Refills: 5 | Status: SHIPPED | OUTPATIENT
Start: 2021-10-06

## 2021-10-06 RX ORDER — AZELASTINE 1 MG/ML
1 SPRAY, METERED NASAL 2 TIMES DAILY
Qty: 30 ML | Refills: 5 | Status: SHIPPED | OUTPATIENT
Start: 2021-10-06 | End: 2022-10-06

## 2021-10-06 RX ORDER — FLUTICASONE FUROATE AND VILANTEROL 200; 25 UG/1; UG/1
1 POWDER RESPIRATORY (INHALATION) DAILY
Qty: 1 EACH | Refills: 3 | Status: SHIPPED | OUTPATIENT
Start: 2021-10-06 | End: 2021-10-06 | Stop reason: SDUPTHER

## 2021-10-06 RX ORDER — METAXALONE 800 MG/1
800 TABLET ORAL 2 TIMES DAILY PRN
COMMUNITY
Start: 2021-09-29 | End: 2021-12-16

## 2021-10-06 RX ORDER — FLUTICASONE FUROATE AND VILANTEROL 200; 25 UG/1; UG/1
1 POWDER RESPIRATORY (INHALATION) DAILY
Qty: 1 EACH | Refills: 3 | Status: SHIPPED | OUTPATIENT
Start: 2021-10-06 | End: 2021-12-06

## 2021-10-06 RX ORDER — LEVOCETIRIZINE DIHYDROCHLORIDE 5 MG/1
5 TABLET, FILM COATED ORAL NIGHTLY
Qty: 30 TABLET | Refills: 11 | Status: SHIPPED | OUTPATIENT
Start: 2021-10-06 | End: 2022-04-04 | Stop reason: SDUPTHER

## 2021-10-12 RX ORDER — DEXTROAMPHETAMINE SACCHARATE, AMPHETAMINE ASPARTATE MONOHYDRATE, DEXTROAMPHETAMINE SULFATE AND AMPHETAMINE SULFATE 7.5; 7.5; 7.5; 7.5 MG/1; MG/1; MG/1; MG/1
30 CAPSULE, EXTENDED RELEASE ORAL EVERY MORNING
Qty: 14 CAPSULE | Refills: 0 | Status: SHIPPED | OUTPATIENT
Start: 2021-10-12 | End: 2021-10-26

## 2021-10-13 ENCOUNTER — TELEPHONE (OUTPATIENT)
Dept: ALLERGY | Facility: CLINIC | Age: 51
End: 2021-10-13

## 2021-10-13 RX ORDER — FLUTICASONE PROPIONATE AND SALMETEROL 500; 50 UG/1; UG/1
1 POWDER RESPIRATORY (INHALATION) 2 TIMES DAILY
Qty: 60 EACH | Refills: 11 | Status: SHIPPED | OUTPATIENT
Start: 2021-10-13 | End: 2021-12-06

## 2021-10-25 ENCOUNTER — PATIENT MESSAGE (OUTPATIENT)
Dept: PHARMACY | Facility: CLINIC | Age: 51
End: 2021-10-25
Payer: COMMERCIAL

## 2021-10-27 ENCOUNTER — PATIENT MESSAGE (OUTPATIENT)
Dept: PSYCHIATRY | Facility: CLINIC | Age: 51
End: 2021-10-27

## 2021-10-29 ENCOUNTER — PATIENT MESSAGE (OUTPATIENT)
Dept: ALLERGY | Facility: CLINIC | Age: 51
End: 2021-10-29
Payer: COMMERCIAL

## 2021-10-29 ENCOUNTER — PATIENT MESSAGE (OUTPATIENT)
Dept: PODIATRY | Facility: CLINIC | Age: 51
End: 2021-10-29
Payer: COMMERCIAL

## 2021-10-29 DIAGNOSIS — M20.41 HAMMER TOES OF BOTH FEET: Primary | ICD-10-CM

## 2021-10-29 DIAGNOSIS — M20.42 HAMMER TOES OF BOTH FEET: Primary | ICD-10-CM

## 2021-10-29 RX ORDER — DEXTROAMPHETAMINE SACCHARATE, AMPHETAMINE ASPARTATE MONOHYDRATE, DEXTROAMPHETAMINE SULFATE AND AMPHETAMINE SULFATE 7.5; 7.5; 7.5; 7.5 MG/1; MG/1; MG/1; MG/1
30 CAPSULE, EXTENDED RELEASE ORAL EVERY MORNING
Qty: 30 CAPSULE | Refills: 0 | OUTPATIENT
Start: 2021-10-29

## 2021-11-01 RX ORDER — ALBUTEROL SULFATE 90 UG/1
2 AEROSOL, METERED RESPIRATORY (INHALATION) EVERY 4 HOURS PRN
Qty: 12 G | Refills: 0 | OUTPATIENT
Start: 2021-11-01 | End: 2022-11-01

## 2021-11-04 RX ORDER — DEXTROAMPHETAMINE SACCHARATE, AMPHETAMINE ASPARTATE MONOHYDRATE, DEXTROAMPHETAMINE SULFATE AND AMPHETAMINE SULFATE 7.5; 7.5; 7.5; 7.5 MG/1; MG/1; MG/1; MG/1
30 CAPSULE, EXTENDED RELEASE ORAL EVERY MORNING
Qty: 30 CAPSULE | Refills: 0 | OUTPATIENT
Start: 2021-11-04

## 2021-11-24 RX ORDER — ALPRAZOLAM 0.5 MG/1
0.5 TABLET ORAL
Qty: 30 TABLET | Refills: 0 | Status: CANCELLED | OUTPATIENT
Start: 2021-11-24

## 2021-11-26 RX ORDER — METHYLPREDNISOLONE 4 MG/1
TABLET ORAL
Qty: 21 EACH | Refills: 0 | Status: SHIPPED | OUTPATIENT
Start: 2021-11-26 | End: 2021-12-06

## 2021-11-26 RX ORDER — PROMETHAZINE HYDROCHLORIDE AND PHENYLEPHRINE HYDROCHLORIDE 6.25; 5 MG/5ML; MG/5ML
5 SYRUP ORAL EVERY 6 HOURS PRN
Qty: 118 ML | Refills: 0 | Status: SHIPPED | OUTPATIENT
Start: 2021-11-26 | End: 2022-01-04

## 2021-11-26 RX ORDER — PROMETHAZINE HYDROCHLORIDE AND PHENYLEPHRINE HYDROCHLORIDE 6.25; 5 MG/5ML; MG/5ML
5 SYRUP ORAL EVERY 6 HOURS PRN
Qty: 118 ML | Refills: 0 | Status: SHIPPED | OUTPATIENT
Start: 2021-11-26 | End: 2021-11-26 | Stop reason: SDUPTHER

## 2021-12-06 ENCOUNTER — OFFICE VISIT (OUTPATIENT)
Dept: PSYCHIATRY | Facility: CLINIC | Age: 51
End: 2021-12-06
Payer: COMMERCIAL

## 2021-12-06 DIAGNOSIS — F90.0 ADHD, PREDOMINANTLY INATTENTIVE TYPE: Primary | ICD-10-CM

## 2021-12-06 DIAGNOSIS — F41.1 GENERALIZED ANXIETY DISORDER: ICD-10-CM

## 2021-12-06 PROCEDURE — 99215 PR OFFICE/OUTPT VISIT, EST, LEVL V, 40-54 MIN: ICD-10-PCS | Mod: 95,,, | Performed by: PSYCHOLOGIST

## 2021-12-06 PROCEDURE — 99215 OFFICE O/P EST HI 40 MIN: CPT | Mod: 95,,, | Performed by: PSYCHOLOGIST

## 2021-12-06 RX ORDER — DEXTROAMPHETAMINE SACCHARATE, AMPHETAMINE ASPARTATE, DEXTROAMPHETAMINE SULFATE AND AMPHETAMINE SULFATE 3.75; 3.75; 3.75; 3.75 MG/1; MG/1; MG/1; MG/1
15 TABLET ORAL DAILY
Qty: 30 TABLET | Refills: 0 | Status: SHIPPED | OUTPATIENT
Start: 2022-02-04 | End: 2023-08-16 | Stop reason: SDUPTHER

## 2021-12-06 RX ORDER — DEXTROAMPHETAMINE SACCHARATE, AMPHETAMINE ASPARTATE MONOHYDRATE, DEXTROAMPHETAMINE SULFATE AND AMPHETAMINE SULFATE 7.5; 7.5; 7.5; 7.5 MG/1; MG/1; MG/1; MG/1
30 CAPSULE, EXTENDED RELEASE ORAL EVERY MORNING
Qty: 30 CAPSULE | Refills: 0 | Status: SHIPPED | OUTPATIENT
Start: 2022-01-05 | End: 2021-12-16 | Stop reason: SDUPTHER

## 2021-12-06 RX ORDER — DEXTROAMPHETAMINE SACCHARATE, AMPHETAMINE ASPARTATE, DEXTROAMPHETAMINE SULFATE AND AMPHETAMINE SULFATE 3.75; 3.75; 3.75; 3.75 MG/1; MG/1; MG/1; MG/1
15 TABLET ORAL DAILY
Qty: 30 TABLET | Refills: 0 | Status: SHIPPED | OUTPATIENT
Start: 2021-12-06 | End: 2021-12-16 | Stop reason: SDUPTHER

## 2021-12-06 RX ORDER — DEXTROAMPHETAMINE SACCHARATE, AMPHETAMINE ASPARTATE MONOHYDRATE, DEXTROAMPHETAMINE SULFATE AND AMPHETAMINE SULFATE 7.5; 7.5; 7.5; 7.5 MG/1; MG/1; MG/1; MG/1
30 CAPSULE, EXTENDED RELEASE ORAL EVERY MORNING
Qty: 30 CAPSULE | Refills: 0 | Status: SHIPPED | OUTPATIENT
Start: 2022-02-04 | End: 2022-03-06

## 2021-12-06 RX ORDER — MELOXICAM 15 MG/1
1 TABLET ORAL DAILY
COMMUNITY
End: 2021-12-16

## 2021-12-06 RX ORDER — ALPRAZOLAM 0.5 MG/1
0.5 TABLET ORAL
Qty: 30 TABLET | Refills: 2 | Status: SHIPPED | OUTPATIENT
Start: 2021-12-06 | End: 2022-04-04 | Stop reason: SDUPTHER

## 2021-12-06 RX ORDER — DEXTROAMPHETAMINE SACCHARATE, AMPHETAMINE ASPARTATE MONOHYDRATE, DEXTROAMPHETAMINE SULFATE AND AMPHETAMINE SULFATE 7.5; 7.5; 7.5; 7.5 MG/1; MG/1; MG/1; MG/1
30 CAPSULE, EXTENDED RELEASE ORAL EVERY MORNING
Qty: 30 CAPSULE | Refills: 0 | Status: SHIPPED | OUTPATIENT
Start: 2021-12-06 | End: 2021-12-16 | Stop reason: SDUPTHER

## 2021-12-06 RX ORDER — DEXTROAMPHETAMINE SACCHARATE, AMPHETAMINE ASPARTATE, DEXTROAMPHETAMINE SULFATE AND AMPHETAMINE SULFATE 3.75; 3.75; 3.75; 3.75 MG/1; MG/1; MG/1; MG/1
15 TABLET ORAL DAILY
Qty: 30 TABLET | Refills: 0 | Status: SHIPPED | OUTPATIENT
Start: 2022-01-05 | End: 2021-12-16 | Stop reason: SDUPTHER

## 2021-12-06 RX ORDER — QUETIAPINE FUMARATE 50 MG/1
50 TABLET, FILM COATED ORAL NIGHTLY
Qty: 30 TABLET | Refills: 2 | Status: ON HOLD | OUTPATIENT
Start: 2021-12-06 | End: 2022-10-24 | Stop reason: HOSPADM

## 2021-12-06 RX ORDER — DULOXETIN HYDROCHLORIDE 60 MG/1
60 CAPSULE, DELAYED RELEASE ORAL DAILY
Qty: 30 CAPSULE | Refills: 2 | Status: SHIPPED | OUTPATIENT
Start: 2021-12-06 | End: 2022-04-24

## 2021-12-09 ENCOUNTER — OFFICE VISIT (OUTPATIENT)
Dept: INTERNAL MEDICINE | Facility: CLINIC | Age: 51
End: 2021-12-09
Payer: COMMERCIAL

## 2021-12-09 VITALS
HEIGHT: 66 IN | WEIGHT: 293 LBS | HEART RATE: 95 BPM | DIASTOLIC BLOOD PRESSURE: 96 MMHG | BODY MASS INDEX: 47.09 KG/M2 | OXYGEN SATURATION: 96 % | TEMPERATURE: 98 F | SYSTOLIC BLOOD PRESSURE: 142 MMHG

## 2021-12-09 DIAGNOSIS — Z11.59 ENCOUNTER FOR HEPATITIS C SCREENING TEST FOR LOW RISK PATIENT: ICD-10-CM

## 2021-12-09 DIAGNOSIS — R10.9 ABDOMINAL PAIN, UNSPECIFIED ABDOMINAL LOCATION: ICD-10-CM

## 2021-12-09 DIAGNOSIS — E66.01 MORBID OBESITY WITH BMI OF 45.0-49.9, ADULT: ICD-10-CM

## 2021-12-09 DIAGNOSIS — K57.32 DIVERTICULITIS OF LARGE INTESTINE WITHOUT PERFORATION OR ABSCESS WITHOUT BLEEDING: ICD-10-CM

## 2021-12-09 DIAGNOSIS — Z12.31 SCREENING MAMMOGRAM, ENCOUNTER FOR: ICD-10-CM

## 2021-12-09 DIAGNOSIS — J30.9 ALLERGIC RHINITIS, UNSPECIFIED SEASONALITY, UNSPECIFIED TRIGGER: ICD-10-CM

## 2021-12-09 DIAGNOSIS — Z00.00 ANNUAL PHYSICAL EXAM: Primary | ICD-10-CM

## 2021-12-09 DIAGNOSIS — F90.9 ATTENTION DEFICIT HYPERACTIVITY DISORDER (ADHD), UNSPECIFIED ADHD TYPE: ICD-10-CM

## 2021-12-09 DIAGNOSIS — Z11.4 ENCOUNTER FOR HIV (HUMAN IMMUNODEFICIENCY VIRUS) TEST: ICD-10-CM

## 2021-12-09 DIAGNOSIS — F41.9 ANXIETY: ICD-10-CM

## 2021-12-09 DIAGNOSIS — I10 ESSENTIAL HYPERTENSION: ICD-10-CM

## 2021-12-09 DIAGNOSIS — J42 CHRONIC BRONCHITIS, UNSPECIFIED CHRONIC BRONCHITIS TYPE: ICD-10-CM

## 2021-12-09 DIAGNOSIS — R10.13 EPIGASTRIC PAIN: ICD-10-CM

## 2021-12-09 DIAGNOSIS — Z72.0 TOBACCO ABUSE: ICD-10-CM

## 2021-12-09 DIAGNOSIS — K21.9 GASTROESOPHAGEAL REFLUX DISEASE, UNSPECIFIED WHETHER ESOPHAGITIS PRESENT: ICD-10-CM

## 2021-12-09 PROCEDURE — 99396 PREV VISIT EST AGE 40-64: CPT | Mod: S$GLB,,,

## 2021-12-09 PROCEDURE — 99999 PR PBB SHADOW E&M-EST. PATIENT-LVL V: ICD-10-PCS | Mod: PBBFAC,,,

## 2021-12-09 PROCEDURE — 99396 PR PREVENTIVE VISIT,EST,40-64: ICD-10-PCS | Mod: S$GLB,,,

## 2021-12-09 PROCEDURE — 99999 PR PBB SHADOW E&M-EST. PATIENT-LVL V: CPT | Mod: PBBFAC,,,

## 2021-12-10 ENCOUNTER — LAB VISIT (OUTPATIENT)
Dept: LAB | Facility: HOSPITAL | Age: 51
End: 2021-12-10
Payer: COMMERCIAL

## 2021-12-10 DIAGNOSIS — K21.9 GASTROESOPHAGEAL REFLUX DISEASE, UNSPECIFIED WHETHER ESOPHAGITIS PRESENT: ICD-10-CM

## 2021-12-10 DIAGNOSIS — Z11.4 ENCOUNTER FOR HIV (HUMAN IMMUNODEFICIENCY VIRUS) TEST: ICD-10-CM

## 2021-12-10 DIAGNOSIS — Z00.00 ANNUAL PHYSICAL EXAM: ICD-10-CM

## 2021-12-10 DIAGNOSIS — I10 ESSENTIAL HYPERTENSION: ICD-10-CM

## 2021-12-10 DIAGNOSIS — E66.01 MORBID OBESITY WITH BMI OF 45.0-49.9, ADULT: ICD-10-CM

## 2021-12-10 DIAGNOSIS — R10.9 ABDOMINAL PAIN, UNSPECIFIED ABDOMINAL LOCATION: ICD-10-CM

## 2021-12-10 DIAGNOSIS — Z11.59 ENCOUNTER FOR HEPATITIS C SCREENING TEST FOR LOW RISK PATIENT: ICD-10-CM

## 2021-12-10 LAB
ALBUMIN SERPL BCP-MCNC: 3.6 G/DL (ref 3.5–5.2)
ALP SERPL-CCNC: 96 U/L (ref 55–135)
ALT SERPL W/O P-5'-P-CCNC: 28 U/L (ref 10–44)
ANION GAP SERPL CALC-SCNC: 11 MMOL/L (ref 8–16)
AST SERPL-CCNC: 24 U/L (ref 10–40)
BASOPHILS # BLD AUTO: 0.04 K/UL (ref 0–0.2)
BASOPHILS NFR BLD: 0.3 % (ref 0–1.9)
BILIRUB SERPL-MCNC: 0.4 MG/DL (ref 0.1–1)
BUN SERPL-MCNC: 11 MG/DL (ref 6–20)
CALCIUM SERPL-MCNC: 9.5 MG/DL (ref 8.7–10.5)
CHLORIDE SERPL-SCNC: 105 MMOL/L (ref 95–110)
CHOLEST SERPL-MCNC: 142 MG/DL (ref 120–199)
CHOLEST/HDLC SERPL: 2.7 {RATIO} (ref 2–5)
CO2 SERPL-SCNC: 21 MMOL/L (ref 23–29)
CREAT SERPL-MCNC: 0.8 MG/DL (ref 0.5–1.4)
DIFFERENTIAL METHOD: ABNORMAL
EOSINOPHIL # BLD AUTO: 0.2 K/UL (ref 0–0.5)
EOSINOPHIL NFR BLD: 1.7 % (ref 0–8)
ERYTHROCYTE [DISTWIDTH] IN BLOOD BY AUTOMATED COUNT: 14.7 % (ref 11.5–14.5)
EST. GFR  (AFRICAN AMERICAN): >60 ML/MIN/1.73 M^2
EST. GFR  (NON AFRICAN AMERICAN): >60 ML/MIN/1.73 M^2
GLUCOSE SERPL-MCNC: 88 MG/DL (ref 70–110)
HCT VFR BLD AUTO: 42.4 % (ref 37–48.5)
HDLC SERPL-MCNC: 53 MG/DL (ref 40–75)
HDLC SERPL: 37.3 % (ref 20–50)
HGB BLD-MCNC: 13.3 G/DL (ref 12–16)
IMM GRANULOCYTES # BLD AUTO: 0.06 K/UL (ref 0–0.04)
IMM GRANULOCYTES NFR BLD AUTO: 0.5 % (ref 0–0.5)
LDLC SERPL CALC-MCNC: 70.6 MG/DL (ref 63–159)
LIPASE SERPL-CCNC: 23 U/L (ref 4–60)
LYMPHOCYTES # BLD AUTO: 2.7 K/UL (ref 1–4.8)
LYMPHOCYTES NFR BLD: 20.9 % (ref 18–48)
MCH RBC QN AUTO: 29.4 PG (ref 27–31)
MCHC RBC AUTO-ENTMCNC: 31.4 G/DL (ref 32–36)
MCV RBC AUTO: 94 FL (ref 82–98)
MONOCYTES # BLD AUTO: 0.9 K/UL (ref 0.3–1)
MONOCYTES NFR BLD: 7.1 % (ref 4–15)
NEUTROPHILS # BLD AUTO: 8.8 K/UL (ref 1.8–7.7)
NEUTROPHILS NFR BLD: 69.5 % (ref 38–73)
NONHDLC SERPL-MCNC: 89 MG/DL
NRBC BLD-RTO: 0 /100 WBC
PLATELET # BLD AUTO: 306 K/UL (ref 150–450)
PMV BLD AUTO: 10.8 FL (ref 9.2–12.9)
POTASSIUM SERPL-SCNC: 4.1 MMOL/L (ref 3.5–5.1)
PROT SERPL-MCNC: 6.8 G/DL (ref 6–8.4)
RBC # BLD AUTO: 4.52 M/UL (ref 4–5.4)
SODIUM SERPL-SCNC: 137 MMOL/L (ref 136–145)
TRIGL SERPL-MCNC: 92 MG/DL (ref 30–150)
TSH SERPL DL<=0.005 MIU/L-ACNC: 1.11 UIU/ML (ref 0.4–4)
WBC # BLD AUTO: 12.7 K/UL (ref 3.9–12.7)

## 2021-12-10 PROCEDURE — 80061 LIPID PANEL: CPT

## 2021-12-10 PROCEDURE — 87389 HIV-1 AG W/HIV-1&-2 AB AG IA: CPT

## 2021-12-10 PROCEDURE — 85025 COMPLETE CBC W/AUTO DIFF WBC: CPT

## 2021-12-10 PROCEDURE — 86803 HEPATITIS C AB TEST: CPT

## 2021-12-10 PROCEDURE — 80053 COMPREHEN METABOLIC PANEL: CPT

## 2021-12-10 PROCEDURE — 83690 ASSAY OF LIPASE: CPT

## 2021-12-10 PROCEDURE — 84443 ASSAY THYROID STIM HORMONE: CPT

## 2021-12-10 PROCEDURE — 36415 COLL VENOUS BLD VENIPUNCTURE: CPT

## 2021-12-13 LAB
HCV AB SERPL QL IA: NEGATIVE
HIV 1+2 AB+HIV1 P24 AG SERPL QL IA: NEGATIVE

## 2021-12-15 ENCOUNTER — OFFICE VISIT (OUTPATIENT)
Dept: GASTROENTEROLOGY | Facility: CLINIC | Age: 51
End: 2021-12-15
Payer: COMMERCIAL

## 2021-12-15 DIAGNOSIS — K85.30 DRUG-INDUCED ACUTE PANCREATITIS, UNSPECIFIED COMPLICATION STATUS: Primary | ICD-10-CM

## 2021-12-15 DIAGNOSIS — K57.32 DIVERTICULITIS OF LARGE INTESTINE WITHOUT PERFORATION OR ABSCESS WITHOUT BLEEDING: ICD-10-CM

## 2021-12-15 DIAGNOSIS — R10.13 EPIGASTRIC PAIN: ICD-10-CM

## 2021-12-15 PROCEDURE — 99499 UNLISTED E&M SERVICE: CPT | Mod: 95,,, | Performed by: INTERNAL MEDICINE

## 2021-12-15 PROCEDURE — 99499 NO LOS: ICD-10-PCS | Mod: 95,,, | Performed by: INTERNAL MEDICINE

## 2021-12-16 ENCOUNTER — OFFICE VISIT (OUTPATIENT)
Dept: GASTROENTEROLOGY | Facility: CLINIC | Age: 51
End: 2021-12-16
Payer: COMMERCIAL

## 2021-12-16 ENCOUNTER — TELEPHONE (OUTPATIENT)
Dept: GASTROENTEROLOGY | Facility: CLINIC | Age: 51
End: 2021-12-16
Payer: COMMERCIAL

## 2021-12-16 ENCOUNTER — LAB VISIT (OUTPATIENT)
Dept: LAB | Facility: HOSPITAL | Age: 51
End: 2021-12-16
Attending: INTERNAL MEDICINE
Payer: COMMERCIAL

## 2021-12-16 VITALS
OXYGEN SATURATION: 97 % | WEIGHT: 293 LBS | DIASTOLIC BLOOD PRESSURE: 90 MMHG | SYSTOLIC BLOOD PRESSURE: 126 MMHG | HEART RATE: 92 BPM | HEIGHT: 66 IN | BODY MASS INDEX: 47.09 KG/M2

## 2021-12-16 DIAGNOSIS — R10.13 EPIGASTRIC ABDOMINAL PAIN: Primary | ICD-10-CM

## 2021-12-16 DIAGNOSIS — R19.7 DIARRHEA IN ADULT PATIENT: ICD-10-CM

## 2021-12-16 DIAGNOSIS — Z87.19 HX OF DIVERTICULITIS OF COLON: ICD-10-CM

## 2021-12-16 DIAGNOSIS — Z87.19 HX OF PANCREATITIS: ICD-10-CM

## 2021-12-16 DIAGNOSIS — R10.13 EPIGASTRIC ABDOMINAL PAIN: ICD-10-CM

## 2021-12-16 DIAGNOSIS — R51.9 INTRACTABLE HEADACHE, UNSPECIFIED CHRONICITY PATTERN, UNSPECIFIED HEADACHE TYPE: ICD-10-CM

## 2021-12-16 DIAGNOSIS — R11.0 NAUSEA: ICD-10-CM

## 2021-12-16 DIAGNOSIS — K21.9 GASTROESOPHAGEAL REFLUX DISEASE, UNSPECIFIED WHETHER ESOPHAGITIS PRESENT: ICD-10-CM

## 2021-12-16 LAB
CRP SERPL-MCNC: 21.2 MG/L (ref 0–8.2)
ERYTHROCYTE [SEDIMENTATION RATE] IN BLOOD BY WESTERGREN METHOD: 17 MM/HR (ref 0–20)

## 2021-12-16 PROCEDURE — 3008F BODY MASS INDEX DOCD: CPT | Mod: CPTII,S$GLB,, | Performed by: INTERNAL MEDICINE

## 2021-12-16 PROCEDURE — 82272 OCCULT BLD FECES 1-3 TESTS: CPT | Performed by: INTERNAL MEDICINE

## 2021-12-16 PROCEDURE — 87209 SMEAR COMPLEX STAIN: CPT | Performed by: INTERNAL MEDICINE

## 2021-12-16 PROCEDURE — 1159F PR MEDICATION LIST DOCUMENTED IN MEDICAL RECORD: ICD-10-PCS | Mod: CPTII,S$GLB,, | Performed by: INTERNAL MEDICINE

## 2021-12-16 PROCEDURE — 86140 C-REACTIVE PROTEIN: CPT | Performed by: INTERNAL MEDICINE

## 2021-12-16 PROCEDURE — 82705 FATS/LIPIDS FECES QUAL: CPT | Performed by: INTERNAL MEDICINE

## 2021-12-16 PROCEDURE — 89055 LEUKOCYTE ASSESSMENT FECAL: CPT | Performed by: INTERNAL MEDICINE

## 2021-12-16 PROCEDURE — 1160F RVW MEDS BY RX/DR IN RCRD: CPT | Mod: CPTII,S$GLB,, | Performed by: INTERNAL MEDICINE

## 2021-12-16 PROCEDURE — 99999 PR PBB SHADOW E&M-EST. PATIENT-LVL IV: ICD-10-PCS | Mod: PBBFAC,,, | Performed by: INTERNAL MEDICINE

## 2021-12-16 PROCEDURE — 1159F MED LIST DOCD IN RCRD: CPT | Mod: CPTII,S$GLB,, | Performed by: INTERNAL MEDICINE

## 2021-12-16 PROCEDURE — 1160F PR REVIEW ALL MEDS BY PRESCRIBER/CLIN PHARMACIST DOCUMENTED: ICD-10-PCS | Mod: CPTII,S$GLB,, | Performed by: INTERNAL MEDICINE

## 2021-12-16 PROCEDURE — 83993 ASSAY FOR CALPROTECTIN FECAL: CPT | Performed by: INTERNAL MEDICINE

## 2021-12-16 PROCEDURE — 3008F PR BODY MASS INDEX (BMI) DOCUMENTED: ICD-10-PCS | Mod: CPTII,S$GLB,, | Performed by: INTERNAL MEDICINE

## 2021-12-16 PROCEDURE — 85651 RBC SED RATE NONAUTOMATED: CPT | Performed by: INTERNAL MEDICINE

## 2021-12-16 PROCEDURE — 87177 OVA AND PARASITES SMEARS: CPT | Performed by: INTERNAL MEDICINE

## 2021-12-16 PROCEDURE — 3080F DIAST BP >= 90 MM HG: CPT | Mod: CPTII,S$GLB,, | Performed by: INTERNAL MEDICINE

## 2021-12-16 PROCEDURE — 99204 PR OFFICE/OUTPT VISIT, NEW, LEVL IV, 45-59 MIN: ICD-10-PCS | Mod: S$GLB,,, | Performed by: INTERNAL MEDICINE

## 2021-12-16 PROCEDURE — 99999 PR PBB SHADOW E&M-EST. PATIENT-LVL IV: CPT | Mod: PBBFAC,,, | Performed by: INTERNAL MEDICINE

## 2021-12-16 PROCEDURE — 36415 COLL VENOUS BLD VENIPUNCTURE: CPT | Performed by: INTERNAL MEDICINE

## 2021-12-16 PROCEDURE — U0002 COVID-19 LAB TEST NON-CDC: HCPCS

## 2021-12-16 PROCEDURE — 3074F PR MOST RECENT SYSTOLIC BLOOD PRESSURE < 130 MM HG: ICD-10-PCS | Mod: CPTII,S$GLB,, | Performed by: INTERNAL MEDICINE

## 2021-12-16 PROCEDURE — 99204 OFFICE O/P NEW MOD 45 MIN: CPT | Mod: S$GLB,,, | Performed by: INTERNAL MEDICINE

## 2021-12-16 PROCEDURE — 3074F SYST BP LT 130 MM HG: CPT | Mod: CPTII,S$GLB,, | Performed by: INTERNAL MEDICINE

## 2021-12-16 PROCEDURE — 3080F PR MOST RECENT DIASTOLIC BLOOD PRESSURE >= 90 MM HG: ICD-10-PCS | Mod: CPTII,S$GLB,, | Performed by: INTERNAL MEDICINE

## 2021-12-16 RX ORDER — DICYCLOMINE HYDROCHLORIDE 10 MG/1
CAPSULE ORAL
Qty: 30 CAPSULE | Refills: 3 | Status: ON HOLD | OUTPATIENT
Start: 2021-12-16 | End: 2022-10-24 | Stop reason: HOSPADM

## 2021-12-16 RX ORDER — OMEPRAZOLE 40 MG/1
40 CAPSULE, DELAYED RELEASE ORAL DAILY
Qty: 30 CAPSULE | Refills: 5 | Status: SHIPPED | OUTPATIENT
Start: 2021-12-16 | End: 2023-08-30

## 2021-12-17 LAB
OB PNL STL: NEGATIVE
WBC #/AREA STL HPF: NORMAL /[HPF]

## 2021-12-20 LAB
FAT STL QL: NORMAL
NEUTRAL FAT STL QL: NORMAL

## 2021-12-21 ENCOUNTER — PATIENT MESSAGE (OUTPATIENT)
Dept: GASTROENTEROLOGY | Facility: CLINIC | Age: 51
End: 2021-12-21
Payer: COMMERCIAL

## 2021-12-21 LAB — O+P STL MICRO: NORMAL

## 2021-12-22 DIAGNOSIS — Z87.19 HX OF DIVERTICULITIS OF COLON: ICD-10-CM

## 2021-12-22 DIAGNOSIS — R19.7 DIARRHEA IN ADULT PATIENT: Primary | ICD-10-CM

## 2021-12-22 DIAGNOSIS — R10.84 GENERALIZED ABDOMINAL PAIN: ICD-10-CM

## 2021-12-22 DIAGNOSIS — Z87.19 HX OF GASTROESOPHAGEAL REFLUX (GERD): ICD-10-CM

## 2021-12-22 LAB — CALPROTECTIN STL-MCNT: ABNORMAL MCG/G

## 2021-12-22 RX ORDER — SODIUM, POTASSIUM,MAG SULFATES 17.5-3.13G
1 SOLUTION, RECONSTITUTED, ORAL ORAL DAILY
Qty: 354 ML | Refills: 0 | Status: SHIPPED | OUTPATIENT
Start: 2021-12-22 | End: 2021-12-24

## 2021-12-27 ENCOUNTER — PATIENT MESSAGE (OUTPATIENT)
Dept: PHARMACY | Facility: CLINIC | Age: 51
End: 2021-12-27
Payer: COMMERCIAL

## 2021-12-28 DIAGNOSIS — R51.9 INTRACTABLE HEADACHE, UNSPECIFIED CHRONICITY PATTERN, UNSPECIFIED HEADACHE TYPE: Primary | ICD-10-CM

## 2021-12-28 LAB
CTP QC/QA: YES
SARS-COV-2 RDRP RESP QL NAA+PROBE: NEGATIVE

## 2022-01-05 ENCOUNTER — TELEPHONE (OUTPATIENT)
Dept: GASTROENTEROLOGY | Facility: CLINIC | Age: 52
End: 2022-01-05
Payer: COMMERCIAL

## 2022-01-05 NOTE — TELEPHONE ENCOUNTER
I spoke with the pt.regarding giving instruction for her procedure scheduled for 01/07/22, but she stated, she was getting ready to call us to cancel because, her insurance will not cover her colon screening, they are requesting her to $900.00, don't have it.

## 2022-01-07 ENCOUNTER — PATIENT MESSAGE (OUTPATIENT)
Dept: PSYCHIATRY | Facility: CLINIC | Age: 52
End: 2022-01-07
Payer: COMMERCIAL

## 2022-01-10 ENCOUNTER — LAB VISIT (OUTPATIENT)
Dept: PRIMARY CARE CLINIC | Facility: CLINIC | Age: 52
End: 2022-01-10

## 2022-01-10 DIAGNOSIS — F90.0 ADHD, PREDOMINANTLY INATTENTIVE TYPE: ICD-10-CM

## 2022-01-10 DIAGNOSIS — Z20.822 ENCOUNTER FOR LABORATORY TESTING FOR COVID-19 VIRUS: Primary | ICD-10-CM

## 2022-01-10 LAB
CTP QC/QA: YES
SARS-COV-2 AG RESP QL IA.RAPID: NEGATIVE

## 2022-01-10 PROCEDURE — 87811 SARS CORONAVIRUS 2 ANTIGEN POCT, MANUAL READ: ICD-10-PCS | Mod: S$GLB,,, | Performed by: PREVENTIVE MEDICINE

## 2022-01-10 PROCEDURE — 87811 SARS-COV-2 COVID19 W/OPTIC: CPT | Mod: S$GLB,,, | Performed by: PREVENTIVE MEDICINE

## 2022-01-10 RX ORDER — DEXTROAMPHETAMINE SACCHARATE, AMPHETAMINE ASPARTATE MONOHYDRATE, DEXTROAMPHETAMINE SULFATE AND AMPHETAMINE SULFATE 7.5; 7.5; 7.5; 7.5 MG/1; MG/1; MG/1; MG/1
30 CAPSULE, EXTENDED RELEASE ORAL EVERY MORNING
Qty: 30 CAPSULE | Refills: 0 | Status: SHIPPED | OUTPATIENT
Start: 2022-01-10 | End: 2022-02-09

## 2022-01-10 RX ORDER — DEXTROAMPHETAMINE SACCHARATE, AMPHETAMINE ASPARTATE MONOHYDRATE, DEXTROAMPHETAMINE SULFATE AND AMPHETAMINE SULFATE 7.5; 7.5; 7.5; 7.5 MG/1; MG/1; MG/1; MG/1
30 CAPSULE, EXTENDED RELEASE ORAL EVERY MORNING
Qty: 30 CAPSULE | Refills: 0 | Status: SHIPPED | OUTPATIENT
Start: 2022-01-10 | End: 2022-01-10 | Stop reason: SDUPTHER

## 2022-01-10 RX ORDER — DEXTROAMPHETAMINE SACCHARATE, AMPHETAMINE ASPARTATE, DEXTROAMPHETAMINE SULFATE AND AMPHETAMINE SULFATE 3.75; 3.75; 3.75; 3.75 MG/1; MG/1; MG/1; MG/1
15 TABLET ORAL DAILY
Qty: 30 TABLET | Refills: 0 | Status: SHIPPED | OUTPATIENT
Start: 2022-01-10 | End: 2022-02-09

## 2022-01-10 RX ORDER — DEXTROAMPHETAMINE SACCHARATE, AMPHETAMINE ASPARTATE, DEXTROAMPHETAMINE SULFATE AND AMPHETAMINE SULFATE 3.75; 3.75; 3.75; 3.75 MG/1; MG/1; MG/1; MG/1
15 TABLET ORAL DAILY
Qty: 30 TABLET | Refills: 0 | Status: SHIPPED | OUTPATIENT
Start: 2022-01-10 | End: 2022-01-10 | Stop reason: SDUPTHER

## 2022-01-10 NOTE — PROGRESS NOTES
See messages     since November 2021 01/03/2022 12/06/2021   2  Alprazolam 0.5 Mg Tablet   30.00  30  Ke Ray  [id number]  Ivelisse ([id number])  1  1.00 LME  [place] 12/08/2021 12/08/2021   3  Tramadol Hcl 50 Mg Tablet   40.00  10  Cr Gre  [id number]  Och ([id number])  0  20.00 MME  [place] 12/06/2021 12/06/2021   2  Dextroamp-Amphetamin 15 Mg Tab   30.00  30  Ke Ray  [id number]  Ivelisse ([id number])  0   [place]     12/06/2021 12/06/2021   2  Dextroamp-Amphet Er 30 Mg Cap   30.00  30  Ke Ray  [id number]  [employer]velisse ([id number])  0   [place] 12/06/2021 12/06/2021   2  Alprazolam 0.5 Mg Tablet   30.00  30  Ke Ray  [id number]  Ivelisse ([id number])  0  1.00 LME  [place] 11/26/2021 11/26/2021   2  Promethazine Vc Solution   118.00  10  Ke Artie  [id number]  [employer]velisse ([id number])  0   [place]     11/03/2021  10/12/2021   2  Dextroamp-Amphet Er 30 Mg Cap   14.00  14  Ke Ray  [id number]  [employer]velisse ([id number])  0   [place] 11/01/2021 06/02/2021   2  Alprazolam 0.5 Mg Tablet   30.00  30  Cl Cue  [id number]  Ivelisse ([id number])  0              Attending

## 2022-02-02 ENCOUNTER — PATIENT MESSAGE (OUTPATIENT)
Dept: PSYCHIATRY | Facility: CLINIC | Age: 52
End: 2022-02-02

## 2022-04-04 ENCOUNTER — PATIENT MESSAGE (OUTPATIENT)
Dept: PSYCHIATRY | Facility: CLINIC | Age: 52
End: 2022-04-04
Payer: MEDICAID

## 2022-04-04 DIAGNOSIS — F90.0 ADHD, PREDOMINANTLY INATTENTIVE TYPE: ICD-10-CM

## 2022-04-04 DIAGNOSIS — F41.1 GENERALIZED ANXIETY DISORDER: ICD-10-CM

## 2022-04-04 DIAGNOSIS — J30.1 NON-SEASONAL ALLERGIC RHINITIS DUE TO POLLEN: ICD-10-CM

## 2022-04-04 DIAGNOSIS — J30.1 SEASONAL ALLERGIC RHINITIS DUE TO POLLEN: ICD-10-CM

## 2022-04-04 RX ORDER — LEVOCETIRIZINE DIHYDROCHLORIDE 5 MG/1
5 TABLET, FILM COATED ORAL NIGHTLY
Qty: 30 TABLET | Refills: 11 | Status: SHIPPED | OUTPATIENT
Start: 2022-04-04 | End: 2023-04-04

## 2022-04-05 RX ORDER — DEXTROAMPHETAMINE SACCHARATE, AMPHETAMINE ASPARTATE, DEXTROAMPHETAMINE SULFATE AND AMPHETAMINE SULFATE 3.75; 3.75; 3.75; 3.75 MG/1; MG/1; MG/1; MG/1
15 TABLET ORAL DAILY
Qty: 30 TABLET | Refills: 0 | OUTPATIENT
Start: 2022-04-05 | End: 2022-05-05

## 2022-04-05 RX ORDER — ALPRAZOLAM 0.5 MG/1
0.5 TABLET ORAL
Qty: 30 TABLET | Refills: 0 | Status: SHIPPED | OUTPATIENT
Start: 2022-04-05 | End: 2023-05-19 | Stop reason: DRUGHIGH

## 2022-04-11 ENCOUNTER — PATIENT MESSAGE (OUTPATIENT)
Dept: PHARMACY | Facility: CLINIC | Age: 52
End: 2022-04-11
Payer: MEDICAID

## 2022-04-18 DIAGNOSIS — R19.7 DIARRHEA IN ADULT PATIENT: Primary | ICD-10-CM

## 2022-04-18 DIAGNOSIS — R10.84 GENERALIZED ABDOMINAL PAIN: ICD-10-CM

## 2022-04-21 ENCOUNTER — PATIENT MESSAGE (OUTPATIENT)
Dept: PSYCHIATRY | Facility: CLINIC | Age: 52
End: 2022-04-21
Payer: MEDICAID

## 2022-04-24 ENCOUNTER — PATIENT MESSAGE (OUTPATIENT)
Dept: PSYCHIATRY | Facility: CLINIC | Age: 52
End: 2022-04-24
Payer: MEDICAID

## 2022-05-09 ENCOUNTER — PATIENT MESSAGE (OUTPATIENT)
Dept: SMOKING CESSATION | Facility: CLINIC | Age: 52
End: 2022-05-09
Payer: MEDICAID

## 2022-05-11 DIAGNOSIS — F41.1 GENERALIZED ANXIETY DISORDER: ICD-10-CM

## 2022-05-12 ENCOUNTER — PATIENT MESSAGE (OUTPATIENT)
Dept: PSYCHIATRY | Facility: CLINIC | Age: 52
End: 2022-05-12
Payer: MEDICAID

## 2022-05-12 RX ORDER — DULOXETIN HYDROCHLORIDE 60 MG/1
60 CAPSULE, DELAYED RELEASE ORAL DAILY
Qty: 30 CAPSULE | Refills: 1 | Status: SHIPPED | OUTPATIENT
Start: 2022-05-12 | End: 2023-05-19

## 2022-05-30 ENCOUNTER — PATIENT MESSAGE (OUTPATIENT)
Dept: PSYCHIATRY | Facility: CLINIC | Age: 52
End: 2022-05-30
Payer: COMMERCIAL

## 2022-05-31 ENCOUNTER — PATIENT MESSAGE (OUTPATIENT)
Dept: PSYCHIATRY | Facility: CLINIC | Age: 52
End: 2022-05-31
Payer: COMMERCIAL

## 2022-09-11 ENCOUNTER — PATIENT MESSAGE (OUTPATIENT)
Dept: PODIATRY | Facility: CLINIC | Age: 52
End: 2022-09-11
Payer: MEDICAID

## 2022-09-12 DIAGNOSIS — M20.42 HAMMER TOES OF BOTH FEET: Primary | ICD-10-CM

## 2022-09-12 DIAGNOSIS — M20.41 HAMMER TOES OF BOTH FEET: Primary | ICD-10-CM

## 2022-09-14 ENCOUNTER — HOSPITAL ENCOUNTER (OUTPATIENT)
Dept: RADIOLOGY | Facility: HOSPITAL | Age: 52
Discharge: HOME | End: 2022-09-14
Attending: PODIATRIST
Payer: MEDICAID

## 2022-09-14 DIAGNOSIS — M20.41 HAMMER TOES OF BOTH FEET: ICD-10-CM

## 2022-09-14 DIAGNOSIS — M20.42 HAMMER TOES OF BOTH FEET: ICD-10-CM

## 2022-09-14 PROCEDURE — 73630 X-RAY EXAM OF FOOT: CPT | Mod: 26,LT,, | Performed by: RADIOLOGY

## 2022-09-14 PROCEDURE — 73630 PR  X-RAY FOOT 3+ VW: ICD-10-PCS | Mod: 26,LT,, | Performed by: RADIOLOGY

## 2022-09-14 PROCEDURE — 73630 X-RAY EXAM OF FOOT: CPT | Mod: 26,RT,, | Performed by: RADIOLOGY

## 2022-09-14 PROCEDURE — 73630 X-RAY EXAM OF FOOT: CPT | Mod: TC,50,FY,PO

## 2022-09-21 ENCOUNTER — OFFICE VISIT (OUTPATIENT)
Dept: PODIATRY | Facility: CLINIC | Age: 52
End: 2022-09-21
Payer: MEDICAID

## 2022-09-21 ENCOUNTER — HOSPITAL ENCOUNTER (OUTPATIENT)
Dept: RADIOLOGY | Facility: HOSPITAL | Age: 52
Discharge: HOME | End: 2022-09-21
Attending: PODIATRIST
Payer: MEDICAID

## 2022-09-21 VITALS — BODY MASS INDEX: 47.09 KG/M2 | HEIGHT: 66 IN | WEIGHT: 293 LBS

## 2022-09-21 DIAGNOSIS — M79.675 PAIN IN TOES OF BOTH FEET: ICD-10-CM

## 2022-09-21 DIAGNOSIS — M79.674 PAIN IN TOES OF BOTH FEET: ICD-10-CM

## 2022-09-21 DIAGNOSIS — Z72.0 TOBACCO ABUSE: ICD-10-CM

## 2022-09-21 DIAGNOSIS — Z01.818 PREOP TESTING: ICD-10-CM

## 2022-09-21 DIAGNOSIS — M20.42 HAMMER TOES OF BOTH FEET: Primary | ICD-10-CM

## 2022-09-21 DIAGNOSIS — E66.01 MORBID OBESITY: ICD-10-CM

## 2022-09-21 DIAGNOSIS — M20.41 HAMMER TOES OF BOTH FEET: Primary | ICD-10-CM

## 2022-09-21 PROCEDURE — 3008F PR BODY MASS INDEX (BMI) DOCUMENTED: ICD-10-PCS | Mod: CPTII,,, | Performed by: PODIATRIST

## 2022-09-21 PROCEDURE — 99214 OFFICE O/P EST MOD 30 MIN: CPT | Mod: S$PBB,,, | Performed by: PODIATRIST

## 2022-09-21 PROCEDURE — 1159F PR MEDICATION LIST DOCUMENTED IN MEDICAL RECORD: ICD-10-PCS | Mod: CPTII,,, | Performed by: PODIATRIST

## 2022-09-21 PROCEDURE — 1160F PR REVIEW ALL MEDS BY PRESCRIBER/CLIN PHARMACIST DOCUMENTED: ICD-10-PCS | Mod: CPTII,,, | Performed by: PODIATRIST

## 2022-09-21 PROCEDURE — 71046 X-RAY EXAM CHEST 2 VIEWS: CPT | Mod: TC

## 2022-09-21 PROCEDURE — 71046 XR CHEST PA AND LATERAL: ICD-10-PCS | Mod: 26,,, | Performed by: RADIOLOGY

## 2022-09-21 PROCEDURE — 99214 OFFICE O/P EST MOD 30 MIN: CPT | Mod: PBBFAC | Performed by: PODIATRIST

## 2022-09-21 PROCEDURE — 99214 PR OFFICE/OUTPT VISIT, EST, LEVL IV, 30-39 MIN: ICD-10-PCS | Mod: S$PBB,,, | Performed by: PODIATRIST

## 2022-09-21 PROCEDURE — 71046 X-RAY EXAM CHEST 2 VIEWS: CPT | Mod: 26,,, | Performed by: RADIOLOGY

## 2022-09-21 PROCEDURE — 99999 PR PBB SHADOW E&M-EST. PATIENT-LVL IV: ICD-10-PCS | Mod: PBBFAC,,, | Performed by: PODIATRIST

## 2022-09-21 PROCEDURE — 1160F RVW MEDS BY RX/DR IN RCRD: CPT | Mod: CPTII,,, | Performed by: PODIATRIST

## 2022-09-21 PROCEDURE — 1159F MED LIST DOCD IN RCRD: CPT | Mod: CPTII,,, | Performed by: PODIATRIST

## 2022-09-21 PROCEDURE — 99999 PR PBB SHADOW E&M-EST. PATIENT-LVL IV: CPT | Mod: PBBFAC,,, | Performed by: PODIATRIST

## 2022-09-21 PROCEDURE — 3008F BODY MASS INDEX DOCD: CPT | Mod: CPTII,,, | Performed by: PODIATRIST

## 2022-09-21 NOTE — PROGRESS NOTES
Subjective:       Patient ID: Laura Vora is a 52 y.o. female.    Chief Complaint: Surgical Consult (Bilateral hammer toe sx for bilateral 2nd 3rd and 4th digits, 9/10 pain a present, non-diabetic, pcp DARREN Benites)    HPI: Laura Vora presents to the office today, with complaints of pains to the left and right foot. The pains are stated as severe at the 2nd and 3rd toes. Patient states limping with gait at times due to the pains. Pains are exacerbated by walking and standing. Sitting and limited WB does alleviate and/or decrease the pains. The patient does have hammer toe contractures. States alternation of shoe gear has not been helpful. Patient's Primary Care Provider is Darrian Benites NP. Smokes 1 pack of cigarettes Q7 days.    Review of patient's allergies indicates:   Allergen Reactions    Iodine and iodide containing products Hives    Bactrim [sulfamethoxazole-trimethoprim] Hives    Losartan potassium Other (See Comments)       Past Medical History:   Diagnosis Date    Bronchitis     Diverticulitis     HTN (hypertension)     Obese     Tobacco use        Family History   Problem Relation Age of Onset    Hypertension Unknown     Diabetes Mother     Hypertension Mother     Heart disease Mother     Cancer Sister     Breast cancer Sister     Cancer Paternal Aunt     Breast cancer Maternal Aunt        Social History     Socioeconomic History    Marital status: Single   Tobacco Use    Smoking status: Every Day     Packs/day: 0.50     Types: Cigarettes     Last attempt to quit: 2015     Years since quittin.6    Smokeless tobacco: Never   Substance and Sexual Activity    Alcohol use: Yes     Comment: occasionally    Drug use: No       Past Surgical History:   Procedure Laterality Date    APPENDECTOMY      CHOLECYSTECTOMY      HYSTERECTOMY         Review of Systems   Constitutional:  Negative for chills, fatigue and fever.   HENT:  Negative for hearing loss.    Eyes:  Negative for photophobia and  "visual disturbance.   Respiratory:  Negative for cough, chest tightness, shortness of breath and wheezing.    Cardiovascular:  Negative for chest pain and palpitations.   Gastrointestinal:  Negative for constipation, diarrhea, nausea and vomiting.   Endocrine: Negative for cold intolerance and heat intolerance.   Genitourinary:  Negative for flank pain.   Musculoskeletal:  Positive for gait problem. Negative for neck pain and neck stiffness.   Neurological:  Negative for light-headedness and headaches.   Psychiatric/Behavioral:  Negative for sleep disturbance.        Objective:   Ht 5' 6" (1.676 m)   Wt 135.5 kg (298 lb 11.6 oz)   LMP 09/05/2014 (Exact Date)   BMI 48.22 kg/m²     X-Ray Foot Complete Bilateral  Narrative: EXAMINATION:  XR FOOT COMPLETE 3 VIEW BILATERAL    CLINICAL HISTORY:  Other hammer toe(s) (acquired), right foot    TECHNIQUE:  AP, lateral, and oblique views of both feet were performed.    COMPARISON:  None    FINDINGS:  No acute osseous abnormality.  Bilateral hammertoe deformities present.  Bilateral pes planus suspected.  Degenerative findings of the bilateral midfoot noted.  Bilateral both dorsal and plantar calcaneal enthesophyte changes most noted at the left Achilles insertion.  Left Achilles insertional calcific tendinosis findings present.  Impression: As above    Electronically signed by: Darrian Mayfield MD  Date:    09/14/2022  Time:    11:42    Physical Exam    LOWER EXTREMITY PHYSICAL EXAMINATION    DERMATOLOGY: Skin is supple, dry and intact. Callus, B/L PIPJ 2nd and 3rd.    ORTHOPEDIC: Manual Muscle Testing is 5/5 in all planes on the left and right foot, without pains, with and without resistance. Gait pattern is antalgic. There is moderate to severe rigid hammer toe contracture to the left and right foot at the 2nd toe and 3rd toe. Associated metatarsalgia is noted.     VASCULAR: The right dorsalis pedis pulse is 0/4 and the posterior tibial pulse is 0/4. The left dorsalis " pedis pulse is 1/4 and the posterior tibial pulse is 0/4. Hair growth is sparse on the dorsal foot and digits. Proximal to distal, warm to warm. Capillary refill time is greater than 3s         Assessment:     1. Hammer toes of both feet    2. Pain in toes of both feet    3. Tobacco abuse    4. Preop testing    5. Morbid obesity          Plan:     Hammer toes of both feet    Pain in toes of both feet  -     Ankle Brachial Indices (JAMIL); Future; Expected date: 09/21/2022  -     CV Ultrasound doppler arterial legs bilat; Future; Expected date: 09/21/2022    Tobacco abuse  -     Ankle Brachial Indices (JAMIL); Future; Expected date: 09/21/2022  -     CV Ultrasound doppler arterial legs bilat; Future; Expected date: 09/21/2022  -     NICOTINE AND METABOLITES, BLOOD; Future; Expected date: 09/21/2022    Preop testing  -     Basic Metabolic Panel; Future; Expected date: 09/21/2022  -     CBC Without Differential; Future; Expected date: 09/21/2022  -     EKG 12-lead; Future  -     X-Ray Chest PA And Lateral; Future; Expected date: 09/21/2022    Morbid obesity    Thorough discussion is had with the patient today, concerning the diagnosis, its etiology, and the treatment algorithm at present.     XRAYS are reviewed in detail with the patient. All questions and concerns regarding findings and its/their implications are outlined and discussed.    This patient does have hammertoe (digital) contractures. I did advise the patient to ambulate with shoe gear that is high in the tox box to allow for extra room and depth in the sagittal plane, in order to alleviate and lessen the potential for dorsal digital break down at the IPJs. I do also recommended shoe gear that is soft and supple in the foot bed as to lessen the potential for plantar distal digital break down at the contracted digits. If the patient does not feel the aforementioned is necessary, he or she may also purchase OTC padding devices to be worn across the MTPJ, at the  distal aspects of the digits, and/or at the dorsal aspects of the IPJs. The patient does acknowledge understanding and is said to be amenable to compliance.    Did discuss in detail the harmful effects of nicotine/tobacco/cigarette/ marijuana smoking, especially in relation to the lower extremity. I do rec. consultation with primary care provider for further discussed of smoking cessation methods. Smoking & Tobacco use cessation couseling was rendered at today's visit; intermediate, bewteen 3 and 10 minutes.    The procedure of (B/L 2nd and 3rd digit hammer toe repair) was thoroughly explained to the patient. Its necessity and/or purpose and the implications therein were outlined, including any pertinent advantages and/or disadvantages, and possible complications, if any. Possible complications include recurrence of pathology and/or deformity, infection (cellulitis, drainage, purulence, malodor, etc...), pain, numbness, neuritis, edema, burning, loss of function, need for further surgery, possible need for removal of any implanted hardware, soft tissue contracture and/or scarring, etc... No guarantees were given and/or implied. Post-operative expectations and weightbearing protocol is thoroughly explained to the patient, who acknowledges understanding.     Please obtain JAMIL PVR.   If Sx., wants 10/24/22.           Future Appointments   Date Time Provider Department Center   9/21/2022  1:45 PM ONLH XR1-DR ONLH XRAY [facility]   9/21/2022  4:40 PM LABORATORYGÉNESIS LAB [facility]   9/22/2022  2:30 PM CARDIOVASCULAR 1, ON ON SPECCPR  [facility]   9/22/2022  3:15 PM CARDIOVASCULAR 1, ONKAELA ON SPECCPR  [facility]   9/22/2022  4:00 PM [facility]

## 2022-09-22 ENCOUNTER — HOSPITAL ENCOUNTER (OUTPATIENT)
Dept: CARDIOLOGY | Facility: HOSPITAL | Age: 52
Discharge: HOME | End: 2022-09-22
Attending: PODIATRIST
Payer: MEDICAID

## 2022-09-22 ENCOUNTER — PATIENT MESSAGE (OUTPATIENT)
Dept: PODIATRY | Facility: CLINIC | Age: 52
End: 2022-09-22
Payer: MEDICAID

## 2022-09-22 DIAGNOSIS — M79.675 PAIN IN TOES OF BOTH FEET: ICD-10-CM

## 2022-09-22 DIAGNOSIS — M79.674 PAIN IN TOES OF BOTH FEET: ICD-10-CM

## 2022-09-22 DIAGNOSIS — Z72.0 TOBACCO ABUSE: ICD-10-CM

## 2022-09-22 LAB
LEFT ABI: 1.22
LEFT ANT TIBIAL SYS PSV: 85 CM/S
LEFT ARM BP: 134 MMHG
LEFT CFA PSV: 153 CM/S
LEFT DORSALIS PEDIS: 161 MMHG
LEFT PERONEAL SYS PSV: 44 CM/S
LEFT POPLITEAL PSV: 55 CM/S
LEFT POST TIBIAL SYS PSV: 81 CM/S
LEFT POSTERIOR TIBIAL: 164 MMHG
LEFT PROFUNDA SYS PSV: 60 CM/S
LEFT SUPER FEMORAL DIST SYS PSV: 91 CM/S
LEFT SUPER FEMORAL MID SYS PSV: 91 CM/S
LEFT SUPER FEMORAL OSTIAL SYS PSV: 98 CM/S
LEFT SUPER FEMORAL PROX SYS PSV: 103 CM/S
LEFT TBI: 0.57
LEFT TIB/PER TRUNK SYS PSV: 86 CM/S
LEFT TOE PRESSURE: 76 MMHG
OHS CV LEFT LOWER EXTREMITY ABI (NO CALC): 1.18
OHS CV RIGHT ABI LOWER EXTREMITY (NO CALC): 1.35
RIGHT ABI: 1.4
RIGHT ANT TIBIAL SYS PSV: 76 CM/S
RIGHT ARM BP: 131 MMHG
RIGHT CFA PSV: 86 CM/S
RIGHT DORSALIS PEDIS: 165 MMHG
RIGHT PERONEAL SYS PSV: 28 CM/S
RIGHT POPLITEAL PSV: 59 CM/S
RIGHT POST TIBIAL SYS PSV: 41 CM/S
RIGHT POSTERIOR TIBIAL: 188 MMHG
RIGHT PROFUNDA SYS PSV: 49 CM/S
RIGHT SUPER FEMORAL DIST SYS PSV: 74 CM/S
RIGHT SUPER FEMORAL MID SYS PSV: 103 CM/S
RIGHT SUPER FEMORAL OSTIAL SYS PSV: 103 CM/S
RIGHT SUPER FEMORAL PROX SYS PSV: 87 CM/S
RIGHT TBI: 0.81
RIGHT TIB/PER TRUNK SYS PSV: 77 CM/S
RIGHT TOE PRESSURE: 109 MMHG

## 2022-09-22 PROCEDURE — 93925 LOWER EXTREMITY STUDY: CPT

## 2022-09-22 PROCEDURE — 93922 UPR/L XTREMITY ART 2 LEVELS: CPT | Mod: 26,,, | Performed by: INTERNAL MEDICINE

## 2022-09-22 PROCEDURE — 93922 UPR/L XTREMITY ART 2 LEVELS: CPT

## 2022-09-22 PROCEDURE — 93925 LOWER EXTREMITY STUDY: CPT | Mod: 26,,, | Performed by: INTERNAL MEDICINE

## 2022-09-22 PROCEDURE — 93925 CV US DOPPLER ARTERIAL LEGS BILATERAL (CUPID ONLY): ICD-10-PCS | Mod: 26,,, | Performed by: INTERNAL MEDICINE

## 2022-09-22 PROCEDURE — 93922 ANKLE BRACHIAL INDICES (ABI): ICD-10-PCS | Mod: 26,,, | Performed by: INTERNAL MEDICINE

## 2022-09-27 ENCOUNTER — PATIENT MESSAGE (OUTPATIENT)
Dept: SURGERY | Facility: HOSPITAL | Age: 52
End: 2022-09-27
Payer: MEDICAID

## 2022-10-03 ENCOUNTER — PATIENT MESSAGE (OUTPATIENT)
Dept: PODIATRY | Facility: CLINIC | Age: 52
End: 2022-10-03
Payer: MEDICAID

## 2022-10-07 ENCOUNTER — TELEPHONE (OUTPATIENT)
Dept: PREADMISSION TESTING | Facility: HOSPITAL | Age: 52
End: 2022-10-07
Payer: MEDICAID

## 2022-10-07 NOTE — TELEPHONE ENCOUNTER
Pre op instructions reviewed with Pt per phone: Spoke about pre op process and surgery instructions, verbalized understanding.    To confirm, Surgery is scheduled on 10/24/22. We will call you the business day (after 2:30 pm) prior to surgery to confirm arrival time as it is subject to change due to cancellations/ emergencies.    Please report to the [facility] (1st Floor) at [facility] located off of [facility] (2nd building on the left, in front of the [facility] pole).  Address: [address] [place] [address] [place]. [id number]        INSTRUCTIONS IMPORTANT!!!  Do Not Eat, Drink, or Smoke after 12 midnight unless instructed otherwise by your Surgeon. OK to brush teeth, no gum, candy or mints!      *Take Only these medications with a small sip of water Morning of Surgery:  Albuterol inhaler  Duloxetine  Flonase  Omeprazole      ____  Stop all Aspirin products, Ibuprofen, Advil, Motrin & Aleve at least 7 days prior to  surgery, unless otherwise instructed by your Physician office (May use Tylenol).  ____  Stop taking any Fish Oil/ Vitamins /Supplements at least 7 days prior to surgery, unless instructed otherwise by your Physician office.  ____  NO Acrylic/fake nails or nail polish worn day of surgery (specifically hand/arm & foot surgeries).  ____  NO powder, lotions, deodorants, oils or cream on body.  ____  Remove jewelry & piercings prior to surgery.  ____  Dentures, Hearing Aids & Contact Lens will need to be removed prior to the       start of surgery.  ____  Bring photo ID and insurance information to hospital (Leave Valuables at Home).  ____  If going home the same day, arrange for a ride home. You will not be able to drive 24 hrs if Anesthesia was used.   ____  Females (ages 11-60) may need to give a urine sample the morning of surgery; please see Pre op Nurse prior to using the restroom.  ____  Wear clean, loose fitting clothing to allow for dressings/ bandages.            Diabetic Patients: If you take diabetic  medication, do NOT take morning of surgery unless instructed by Doctor. Metformin to be stopped 24 hrs prior to surgery time. DO NOT take long-acting insulin the evening before surgery. Blood sugars will be checked in pre-op by Nurse.    Bathing Instructions:    -Do not shave your face or body the day before or the day of surgery unless instructed otherwise by your Surgeon.  -Do not shave pubic hair 7 days prior to surgery (gyn pt's).   -Shower & Rinse your body as usual with anti-bacterial Soap (Dial, Lever 2000,  or Hibiclens)   -Do not use Hibiclens on your face (Females should avoid genital area as well).   -Do not wash with anti-bacterial soap after you use the Hibiclens.   -Rinse & dry your body thoroughly. Apply clean clothes after shower.    [facility] Visitor/Ride Policy:  Only 2 adults allowed (over the age of 18) to accompany you to the Hospital. You Must have a ride home from a responsible adult that you know and trust. Medical Transport, Uber or Lyft can only be used if patient has a responsible adult to accompany them during ride home.    Discharge Instructions: You will receive Post-op/Discharge instructions by your Discharge Nurse prior to going home. Please call your Surgeon's office with any post-surgery questions/concerns @ [phone].    *If you are running late or have questions the morning of surgery, please call the Surgery Dept @ [phone]  *Insurance/ Financial Questions, please call [phone]    Thank you,  -[facility] Pre Admit Testing Nurse  [phone] or [phone]  M-F 7:30 am-4 pm

## 2022-10-14 ENCOUNTER — TELEPHONE (OUTPATIENT)
Dept: PAIN MEDICINE | Facility: CLINIC | Age: 52
End: 2022-10-14
Payer: MEDICAID

## 2022-10-14 ENCOUNTER — PATIENT MESSAGE (OUTPATIENT)
Dept: SURGERY | Facility: HOSPITAL | Age: 52
End: 2022-10-14
Payer: MEDICAID

## 2022-10-14 NOTE — TELEPHONE ENCOUNTER
----- Message from Stalin Blanton sent at 9/28/2022  3:11 PM CDT -----  Contact: [phone]  Patient would like to consult with a nurse in regards to scheduling an appt. The patient stated a referral was sent over by her pcp to schedule an appt for pain management. She also stated the referral was faxed over to  office I informed the patient that I did not see the referral in [facility]. Please give her a call back at [phone]. Thanks r/s

## 2022-10-14 NOTE — TELEPHONE ENCOUNTER
The departments access is limited at this time, please call Centralized scheduling number at [phone] who is a specialized call center who will help you find a clinic to accommodate your request.    LM DO NOT SCHEDULE

## 2022-10-15 NOTE — PATIENT INSTRUCTIONS
Weightbearing Status and Wound care:  1. When walking, wear the surgical shoe or walking boot at all times.    2. During daytime/awake hours, if a CAST or POSTERIOR SPLINT is in place, ice the posterior aspect of the leg, behind the knee, 20 minutes on (w/ ice) and followed by 20 minutes off (w/o ice) until follow up; repeat accordingly until follow up. IF no CAST or POSTERIOR SPLINT is in place, ice the anterior aspect of the ankle, in a similar manner. During night time/sleep hours, simply elevating the limb above the level of the heart is sufficient.     3. Elevate the extremity above the level of the heart at all times when sitting.    4. Take the pain mediations as prescribed for the initial 3 or so days, then only as needed.    5. If no overt medical contra-indication, take Motrin or Advil or Ibuprofen or Aleve in between the pain medication doses.    7. Do not change the dressings.    8. Do not get the dressings wet.     9. Please be reminded of the harmful effects of nicotine/tobacco/cigarette/cigar/marijuana smoking, especially in relation to the lower extremity, particularly in reference to post operative healing. I do rec. complete or near complete cessation of any or all of the aforementioned until you are well out of the post-operative period.    10. If you were prescribed more than one short acting opioid, e.g., Dilaudid with Percocet or Norco or Tramadol, the Dilaudid (Hydromorphone) is to be taken during the immediate initial days s/p, at an interval of every 6 hours or 5 hours or 4 hours, as needed for pain control. Once you are complete with the Dilaudid (Hydromorphone), you may/should convert to the secondary medication. DO NOT take both medications together at any time. It is not advisable to start with the less potent medication, Percocet or Norco or Tramadol, and then convert to the stronger medication. If you do start with the less potent medication, this should be the opioid therapy/drug of  choice without ever converting back to the Dilaudid. You or your family member or friend should monitor for over sedation and/or respiratory depression while medicating.

## 2022-10-17 ENCOUNTER — TELEPHONE (OUTPATIENT)
Dept: PODIATRY | Facility: CLINIC | Age: 52
End: 2022-10-17
Payer: MEDICAID

## 2022-10-17 ENCOUNTER — PATIENT MESSAGE (OUTPATIENT)
Dept: PODIATRY | Facility: CLINIC | Age: 52
End: 2022-10-17
Payer: MEDICAID

## 2022-10-19 ENCOUNTER — PATIENT MESSAGE (OUTPATIENT)
Dept: SURGERY | Facility: HOSPITAL | Age: 52
End: 2022-10-19
Payer: MEDICAID

## 2022-10-20 ENCOUNTER — PATIENT MESSAGE (OUTPATIENT)
Dept: SURGERY | Facility: HOSPITAL | Age: 52
End: 2022-10-20
Payer: MEDICAID

## 2022-10-21 ENCOUNTER — TELEPHONE (OUTPATIENT)
Dept: PREADMISSION TESTING | Facility: HOSPITAL | Age: 52
End: 2022-10-21
Payer: MEDICAID

## 2022-10-21 NOTE — TELEPHONE ENCOUNTER
Called and spoke with Pt's daughter about the following:     Please arrive to [facility] ([facility] main lobby at 6:30am on 10/24/22 for your scheduled procedure. NOTHING to eat or drink after midnight unless instructed otherwise by your Surgeon. Take only the medications instructed by your Pre Admit Nurse with small sip of water.

## 2022-10-24 ENCOUNTER — ANESTHESIA EVENT (OUTPATIENT)
Dept: SURGERY | Facility: HOSPITAL | Age: 52
End: 2022-10-24
Payer: MEDICAID

## 2022-10-24 ENCOUNTER — HOSPITAL ENCOUNTER (OUTPATIENT)
Facility: HOSPITAL | Age: 52
Discharge: HOME | End: 2022-10-24
Attending: PODIATRIST | Admitting: PODIATRIST
Payer: MEDICAID

## 2022-10-24 ENCOUNTER — ANESTHESIA (OUTPATIENT)
Dept: SURGERY | Facility: HOSPITAL | Age: 52
End: 2022-10-24
Payer: MEDICAID

## 2022-10-24 DIAGNOSIS — Z01.818 PREOP TESTING: ICD-10-CM

## 2022-10-24 DIAGNOSIS — M20.42 HAMMER TOES, BILATERAL: ICD-10-CM

## 2022-10-24 DIAGNOSIS — M20.41 HAMMER TOES, BILATERAL: ICD-10-CM

## 2022-10-24 PROCEDURE — 28285 PR REPAIR OF HAMMERTOE,ONE: ICD-10-PCS | Mod: 51,T7,, | Performed by: PODIATRIST

## 2022-10-24 PROCEDURE — 71000015 HC POSTOP RECOV 1ST HR: Performed by: PODIATRIST

## 2022-10-24 PROCEDURE — 37000009 HC ANESTHESIA EA ADD 15 MINS: Performed by: PODIATRIST

## 2022-10-24 PROCEDURE — 01480 ANES OPEN PX LOWER L/A/F NOS: CPT | Performed by: PODIATRIST

## 2022-10-24 PROCEDURE — 36000708 HC OR TIME LEV III 1ST 15 MIN: Performed by: PODIATRIST

## 2022-10-24 PROCEDURE — 93010 EKG 12-LEAD: ICD-10-PCS | Mod: ,,, | Performed by: INTERNAL MEDICINE

## 2022-10-24 PROCEDURE — 25000003 PHARM REV CODE 250: Performed by: PODIATRIST

## 2022-10-24 PROCEDURE — 63600175 PHARM REV CODE 636 W HCPCS: Performed by: NURSE ANESTHETIST, CERTIFIED REGISTERED

## 2022-10-24 PROCEDURE — 25000003 PHARM REV CODE 250: Performed by: NURSE ANESTHETIST, CERTIFIED REGISTERED

## 2022-10-24 PROCEDURE — 28270 PR CAPSULOTOMY MT-P JT,FOOT,EACH: ICD-10-PCS | Mod: 59,T7,, | Performed by: PODIATRIST

## 2022-10-24 PROCEDURE — 71000033 HC RECOVERY, INTIAL HOUR: Performed by: PODIATRIST

## 2022-10-24 PROCEDURE — C1769 GUIDE WIRE: HCPCS | Performed by: PODIATRIST

## 2022-10-24 PROCEDURE — 93005 ELECTROCARDIOGRAM TRACING: CPT

## 2022-10-24 PROCEDURE — 27201423 OPTIME MED/SURG SUP & DEVICES STERILE SUPPLY: Performed by: PODIATRIST

## 2022-10-24 PROCEDURE — 37000008 HC ANESTHESIA 1ST 15 MINUTES: Performed by: PODIATRIST

## 2022-10-24 PROCEDURE — 28270 RELEASE OF FOOT CONTRACTURE: CPT | Mod: 59,T6,, | Performed by: PODIATRIST

## 2022-10-24 PROCEDURE — 93010 ELECTROCARDIOGRAM REPORT: CPT | Mod: ,,, | Performed by: INTERNAL MEDICINE

## 2022-10-24 PROCEDURE — 36000709 HC OR TIME LEV III EA ADD 15 MIN: Performed by: PODIATRIST

## 2022-10-24 PROCEDURE — 28285 REPAIR OF HAMMERTOE: CPT | Mod: T6,,, | Performed by: PODIATRIST

## 2022-10-24 DEVICE — WIRE C TROCAR TIP .045: Type: IMPLANTABLE DEVICE | Site: TOE | Status: FUNCTIONAL

## 2022-10-24 RX ORDER — CLINDAMYCIN PHOSPHATE 900 MG/50ML
INJECTION, SOLUTION INTRAVENOUS
Status: DISCONTINUED | OUTPATIENT
Start: 2022-10-24 | End: 2022-10-24

## 2022-10-24 RX ORDER — CLINDAMYCIN PHOSPHATE 900 MG/50ML
900 INJECTION, SOLUTION INTRAVENOUS
Status: ACTIVE | OUTPATIENT
Start: 2022-10-24 | End: 2022-10-24

## 2022-10-24 RX ORDER — MIDAZOLAM HYDROCHLORIDE 1 MG/ML
INJECTION, SOLUTION INTRAMUSCULAR; INTRAVENOUS
Status: DISCONTINUED | OUTPATIENT
Start: 2022-10-24 | End: 2022-10-24

## 2022-10-24 RX ORDER — FENTANYL CITRATE 50 UG/ML
INJECTION, SOLUTION INTRAMUSCULAR; INTRAVENOUS
Status: DISCONTINUED | OUTPATIENT
Start: 2022-10-24 | End: 2022-10-24

## 2022-10-24 RX ORDER — PROPOFOL 10 MG/ML
VIAL (ML) INTRAVENOUS
Status: DISCONTINUED | OUTPATIENT
Start: 2022-10-24 | End: 2022-10-24

## 2022-10-24 RX ORDER — KETOROLAC TROMETHAMINE 30 MG/ML
15 INJECTION, SOLUTION INTRAMUSCULAR; INTRAVENOUS EVERY 8 HOURS PRN
Status: DISCONTINUED | OUTPATIENT
Start: 2022-10-24 | End: 2022-10-24 | Stop reason: HOSPADM

## 2022-10-24 RX ORDER — SUCCINYLCHOLINE CHLORIDE 20 MG/ML
INJECTION INTRAMUSCULAR; INTRAVENOUS
Status: DISCONTINUED | OUTPATIENT
Start: 2022-10-24 | End: 2022-10-24

## 2022-10-24 RX ORDER — ONDANSETRON 2 MG/ML
4 INJECTION INTRAMUSCULAR; INTRAVENOUS DAILY PRN
Status: DISCONTINUED | OUTPATIENT
Start: 2022-10-24 | End: 2022-10-24 | Stop reason: HOSPADM

## 2022-10-24 RX ORDER — OXYCODONE AND ACETAMINOPHEN 10; 325 MG/1; MG/1
1 TABLET ORAL EVERY 6 HOURS PRN
Qty: 28 TABLET | Refills: 0 | Status: SHIPPED | OUTPATIENT
Start: 2022-10-24 | End: 2022-10-31

## 2022-10-24 RX ORDER — DEXAMETHASONE SODIUM PHOSPHATE 4 MG/ML
INJECTION, SOLUTION INTRA-ARTICULAR; INTRALESIONAL; INTRAMUSCULAR; INTRAVENOUS; SOFT TISSUE
Status: DISCONTINUED | OUTPATIENT
Start: 2022-10-24 | End: 2022-10-24

## 2022-10-24 RX ORDER — BUPIVACAINE HYDROCHLORIDE AND EPINEPHRINE 2.5; 5 MG/ML; UG/ML
INJECTION, SOLUTION EPIDURAL; INFILTRATION; INTRACAUDAL; PERINEURAL
Status: DISCONTINUED | OUTPATIENT
Start: 2022-10-24 | End: 2022-10-24 | Stop reason: HOSPADM

## 2022-10-24 RX ORDER — CEFADROXIL 500 MG/1
500 CAPSULE ORAL EVERY 12 HOURS
Qty: 10 CAPSULE | Refills: 0 | Status: SHIPPED | OUTPATIENT
Start: 2022-10-24 | End: 2022-10-29

## 2022-10-24 RX ORDER — HYDROMORPHONE HYDROCHLORIDE 2 MG/ML
0.2 INJECTION, SOLUTION INTRAMUSCULAR; INTRAVENOUS; SUBCUTANEOUS EVERY 5 MIN PRN
Status: DISCONTINUED | OUTPATIENT
Start: 2022-10-24 | End: 2022-10-24 | Stop reason: HOSPADM

## 2022-10-24 RX ORDER — GABAPENTIN 100 MG/1
100 CAPSULE ORAL 2 TIMES DAILY
Qty: 60 CAPSULE | Refills: 0 | Status: SHIPPED | OUTPATIENT
Start: 2022-10-24 | End: 2022-11-17 | Stop reason: SDUPTHER

## 2022-10-24 RX ORDER — HYDROMORPHONE HYDROCHLORIDE 4 MG/1
4 TABLET ORAL EVERY 4 HOURS PRN
Qty: 18 TABLET | Refills: 0 | Status: SHIPPED | OUTPATIENT
Start: 2022-10-24 | End: 2022-10-27

## 2022-10-24 RX ORDER — ACETAMINOPHEN 10 MG/ML
1000 INJECTION, SOLUTION INTRAVENOUS ONCE
Status: DISCONTINUED | OUTPATIENT
Start: 2022-10-24 | End: 2022-10-24 | Stop reason: HOSPADM

## 2022-10-24 RX ORDER — LIDOCAINE HYDROCHLORIDE 20 MG/ML
INJECTION INTRAVENOUS
Status: DISCONTINUED | OUTPATIENT
Start: 2022-10-24 | End: 2022-10-24

## 2022-10-24 RX ORDER — ONDANSETRON 2 MG/ML
INJECTION INTRAMUSCULAR; INTRAVENOUS
Status: DISCONTINUED | OUTPATIENT
Start: 2022-10-24 | End: 2022-10-24

## 2022-10-24 RX ORDER — ROCURONIUM BROMIDE 10 MG/ML
INJECTION, SOLUTION INTRAVENOUS
Status: DISCONTINUED | OUTPATIENT
Start: 2022-10-24 | End: 2022-10-24

## 2022-10-24 RX ADMIN — FENTANYL CITRATE 50 MCG: 50 INJECTION, SOLUTION INTRAMUSCULAR; INTRAVENOUS at 08:10

## 2022-10-24 RX ADMIN — SUCCINYLCHOLINE CHLORIDE 120 MG: 20 INJECTION, SOLUTION INTRAMUSCULAR; INTRAVENOUS at 08:10

## 2022-10-24 RX ADMIN — SODIUM CHLORIDE, SODIUM LACTATE, POTASSIUM CHLORIDE, AND CALCIUM CHLORIDE: .6; .31; .03; .02 INJECTION, SOLUTION INTRAVENOUS at 09:10

## 2022-10-24 RX ADMIN — FENTANYL CITRATE 50 MCG: 50 INJECTION, SOLUTION INTRAMUSCULAR; INTRAVENOUS at 10:10

## 2022-10-24 RX ADMIN — SODIUM CHLORIDE, SODIUM LACTATE, POTASSIUM CHLORIDE, AND CALCIUM CHLORIDE: .6; .31; .03; .02 INJECTION, SOLUTION INTRAVENOUS at 08:10

## 2022-10-24 RX ADMIN — ONDANSETRON 4 MG: 2 INJECTION, SOLUTION INTRAMUSCULAR; INTRAVENOUS at 08:10

## 2022-10-24 RX ADMIN — PROPOFOL 200 MG: 10 INJECTION, EMULSION INTRAVENOUS at 08:10

## 2022-10-24 RX ADMIN — ROCURONIUM BROMIDE 5 MG: 10 INJECTION, SOLUTION INTRAVENOUS at 08:10

## 2022-10-24 RX ADMIN — CLINDAMYCIN PHOSPHATE 900 MG: 18 INJECTION, SOLUTION INTRAVENOUS at 08:10

## 2022-10-24 RX ADMIN — DEXAMETHASONE SODIUM PHOSPHATE 4 MG: 4 INJECTION, SOLUTION INTRAMUSCULAR; INTRAVENOUS at 08:10

## 2022-10-24 RX ADMIN — MIDAZOLAM 2 MG: 1 INJECTION INTRAMUSCULAR; INTRAVENOUS at 08:10

## 2022-10-24 RX ADMIN — LIDOCAINE HYDROCHLORIDE 50 MG: 20 INJECTION, SOLUTION INTRAVENOUS at 08:10

## 2022-10-24 NOTE — DISCHARGE SUMMARY
[facility] (Hospital)  Discharge Note  Short Stay    Procedure(s) (LRB):  FUSION, JOINT, TOE (Bilateral)  OSTEOTOMY, METATARSAL BONE (Bilateral)      OUTCOME: Patient tolerated treatment/procedure well without complication and is now ready for discharge.    DISPOSITION: Home or Self Care    FINAL DIAGNOSIS:      * Hammer toes of both feet [M20.41, M20.42]     * Pain in toes of both feet [M79.674, M79.675]    FOLLOWUP: In clinic    DISCHARGE INSTRUCTIONS:    Weightbearing Status and Wound care:  1. When walking, wear the surgical shoe or walking boot at all times.    2. During daytime/awake hours, if a CAST or POSTERIOR SPLINT is in place, ice the posterior aspect of the leg, behind the knee, 20 minutes on (w/ ice) and followed by 20 minutes off (w/o ice) until follow up; repeat accordingly until follow up. IF no CAST or POSTERIOR SPLINT is in place, ice the anterior aspect of the ankle, in a similar manner. During night time/sleep hours, simply elevating the limb above the level of the heart is sufficient.     3. Elevate the extremity above the level of the heart at all times when sitting.    4. Take the pain mediations as prescribed for the initial 3 or so days, then only as needed.    5. If no overt medical contra-indication, take Motrin or Advil or Ibuprofen or Aleve in between the pain medication doses.    7. Do not change the dressings.    8. Do not get the dressings wet.     9. Please be reminded of the harmful effects of nicotine/tobacco/cigarette/cigar/marijuana smoking, especially in relation to the lower extremity, particularly in reference to post operative healing. I do rec. complete or near complete cessation of any or all of the aforementioned until you are well out of the post-operative period.    10. If you were prescribed more than one short acting opioid, e.g., Dilaudid with Percocet or Norco or Tramadol, the Dilaudid (Hydromorphone) is to be taken during the immediate initial days s/p, at an  interval of every 6 hours or 5 hours or 4 hours, as needed for pain control. Once you are complete with the Dilaudid (Hydromorphone), you may/should convert to the secondary medication. DO NOT take both medications together at any time. It is not advisable to start with the less potent medication, Percocet or Norco or Tramadol, and then convert to the stronger medication. If you do start with the less potent medication, this should be the opioid therapy/drug of choice without ever converting back to the Dilaudid. You or your family member or friend should monitor for over sedation and/or respiratory depression while medicating.         TIME SPENT ON DISCHARGE: 10 minutes

## 2022-10-24 NOTE — ANESTHESIA POSTPROCEDURE EVALUATION
Anesthesia Post Evaluation    Patient: Laura Vora    Procedure(s) Performed: Procedure(s) (LRB):  FUSION, JOINT, TOE (Bilateral)  OSTEOTOMY, METATARSAL BONE (Bilateral)    Final Anesthesia Type: general      Patient location during evaluation: PACU  Patient participation: Yes- Able to Participate  Level of consciousness: awake  Post-procedure vital signs: reviewed and stable  Pain management: adequate  Airway patency: patent    PONV status at discharge: No PONV  Anesthetic complications: no      Cardiovascular status: hemodynamically stable  Respiratory status: unassisted  Hydration status: euvolemic  Follow-up not needed.          Vitals Value Taken Time   /74 10/24/22 1134   Temp 37.3 °C (99.2 °F) 10/24/22 1133   Pulse 90 10/24/22 1138   Resp 75 10/24/22 1137   SpO2 97 % 10/24/22 1138   Vitals shown include unvalidated device data.      Event Time   Out of Recovery 11:40:10         Pain/Chasity Score: Chasity Score: 10 (10/24/2022 11:30 AM)

## 2022-10-24 NOTE — BRIEF OP NOTE
[facility] ([facility])  Brief Operative Note    Surgery Date: 10/24/2022     Surgeon(s) and Role:     * Derrick Patel DPM - Primary    Assisting Surgeon: None    Pre-op Diagnosis:  Hammer toes of both feet [M20.41, M20.42]  Pain in toes of both feet [M79.674, M79.675]    Post-op Diagnosis:  Post-Op Diagnosis Codes:     * Hammer toes of both feet [M20.41, M20.42]     * Pain in toes of both feet [M79.674, M79.675]    Procedure(s) (LRB):  FUSION, JOINT, TOE (Bilateral)  OSTEOTOMY, METATARSAL BONE (Bilateral)    Anesthesia: General    Operative Findings: As per Dx. CFT WNL s/p.     Estimated Blood Loss: * No values recorded between 10/24/2022  8:45 AM and 10/24/2022 10:52 AM *         Specimens:   Specimen (24h ago, onward)      None              Discharge Note    OUTCOME: Patient tolerated treatment/procedure well without complication and is now ready for discharge.    DISPOSITION: Home or Self Care    FINAL DIAGNOSIS:      * Hammer toes of both feet [M20.41, M20.42]     * Pain in toes of both feet [M79.674, M79.675]    FOLLOWUP: In clinic    DISCHARGE INSTRUCTIONS:   Weightbearing Status and Wound care:  1. When walking, wear the surgical shoe or walking boot at all times.    2. During daytime/awake hours, if a CAST or POSTERIOR SPLINT is in place, ice the posterior aspect of the leg, behind the knee, 20 minutes on (w/ ice) and followed by 20 minutes off (w/o ice) until follow up; repeat accordingly until follow up. IF no CAST or POSTERIOR SPLINT is in place, ice the anterior aspect of the ankle, in a similar manner. During night time/sleep hours, simply elevating the limb above the level of the heart is sufficient.     3. Elevate the extremity above the level of the heart at all times when sitting.    4. Take the pain mediations as prescribed for the initial 3 or so days, then only as needed.    5. If no overt medical contra-indication, take Motrin or Advil or Ibuprofen or Aleve in between the pain medication  doses.    7. Do not change the dressings.    8. Do not get the dressings wet.     9. Please be reminded of the harmful effects of nicotine/tobacco/cigarette/cigar/marijuana smoking, especially in relation to the lower extremity, particularly in reference to post operative healing. I do rec. complete or near complete cessation of any or all of the aforementioned until you are well out of the post-operative period.    10. If you were prescribed more than one short acting opioid, e.g., Dilaudid with Percocet or Norco or Tramadol, the Dilaudid (Hydromorphone) is to be taken during the immediate initial days s/p, at an interval of every 6 hours or 5 hours or 4 hours, as needed for pain control. Once you are complete with the Dilaudid (Hydromorphone), you may/should convert to the secondary medication. DO NOT take both medications together at any time. It is not advisable to start with the less potent medication, Percocet or Norco or Tramadol, and then convert to the stronger medication. If you do start with the less potent medication, this should be the opioid therapy/drug of choice without ever converting back to the Dilaudid. You or your family member or friend should monitor for over sedation and/or respiratory depression while medicating.

## 2022-10-24 NOTE — ANESTHESIA PREPROCEDURE EVALUATION
10/24/2022  Laura Vora is a 52 y.o., female     Patient Active Problem List   Diagnosis    Severe sepsis    Essential hypertension    Tobacco abuse    Diverticulitis of large intestine without perforation or abscess    Bronchitis, chronic    Gram-negative bacteremia     Past Medical History:   Diagnosis Date    Asthma     Bronchitis     Diverticulitis     HTN (hypertension)     Obese     Tobacco use      Past Surgical History:   Procedure Laterality Date    APPENDECTOMY      CHOLECYSTECTOMY      HYSTERECTOMY             Pre-op Assessment    I have reviewed the Patient Summary Reports.    I have reviewed the NPO Status.   I have reviewed the Medications.     Review of Systems  Anesthesia Hx:  No problems with previous Anesthesia  History of prior surgery of interest to airway management or planning: Previous anesthesia: General  Denies Personal Hx of Anesthesia complications.   Social:  Smoker Patient reports recently cutting back to 0.5 ppd - no recent URI symptoms    Hematology/Oncology:  Hematology Normal   Oncology Normal     EENT/Dental:EENT/Dental Normal   Cardiovascular:   Hypertension ECG has been reviewed. EKG (10/24/22):  Normal sinus rhythm   Normal ECG   When compared with No significant change was found   Pulmonary:   Asthma Hx of chronic bronchitis; not on daily meds; uses Albuterol for rescue - has not required use in > 3 months   Renal/:  Renal/ Normal     Hepatic/GI:  Hepatic/GI Normal    Musculoskeletal:  Musculoskeletal Normal    Neurological:  Neurology Normal    Endocrine:  Endocrine Normal        Chemistry        Component Value Date/Time     09/21/2022 1213    K 4.2 09/21/2022 1213     09/21/2022 1213    CO2 26 09/21/2022 1213    BUN 8 09/21/2022 1213    CREATININE 0.8 09/21/2022 1213    GLU 85 09/21/2022 1213        Component Value Date/Time     CALCIUM 9.7 09/21/2022 1213    ALKPHOS 96 12/10/2021 1104    AST 24 12/10/2021 1104    ALT 28 12/10/2021 1104    BILITOT 0.4 12/10/2021 1104    ESTGFRAFRICA >60.0 12/10/2021 1104    EGFRNONAA >60.0 12/10/2021 1104        Lab Results   Component Value Date    WBC 11.17 09/21/2022    HGB 14.1 09/21/2022    HCT 44.7 09/21/2022    MCV 94 09/21/2022     09/21/2022           Physical Exam  General: Cooperative, Alert and Oriented  Morbid obesity  Airway:  Mallampati: III   Mouth Opening: Normal  TM Distance: 4 - 6 cm  Tongue: Large  Neck ROM: Normal ROM    Dental:  Intact  Patient denies any currently loose or chipped teeth; Patient denies any removable dental appliances  Chest/Lungs:  Clear to auscultation, Normal Respiratory Rate        Anesthesia Plan  Type of Anesthesia, risks & benefits discussed:    Anesthesia Type: MAC, Gen Supraglottic Airway, Gen ETT  Intra-op Monitoring Plan: Standard ASA Monitors  Post Op Pain Control Plan: multimodal analgesia and IV/PO Opioids PRN  Induction:  IV  Airway Plan: Direct  Informed Consent: Informed consent signed with the Patient and all parties understand the risks and agree with anesthesia plan.  All questions answered.   ASA Score: 3    Ready For Surgery From Anesthesia Perspective.     .

## 2022-10-24 NOTE — ANESTHESIA PROCEDURE NOTES
Intubation    Date/Time: 10/24/2022 8:26 AM  Performed by: Everton Lopez CRNA  Authorized by: Boy Salazar MD     Intubation:     Induction:  Intravenous    Intubated:  Postinduction    Mask Ventilation:  Easy mask    Attempts:  1    Attempted By:  CRNA    Method of Intubation:  Direct    Blade:  Salgado 2    Laryngeal View Grade: Grade IIA - cords partially seen      Difficult Airway Encountered?: No      Complications:  None    Airway Device Size:  7.5    Style/Cuff Inflation:  Cuffed (inflated to minimal occlusive pressure)    Tube secured:  22    Secured at:  The lips    Placement Verified By:  Capnometry    Complicating Factors:  None    Findings Post-Intubation:  BS equal bilateral

## 2022-10-24 NOTE — OP NOTE
[facility]  Podiatric Medicine & Surgery  Operative Report    SUMMARY     Date of Procedure: 10/24/2022    Procedure: Procedure(s):  FUSION, JOINT, TOE  OSTEOTOMY, METATARSAL BONE    Surgeon(s) and Role: Surgeon(s) and Role:     * Derrick Patel DPM - Primary    Pre-Operative Diagnosis: Pre-Op Diagnosis Codes:     * Hammer toes of both feet [M20.41, M20.42]     * Pain in toes of both feet [M79.674, M79.675]    Post-Operative Diagnosis: Post-Op Diagnosis Codes:     * Hammer toes of both feet [M20.41, M20.42]     * Pain in toes of both feet [M79.674, M79.675]    Anesthesia: General    Technical Procedures Used:   1. Fusion, 2nd digit, LLE.   2. Capsulotomy; MTPJ, w/ or w/o Tenorrhaphy, LLE, 2nd.   3. Fusion, 3rd digit, LLE.   4. Capsulotomy; MTPJ, w/ or w/o Tenorrhaphy, LLE, 3rd.   5. Fusion, 2nd digit, RLE.   6. Capsulotomy; MTPJ, w/ or w/o Tenorrhaphy, RLE, 2nd.   7. Fusion, 3rd digit, RLE.   8. Capsulotomy; MTPJ, w/ or w/o Tenorrhaphy, RLE, 3rd.    Description of the Findings of the Procedure: The patient was seen in the Holding Room. The risks, benefits, complications, treatment options, and expected outcomes were discussed with the patient. The risks and potential complications of their problem and purposed treatment include but are not limited to infection, nerve injury, vascular injury, nonunion/malunion/delayed union of the surgical site, persistent pain, potential skin necrosis, deep vein thrombosis, possible pulmonary embolus, complications of the anesthetics and failure of the implant.  The patient concurred with the proposed plan, giving informed consent. The patient is aware that the procedure may be a part of a staged collection of procedures for definitive cure and/or alleviation of symptoms. The site of surgery properly noted/marked. Preoperative intravenous antibiotics are hanging at bedside, and are currently being administered via the heparin lock. The patient was taken  to Operating Suite.    Once in the operative suite, the patient is transferred onto the operative table in the supine position. A TIME-OUT is taken as per protocol to identify the proper patient, procedure to be performed, and laterality.  The patient is properly positioned on the operating room table for ease of dissection and for any ancillary imaging. Nursing and ancillary OR staff prepared the patient for the procedure. The patient is adequately sedated by the Attending Anesthesiologist and/or covering CRNA.  Following this, a preoperative regional/local block was given to the B/L 2nd-4th mid-shaft of the metatarsals is given. Next, the operative limb was rendered sterile using chlorhexidine paint and scrub.  Sterile sheets and drapes were applied thereafter. Another TIME-OUT is taken as per protocol to identify the proper patient, procedure to be performed, and laterality.      Following this, sharp skin incision at the dorsal aspect of the LLE 2nd and 3rd digit/ray/MTPJ with a scalpel blade. Deep dissection with a large tenotomy scissor. The PIPJ is entered with a scalpel blade.  The medial and lateral collateral ligaments were severed. The extensor tendon is removed from the dorsal surface of the head of the proximal interphalangeal joint and is retracted proximally.  The extensor epperson/sling apparatus is also severed from its medial and lateral attachments to the base of proximal phalanx, and is retracted proximally.  Copious irrigation with sterile saline solution.  The head of the proximal phalanx is then transected with a sagittal saw.  The base of the middle phalanx was then transected in similar fashion.     A Kelikian push up test is then performed, and is determined that further corrective measures would be necessitated due to persistence of contracture(s).     Following this, capsulotomy about the 2nd and 3rd MTPJ with a scalpel blade. Capsulotomy of the medial, dorsal, lateral capsule as well as  collateral ligaments for correction of digital contracture at the metatarsophalangeal joint. Copious lavage with sterile saline solution. EDL z-lengthening is performed. An EDB tenotomy is performed.  Following this a another Kelikian push up test is performed and contractures are reduced.    At this point an arthrodesis of the PIPJ was performed with a 0.045/0.062 k-wire. The wire is driven into the metatarsal bone. The tendon is repaired across the proximal interphalangeal joint using 2-0 Vicryl suture. The skin is closed using Nylon. Post-skin closure, CFT to the digits are WNL.    Following this, sharp skin incision at the dorsal aspect of the RLE 2nd and 3rd digit/ray/MTPJ with a scalpel blade. Deep dissection with a large tenotomy scissor. The PIPJ is entered with a scalpel blade.  The medial and lateral collateral ligaments were severed. The extensor tendon is removed from the dorsal surface of the head of the proximal interphalangeal joint and is retracted proximally.  The extensor epperson/sling apparatus is also severed from its medial and lateral attachments to the base of proximal phalanx, and is retracted proximally.  Copious irrigation with sterile saline solution.  The head of the proximal phalanx is then transected with a sagittal saw.  The base of the middle phalanx was then transected in similar fashion.     A Kelikian push up test is then performed, and is determined that further corrective measures would be necessitated due to persistence  of contracture(s).     Following this, capsulotomy about the 2nd and 3rd MTPJ with a scalpel blade. Capsulotomy of the medial, dorsal, lateral capsule as well as collateral ligaments for correction of digital contracture at the metatarsophalangeal joint. Copious lavage with sterile saline solution. EDL z-lengthening is performed. An EDB tenotomy is performed.  Following this a another Kelikian push up test is performed and no further contractures are noted.    At  this point an arthrodesis of the PIPJ was performed with a 0.045/0.062 k-wire. The tendon is repaired across the proximal interphalangeal joint using 2-0 Vicryl suture. The skin is closed using Nylon. Post-skin closure, CFT to the digits are WNL.    All incisions are dressed with Xeroform/Adaptic nonadherent dressings followed by sterile 4 x 4 gauze, abdominal pad, Thuy/Kerlix and light ACE. Wade Compression is applied..     The anesthesia is weaned. There were no complications to this procedure. Any final necessary imaging to be performed in the PACU if it was not performed here in the OR suite.  The patient is transferred to the [facility] bed/stretcher, [facility]. The patient is transferred to the PACU.    Significant Surgical Tasks Conducted by the Assistant(s), if Applicable: N/A    Complications: * No complications entered in OR log *    Estimated Blood Loss (EBL): Minimal    Drains: N/A    Implants:   Implant Name Type Inv. Item Serial No.  Lot No. LRB No. Used Action   WIRE C TROCAR TIP .045 - SN/A  WIRE C TROCAR TIP .045 N/A [employer] [id number] Left 1 Implanted   WIRE C TROCAR TIP .045 - SN/A  WIRE C TROCAR TIP .045 N/A [employer] [id number] Left 1 Implanted   WIRE C TROCAR TIP .045 - SN/A  WIRE C TROCAR TIP .045 N/A [employer] [id number] Right 1 Implanted   WIRE C TROCAR TIP .045 - SN/A  WIRE C TROCAR TIP .045 N/A [employer] [id number] Right 1 Implanted       Specimens: * No specimens in log *    Condition: stable    Disposition: PACU - hemodynamically stable.    Attestation: I performed the procedure.

## 2022-10-24 NOTE — TRANSFER OF CARE
"Anesthesia Transfer of Care Note    Patient: Laura Vora    Procedure(s) Performed: Procedure(s) (LRB):  FUSION, JOINT, TOE (Bilateral)  OSTEOTOMY, METATARSAL BONE (Bilateral)    Patient location: PACU    Anesthesia Type: general    Transport from OR: Transported from OR on room air with adequate spontaneous ventilation    Post pain: adequate analgesia    Post assessment: no apparent anesthetic complications    Post vital signs: stable    Level of consciousness: responds to stimulation    Nausea/Vomiting: no nausea/vomiting    Complications: none    Transfer of care protocol was followed      Last vitals:   Visit Vitals  BP (!) 140/90 (BP Location: Right arm, Patient Position: Sitting)   Pulse 83   Temp 36.8 °C (98.2 °F) (Temporal)   Resp 18   Ht 5' 6" (1.676 m)   Wt 122.5 kg (270 lb 1 oz)   LMP 09/05/2014 (Exact Date)   SpO2 98%   Breastfeeding No   BMI 43.59 kg/m²     "

## 2022-10-25 ENCOUNTER — PATIENT MESSAGE (OUTPATIENT)
Dept: PODIATRY | Facility: CLINIC | Age: 52
End: 2022-10-25
Payer: MEDICAID

## 2022-10-25 VITALS
RESPIRATION RATE: 15 BRPM | TEMPERATURE: 99 F | WEIGHT: 270.06 LBS | OXYGEN SATURATION: 94 % | DIASTOLIC BLOOD PRESSURE: 62 MMHG | HEIGHT: 66 IN | SYSTOLIC BLOOD PRESSURE: 131 MMHG | HEART RATE: 90 BPM | BODY MASS INDEX: 43.4 KG/M2

## 2022-11-01 ENCOUNTER — PATIENT MESSAGE (OUTPATIENT)
Dept: PODIATRY | Facility: CLINIC | Age: 52
End: 2022-11-01
Payer: MEDICAID

## 2022-11-01 DIAGNOSIS — M20.42 HAMMER TOES OF BOTH FEET: ICD-10-CM

## 2022-11-01 DIAGNOSIS — M20.41 HAMMER TOES OF BOTH FEET: ICD-10-CM

## 2022-11-01 DIAGNOSIS — M79.674 PAIN IN TOES OF BOTH FEET: Primary | ICD-10-CM

## 2022-11-01 DIAGNOSIS — M79.675 PAIN IN TOES OF BOTH FEET: Primary | ICD-10-CM

## 2022-11-01 RX ORDER — OXYCODONE AND ACETAMINOPHEN 10; 325 MG/1; MG/1
1 TABLET ORAL EVERY 6 HOURS PRN
Qty: 28 TABLET | Refills: 0 | Status: SHIPPED | OUTPATIENT
Start: 2022-11-01 | End: 2022-11-04

## 2022-11-02 ENCOUNTER — PATIENT MESSAGE (OUTPATIENT)
Dept: PODIATRY | Facility: CLINIC | Age: 52
End: 2022-11-02
Payer: MEDICAID

## 2022-11-03 ENCOUNTER — PATIENT MESSAGE (OUTPATIENT)
Dept: PODIATRY | Facility: CLINIC | Age: 52
End: 2022-11-03
Payer: MEDICAID

## 2022-11-04 ENCOUNTER — OFFICE VISIT (OUTPATIENT)
Dept: PODIATRY | Facility: CLINIC | Age: 52
End: 2022-11-04
Payer: MEDICAID

## 2022-11-04 VITALS — BODY MASS INDEX: 43.39 KG/M2 | WEIGHT: 270 LBS | HEIGHT: 66 IN

## 2022-11-04 DIAGNOSIS — E66.01 MORBID OBESITY: ICD-10-CM

## 2022-11-04 DIAGNOSIS — Z72.0 TOBACCO ABUSE: ICD-10-CM

## 2022-11-04 DIAGNOSIS — Z09 POSTOP CHECK: Primary | ICD-10-CM

## 2022-11-04 DIAGNOSIS — M79.675 PAIN IN TOES OF BOTH FEET: ICD-10-CM

## 2022-11-04 DIAGNOSIS — M79.674 PAIN IN TOES OF BOTH FEET: ICD-10-CM

## 2022-11-04 DIAGNOSIS — M20.42 HAMMER TOES OF BOTH FEET: ICD-10-CM

## 2022-11-04 DIAGNOSIS — M20.41 HAMMER TOES OF BOTH FEET: ICD-10-CM

## 2022-11-04 PROCEDURE — 99024 PR POST-OP FOLLOW-UP VISIT: ICD-10-PCS | Mod: ,,, | Performed by: PODIATRIST

## 2022-11-04 PROCEDURE — 1159F PR MEDICATION LIST DOCUMENTED IN MEDICAL RECORD: ICD-10-PCS | Mod: CPTII,,, | Performed by: PODIATRIST

## 2022-11-04 PROCEDURE — 99024 POSTOP FOLLOW-UP VISIT: CPT | Mod: ,,, | Performed by: PODIATRIST

## 2022-11-04 PROCEDURE — 1160F RVW MEDS BY RX/DR IN RCRD: CPT | Mod: CPTII,,, | Performed by: PODIATRIST

## 2022-11-04 PROCEDURE — 3008F BODY MASS INDEX DOCD: CPT | Mod: CPTII,,, | Performed by: PODIATRIST

## 2022-11-04 PROCEDURE — 1160F PR REVIEW ALL MEDS BY PRESCRIBER/CLIN PHARMACIST DOCUMENTED: ICD-10-PCS | Mod: CPTII,,, | Performed by: PODIATRIST

## 2022-11-04 PROCEDURE — 99999 PR PBB SHADOW E&M-EST. PATIENT-LVL III: CPT | Mod: PBBFAC,,, | Performed by: PODIATRIST

## 2022-11-04 PROCEDURE — 3008F PR BODY MASS INDEX (BMI) DOCUMENTED: ICD-10-PCS | Mod: CPTII,,, | Performed by: PODIATRIST

## 2022-11-04 PROCEDURE — 99999 PR PBB SHADOW E&M-EST. PATIENT-LVL III: ICD-10-PCS | Mod: PBBFAC,,, | Performed by: PODIATRIST

## 2022-11-04 PROCEDURE — 1159F MED LIST DOCD IN RCRD: CPT | Mod: CPTII,,, | Performed by: PODIATRIST

## 2022-11-04 PROCEDURE — 99213 OFFICE O/P EST LOW 20 MIN: CPT | Mod: PBBFAC | Performed by: PODIATRIST

## 2022-11-04 RX ORDER — IBUPROFEN 800 MG/1
800 TABLET ORAL 2 TIMES DAILY
Qty: 60 TABLET | Refills: 1 | Status: SHIPPED | OUTPATIENT
Start: 2022-11-04 | End: 2022-11-17 | Stop reason: SDUPTHER

## 2022-11-04 RX ORDER — OXYCODONE AND ACETAMINOPHEN 10; 325 MG/1; MG/1
1 TABLET ORAL EVERY 6 HOURS PRN
Qty: 28 TABLET | Refills: 0 | Status: SHIPPED | OUTPATIENT
Start: 2022-11-04 | End: 2022-11-11

## 2022-11-04 NOTE — PROGRESS NOTES
Subjective:       Patient ID: Laura Vora is a 52 y.o. female.    Chief Complaint: Post-op Evaluation (S/p FUSION, JOINT, TOE (Bilateral) OSTEOTOMY, METATARSAL BONE (Bilateral), DOS 10/24/2022, currently rates pain 1/10)      HPI:  Laura Vora presents to the office today, s/p 10/24/22 B/L 2nd and 3rd hammer toe correction. WB with surgical shoes. States mild pains. States NSAIDs.     No results found for: HGBA1C    Review of patient's allergies indicates:   Allergen Reactions    Iodine and iodide containing products Hives    Bactrim [sulfamethoxazole-trimethoprim] Hives    Losartan potassium Other (See Comments)       Past Medical History:   Diagnosis Date    Asthma     Bronchitis     Diverticulitis     HTN (hypertension)     Obese     Tobacco use        Family History   Problem Relation Age of Onset    Hypertension Unknown     Diabetes Mother     Hypertension Mother     Heart disease Mother     Cancer Sister     Breast cancer Sister     Cancer Paternal Aunt     Breast cancer Maternal Aunt        Social History     Socioeconomic History    Marital status: Single   Tobacco Use    Smoking status: Every Day     Packs/day: 0.25     Types: Cigarettes    Smokeless tobacco: Never   Substance and Sexual Activity    Alcohol use: Yes     Comment: occasionally    Drug use: No       Past Surgical History:   Procedure Laterality Date    APPENDECTOMY      CHOLECYSTECTOMY      HYSTERECTOMY      OSTEOTOMY OF METATARSAL BONE Bilateral 10/24/2022    Procedure: OSTEOTOMY, METATARSAL BONE;  Surgeon: Derrick Patel DPM;  Location: [facility];  Service: Podiatry;  Laterality: Bilateral;       Review of Systems   Constitutional:  Negative for chills, fatigue and fever.   HENT:  Negative for hearing loss.    Eyes:  Negative for photophobia and visual disturbance.   Respiratory:  Negative for cough, chest tightness, shortness of breath and wheezing.    Cardiovascular:  Negative for chest pain and palpitations.   Gastrointestinal:   "Negative for constipation, diarrhea, nausea and vomiting.   Endocrine: Negative for cold intolerance and heat intolerance.   Genitourinary:  Negative for flank pain.   Musculoskeletal:  Negative for neck pain and neck stiffness.   Neurological:  Negative for light-headedness and headaches.   Psychiatric/Behavioral:  Negative for sleep disturbance.         Objective:   Ht 5' 6" (1.676 m)   Wt 122.5 kg (270 lb)   LMP 09/05/2014 (Exact Date)   BMI 43.58 kg/m²     X-Ray Foot AP Bilateral  Narrative: EXAMINATION:  XR FOOT AP BILAT    CLINICAL HISTORY:  intraop;    TECHNIQUE:  Fluoroscopic OR support only provided for Dr. Patel for toe surgery.  Please see detailed surgeon report in the electronic medical record.    COMPARISON:  09/14/2022    FINDINGS:  Fluoroscopic time: 19 seconds    Fluoroscopic images saved: 3  Impression: As above.    Electronically signed by: Ariel Sheth  Date:    10/24/2022  Time:    10:39  SURG FL Surgery Fluoro Usage  See OP Notes for results.     IMPRESSION: See OP Notes for results.     This procedure was auto-finalized by: Virtual Radiologist       Physical Exam  LOWER EXTREMITY PHYSICAL EXAMINATION    NEUROLOGY: Proprioception is intact. Sensation to light touch is intact. Lashay-incisional sensation is intact.    VASCULAR: No ipsilateral calf pain or tenderness is noted with palpation and compression. No palpable cords noted.    DERMATOLOGY: No evidence of wound healing complications are noted. K-wire insertion sites are WNL.    ORTHOPEDIC: No edema is noted. Rectus 2nd and 3rd toes are noted.     Assessment:     1. Postop check    2. Hammer toes of both feet    3. Pain in toes of both feet    4. Tobacco abuse    5. Morbid obesity          Plan:     Postop check  -     oxyCODONE-acetaminophen (PERCOCET)  mg per tablet; Take 1 tablet by mouth every 6 (six) hours as needed for Pain.  Dispense: 28 tablet; Refill: 0    Hammer toes of both feet    Pain in toes of both feet  -     " oxyCODONE-acetaminophen (PERCOCET)  mg per tablet; Take 1 tablet by mouth every 6 (six) hours as needed for Pain.  Dispense: 28 tablet; Refill: 0  -     ibuprofen (ADVIL,MOTRIN) 800 MG tablet; Take 1 tablet (800 mg total) by mouth 2 (two) times daily.  Dispense: 60 tablet; Refill: 1    Tobacco abuse    Morbid obesity    Thorough discussion is had with the patient today, concerning the diagnosis, its etiology, and the treatment algorithm at present.     Sutures to remain intact.    RICE therapy.    WB with surgical shoe.    NSAIDs.    Patient is counseled and reminded concerning the importance of good nutrition and healthy eating habits, which may include blood sugar control, to prevent and/or help podiatric foot and ankle complications.    Did discuss in detail the harmful effects of nicotine/tobacco/cigarette/ marijuana smoking, especially in relation to the lower extremity. I do rec. consultation with primary care provider for further discussed of smoking cessation methods. Smoking & Tobacco use cessation couseling was rendered at today's visit; intermediate, bewteen 3 and 10 minutes.            No future appointments.

## 2022-11-11 ENCOUNTER — PATIENT MESSAGE (OUTPATIENT)
Dept: PODIATRY | Facility: CLINIC | Age: 52
End: 2022-11-11
Payer: MEDICAID

## 2022-11-13 ENCOUNTER — PATIENT MESSAGE (OUTPATIENT)
Dept: PODIATRY | Facility: CLINIC | Age: 52
End: 2022-11-13
Payer: MEDICAID

## 2022-11-13 DIAGNOSIS — Z09 POSTOP CHECK: ICD-10-CM

## 2022-11-13 DIAGNOSIS — M20.42 HAMMER TOES OF BOTH FEET: ICD-10-CM

## 2022-11-13 DIAGNOSIS — M20.42 HAMMER TOES OF BOTH FEET: Primary | ICD-10-CM

## 2022-11-13 DIAGNOSIS — M20.41 HAMMER TOES OF BOTH FEET: Primary | ICD-10-CM

## 2022-11-13 DIAGNOSIS — M79.675 PAIN IN TOES OF BOTH FEET: ICD-10-CM

## 2022-11-13 DIAGNOSIS — M20.41 HAMMER TOES OF BOTH FEET: ICD-10-CM

## 2022-11-13 DIAGNOSIS — M79.674 PAIN IN TOES OF BOTH FEET: ICD-10-CM

## 2022-11-13 RX ORDER — OXYCODONE AND ACETAMINOPHEN 5; 325 MG/1; MG/1
1 TABLET ORAL EVERY 6 HOURS PRN
Qty: 28 TABLET | Refills: 0 | Status: SHIPPED | OUTPATIENT
Start: 2022-11-13 | End: 2022-11-13

## 2022-11-13 RX ORDER — OXYCODONE AND ACETAMINOPHEN 5; 325 MG/1; MG/1
1 TABLET ORAL EVERY 6 HOURS PRN
Qty: 28 TABLET | Refills: 0 | Status: SHIPPED | OUTPATIENT
Start: 2022-11-13 | End: 2022-11-17 | Stop reason: SDUPTHER

## 2022-11-14 ENCOUNTER — TELEPHONE (OUTPATIENT)
Dept: PODIATRY | Facility: CLINIC | Age: 52
End: 2022-11-14
Payer: MEDICAID

## 2022-11-17 ENCOUNTER — OFFICE VISIT (OUTPATIENT)
Dept: PODIATRY | Facility: CLINIC | Age: 52
End: 2022-11-17
Payer: MEDICAID

## 2022-11-17 DIAGNOSIS — M20.42 HAMMER TOES OF BOTH FEET: ICD-10-CM

## 2022-11-17 DIAGNOSIS — Z09 POSTOP CHECK: Primary | ICD-10-CM

## 2022-11-17 DIAGNOSIS — M20.41 HAMMER TOES OF BOTH FEET: ICD-10-CM

## 2022-11-17 DIAGNOSIS — M79.675 PAIN IN TOES OF BOTH FEET: ICD-10-CM

## 2022-11-17 DIAGNOSIS — M79.674 PAIN IN TOES OF BOTH FEET: ICD-10-CM

## 2022-11-17 PROCEDURE — 99999 PR PBB SHADOW E&M-EST. PATIENT-LVL II: ICD-10-PCS | Mod: PBBFAC,,, | Performed by: PODIATRIST

## 2022-11-17 PROCEDURE — 1160F RVW MEDS BY RX/DR IN RCRD: CPT | Mod: CPTII,,, | Performed by: PODIATRIST

## 2022-11-17 PROCEDURE — 1160F PR REVIEW ALL MEDS BY PRESCRIBER/CLIN PHARMACIST DOCUMENTED: ICD-10-PCS | Mod: CPTII,,, | Performed by: PODIATRIST

## 2022-11-17 PROCEDURE — 1159F MED LIST DOCD IN RCRD: CPT | Mod: CPTII,,, | Performed by: PODIATRIST

## 2022-11-17 PROCEDURE — 1159F PR MEDICATION LIST DOCUMENTED IN MEDICAL RECORD: ICD-10-PCS | Mod: CPTII,,, | Performed by: PODIATRIST

## 2022-11-17 PROCEDURE — 99024 PR POST-OP FOLLOW-UP VISIT: ICD-10-PCS | Mod: ,,, | Performed by: PODIATRIST

## 2022-11-17 PROCEDURE — 99999 PR PBB SHADOW E&M-EST. PATIENT-LVL II: CPT | Mod: PBBFAC,,, | Performed by: PODIATRIST

## 2022-11-17 PROCEDURE — 99212 OFFICE O/P EST SF 10 MIN: CPT | Mod: PBBFAC | Performed by: PODIATRIST

## 2022-11-17 PROCEDURE — 99024 POSTOP FOLLOW-UP VISIT: CPT | Mod: ,,, | Performed by: PODIATRIST

## 2022-11-17 RX ORDER — GABAPENTIN 100 MG/1
100 CAPSULE ORAL 2 TIMES DAILY
Qty: 60 CAPSULE | Refills: 0 | Status: SHIPPED | OUTPATIENT
Start: 2022-11-17 | End: 2023-01-13

## 2022-11-17 RX ORDER — OXYCODONE AND ACETAMINOPHEN 5; 325 MG/1; MG/1
1 TABLET ORAL EVERY 6 HOURS PRN
Qty: 28 TABLET | Refills: 0 | Status: SHIPPED | OUTPATIENT
Start: 2022-11-17 | End: 2022-11-28

## 2022-11-17 RX ORDER — IBUPROFEN 800 MG/1
800 TABLET ORAL 2 TIMES DAILY
Qty: 60 TABLET | Refills: 1 | Status: SHIPPED | OUTPATIENT
Start: 2022-11-17 | End: 2023-01-13 | Stop reason: SDUPTHER

## 2022-11-17 NOTE — PROGRESS NOTES
Subjective:       Patient ID: Laura Vora is a 52 y.o. female.    Chief Complaint: Post-op Evaluation      HPI:  Laura Vora presents to the office today, s/p 10/24/22 B/L 2nd and 3rd hammer toe correction. WB with surgical shoes. States mild pains. States NSAIDs.     No results found for: HGBA1C    Review of patient's allergies indicates:   Allergen Reactions    Iodine and iodide containing products Hives    Bactrim [sulfamethoxazole-trimethoprim] Hives    Losartan potassium Other (See Comments)       Past Medical History:   Diagnosis Date    Asthma     Bronchitis     Diverticulitis     HTN (hypertension)     Obese     Tobacco use        Family History   Problem Relation Age of Onset    Hypertension Unknown     Diabetes Mother     Hypertension Mother     Heart disease Mother     Cancer Sister     Breast cancer Sister     Cancer Paternal Aunt     Breast cancer Maternal Aunt        Social History     Socioeconomic History    Marital status: Single   Tobacco Use    Smoking status: Every Day     Packs/day: 0.25     Types: Cigarettes    Smokeless tobacco: Never   Substance and Sexual Activity    Alcohol use: Yes     Comment: occasionally    Drug use: No       Past Surgical History:   Procedure Laterality Date    APPENDECTOMY      CHOLECYSTECTOMY      HYSTERECTOMY      OSTEOTOMY OF METATARSAL BONE Bilateral 10/24/2022    Procedure: OSTEOTOMY, METATARSAL BONE;  Surgeon: Derrick Patel DPM;  Location: [facility];  Service: Podiatry;  Laterality: Bilateral;       Review of Systems   Constitutional:  Negative for chills, fatigue and fever.   HENT:  Negative for hearing loss.    Eyes:  Negative for photophobia and visual disturbance.   Respiratory:  Negative for cough, chest tightness, shortness of breath and wheezing.    Cardiovascular:  Negative for chest pain and palpitations.   Gastrointestinal:  Negative for constipation, diarrhea, nausea and vomiting.   Endocrine: Negative for cold intolerance and heat  intolerance.   Genitourinary:  Negative for flank pain.   Musculoskeletal:  Negative for neck pain and neck stiffness.   Neurological:  Negative for light-headedness and headaches.   Psychiatric/Behavioral:  Negative for sleep disturbance.         Objective:   LMP 09/05/2014 (Exact Date)         Physical Exam  LOWER EXTREMITY PHYSICAL EXAMINATION    DERMATOLOGY: No evidence of wound healing complications are noted s/p k-wire removal and suture removal.    ORTHOPEDIC: No edema is noted. Rectus 2nd and 3rd toes are noted.     Assessment:     1. Postop check    2. Hammer toes of both feet    3. Pain in toes of both feet            Plan:     Postop check  -     oxyCODONE-acetaminophen (PERCOCET) 5-325 mg per tablet; Take 1 tablet by mouth every 6 (six) hours as needed for Pain.  Dispense: 28 tablet; Refill: 0    Hammer toes of both feet  -     oxyCODONE-acetaminophen (PERCOCET) 5-325 mg per tablet; Take 1 tablet by mouth every 6 (six) hours as needed for Pain.  Dispense: 28 tablet; Refill: 0    Pain in toes of both feet  -     gabapentin (NEURONTIN) 100 MG capsule; Take 1 capsule (100 mg total) by mouth 2 (two) times daily.  Dispense: 60 capsule; Refill: 0  -     oxyCODONE-acetaminophen (PERCOCET) 5-325 mg per tablet; Take 1 tablet by mouth every 6 (six) hours as needed for Pain.  Dispense: 28 tablet; Refill: 0  -     ibuprofen (ADVIL,MOTRIN) 800 MG tablet; Take 1 tablet (800 mg total) by mouth 2 (two) times daily.  Dispense: 60 tablet; Refill: 1      Thorough discussion is had with the patient today, concerning the diagnosis, its etiology, and the treatment algorithm at present.     RICE therapy.    WB with surgical shoe until 11/28/22.    NSAIDs.    Patient is counseled and reminded concerning the importance of good nutrition and healthy eating habits, which may include blood sugar control, to prevent and/or help podiatric foot and ankle complications.    Did discuss in detail the harmful effects of  nicotine/tobacco/cigarette/ marijuana smoking, especially in relation to the lower extremity. I do rec. consultation with primary care provider for further discussed of smoking cessation methods. Smoking & Tobacco use cessation couseling was rendered at today's visit; intermediate, bewteen 3 and 10 minutes.    F/U prn.           No future appointments.

## 2022-11-27 ENCOUNTER — PATIENT MESSAGE (OUTPATIENT)
Dept: PODIATRY | Facility: CLINIC | Age: 52
End: 2022-11-27
Payer: MEDICAID

## 2022-12-12 ENCOUNTER — PATIENT MESSAGE (OUTPATIENT)
Dept: PHARMACY | Facility: CLINIC | Age: 52
End: 2022-12-12
Payer: MEDICAID

## 2022-12-17 ENCOUNTER — PATIENT MESSAGE (OUTPATIENT)
Dept: PODIATRY | Facility: CLINIC | Age: 52
End: 2022-12-17
Payer: MEDICAID

## 2022-12-20 ENCOUNTER — OFFICE VISIT (OUTPATIENT)
Dept: PODIATRY | Facility: CLINIC | Age: 52
End: 2022-12-20
Payer: MEDICAID

## 2022-12-20 ENCOUNTER — PATIENT MESSAGE (OUTPATIENT)
Dept: PODIATRY | Facility: CLINIC | Age: 52
End: 2022-12-20

## 2022-12-20 DIAGNOSIS — M79.674 PAIN IN TOES OF BOTH FEET: ICD-10-CM

## 2022-12-20 DIAGNOSIS — T81.89XA DELAYED SURGICAL WOUND HEALING, INITIAL ENCOUNTER: ICD-10-CM

## 2022-12-20 DIAGNOSIS — T36.95XA ANTIBIOTIC-INDUCED YEAST INFECTION: ICD-10-CM

## 2022-12-20 DIAGNOSIS — E66.01 MORBID OBESITY: ICD-10-CM

## 2022-12-20 DIAGNOSIS — M79.675 PAIN IN TOES OF BOTH FEET: ICD-10-CM

## 2022-12-20 DIAGNOSIS — B37.9 ANTIBIOTIC-INDUCED YEAST INFECTION: ICD-10-CM

## 2022-12-20 DIAGNOSIS — Z09 POSTOP CHECK: Primary | ICD-10-CM

## 2022-12-20 DIAGNOSIS — Z72.0 TOBACCO ABUSE: ICD-10-CM

## 2022-12-20 DIAGNOSIS — M20.42 HAMMER TOES OF BOTH FEET: ICD-10-CM

## 2022-12-20 DIAGNOSIS — M20.41 HAMMER TOES OF BOTH FEET: ICD-10-CM

## 2022-12-20 PROCEDURE — 99999 PR PBB SHADOW E&M-EST. PATIENT-LVL III: CPT | Mod: PBBFAC,,, | Performed by: PODIATRIST

## 2022-12-20 PROCEDURE — 99999 PR PBB SHADOW E&M-EST. PATIENT-LVL III: ICD-10-PCS | Mod: PBBFAC,,, | Performed by: PODIATRIST

## 2022-12-20 PROCEDURE — 99213 OFFICE O/P EST LOW 20 MIN: CPT | Mod: PBBFAC | Performed by: PODIATRIST

## 2022-12-20 PROCEDURE — 1160F RVW MEDS BY RX/DR IN RCRD: CPT | Mod: CPTII,,, | Performed by: PODIATRIST

## 2022-12-20 PROCEDURE — 1159F PR MEDICATION LIST DOCUMENTED IN MEDICAL RECORD: ICD-10-PCS | Mod: CPTII,,, | Performed by: PODIATRIST

## 2022-12-20 PROCEDURE — 99024 PR POST-OP FOLLOW-UP VISIT: ICD-10-PCS | Mod: ,,, | Performed by: PODIATRIST

## 2022-12-20 PROCEDURE — 99024 POSTOP FOLLOW-UP VISIT: CPT | Mod: ,,, | Performed by: PODIATRIST

## 2022-12-20 PROCEDURE — 1159F MED LIST DOCD IN RCRD: CPT | Mod: CPTII,,, | Performed by: PODIATRIST

## 2022-12-20 PROCEDURE — 87070 CULTURE OTHR SPECIMN AEROBIC: CPT | Performed by: PODIATRIST

## 2022-12-20 PROCEDURE — 1160F PR REVIEW ALL MEDS BY PRESCRIBER/CLIN PHARMACIST DOCUMENTED: ICD-10-PCS | Mod: CPTII,,, | Performed by: PODIATRIST

## 2022-12-20 RX ORDER — DOXYCYCLINE 100 MG/1
100 CAPSULE ORAL EVERY 12 HOURS
Qty: 14 CAPSULE | Refills: 0 | Status: SHIPPED | OUTPATIENT
Start: 2022-12-20 | End: 2022-12-27

## 2022-12-20 RX ORDER — OXYCODONE AND ACETAMINOPHEN 10; 325 MG/1; MG/1
1 TABLET ORAL EVERY 6 HOURS PRN
Qty: 28 TABLET | Refills: 0 | Status: SHIPPED | OUTPATIENT
Start: 2022-12-20 | End: 2022-12-27

## 2022-12-20 RX ORDER — FLUCONAZOLE 150 MG/1
150 TABLET ORAL
Qty: 2 TABLET | Refills: 0 | Status: SHIPPED | OUTPATIENT
Start: 2022-12-20 | End: 2022-12-26

## 2022-12-20 NOTE — PROGRESS NOTES
Subjective:       Patient ID: Laura Vora is a 52 y.o. female.    Chief Complaint: Follow-up (9/10 pain to left 3rd digit and soreness to right foot. Non diabetic PCP: Dr. Benites)    HPI:  Laura Vora presents to the office today, s/p 10/24/22 B/L 2nd and 3rd hammer toe correction. WB with surgical shoe on the LLE. States mild pains with neuritis. States a minor wound at the LLE 3rd toe. States local wound care with vaseline. States NSAIDs.     No results found for: HGBA1C    Review of patient's allergies indicates:   Allergen Reactions    Iodine and iodide containing products Hives    Bactrim [sulfamethoxazole-trimethoprim] Hives    Losartan potassium Other (See Comments)       Past Medical History:   Diagnosis Date    Asthma     Bronchitis     Diverticulitis     HTN (hypertension)     Obese     Tobacco use        Family History   Problem Relation Age of Onset    Hypertension Unknown     Diabetes Mother     Hypertension Mother     Heart disease Mother     Cancer Sister     Breast cancer Sister     Cancer Paternal Aunt     Breast cancer Maternal Aunt        Social History     Socioeconomic History    Marital status: Single   Tobacco Use    Smoking status: Every Day     Packs/day: 0.25     Types: Cigarettes    Smokeless tobacco: Never   Substance and Sexual Activity    Alcohol use: Yes     Comment: occasionally    Drug use: No       Past Surgical History:   Procedure Laterality Date    APPENDECTOMY      CHOLECYSTECTOMY      HYSTERECTOMY      OSTEOTOMY OF METATARSAL BONE Bilateral 10/24/2022    Procedure: OSTEOTOMY, METATARSAL BONE;  Surgeon: Derrick Patel DPM;  Location: [facility];  Service: Podiatry;  Laterality: Bilateral;       Review of Systems   Constitutional:  Negative for chills, fatigue and fever.   HENT:  Negative for hearing loss.    Eyes:  Negative for photophobia and visual disturbance.   Respiratory:  Negative for cough, chest tightness, shortness of breath and wheezing.    Cardiovascular:   Negative for chest pain and palpitations.   Gastrointestinal:  Negative for constipation, diarrhea, nausea and vomiting.   Endocrine: Negative for cold intolerance and heat intolerance.   Genitourinary:  Negative for flank pain.   Musculoskeletal:  Negative for neck pain and neck stiffness.   Skin:  Positive for wound.   Neurological:  Negative for light-headedness and headaches.   Psychiatric/Behavioral:  Negative for sleep disturbance.         Objective:   LMP 09/05/2014 (Exact Date)         Physical Exam  LOWER EXTREMITY PHYSICAL EXAMINATION    DERMATOLOGY: Minimal PIPJ wound is noted. The area measures 3mm x 3mm w/ depth. Tendon is exposed. No infection is noted.     ORTHOPEDIC: No edema is noted. Rectus 2nd and 3rd toes are noted.     Assessment:     1. Postop check    2. Hammer toes of both feet    3. Pain in toes of both feet    4. Tobacco abuse    5. Morbid obesity    6. Antibiotic-induced yeast infection    7. Delayed surgical wound healing, initial encounter            Plan:     Postop check    Hammer toes of both feet    Pain in toes of both feet  -     oxyCODONE-acetaminophen (PERCOCET)  mg per tablet; Take 1 tablet by mouth every 6 (six) hours as needed for Pain.  Dispense: 28 tablet; Refill: 0    Tobacco abuse    Morbid obesity    Antibiotic-induced yeast infection  -     fluconazole (DIFLUCAN) 150 MG Tab; Take 1 tablet (150 mg total) by mouth every 72 hours. for 2 doses  Dispense: 2 tablet; Refill: 0    Delayed surgical wound healing, initial encounter  -     doxycycline (VIBRAMYCIN) 100 MG Cap; Take 1 capsule (100 mg total) by mouth every 12 (twelve) hours. for 7 days  Dispense: 14 capsule; Refill: 0  -     Aerobic culture (Specify Source)      Thorough discussion is had with the patient today, concerning the diagnosis, its etiology, and the treatment algorithm at present.     RICE therapy.    WB with surgical shoe on the LLE.    NSAIDs as needed with opioid.    Patient is counseled and  reminded concerning the importance of good nutrition and healthy eating habits, which may include blood sugar control, to prevent and/or help podiatric foot and ankle complications.    Did discuss in detail the harmful effects of nicotine/tobacco/cigarette/ marijuana smoking, especially in relation to the lower extremity. I do rec. consultation with primary care provider for further discussed of smoking cessation methods. Smoking & Tobacco use cessation couseling was rendered at today's visit; intermediate, bewteen 3 and 10 minutes.    The wound was surgically debrided after adequate prep with alcohol and/or betadine paint. Excisional wound debridement was performed using sharp #10/#15 blade/rounded scalpel and tissue nipper, with removal of all non-viable skin and soft tissues; necrotic skin/tissue formation. The woundbase/wound bed was also debrided to encourage bleeding as to promote/stimulate healing. Debridement was excisional and included epidermal, dermal and subcutaneous tissues. Post debridement measurements are as above. Hemostasis was achieved. Patient tolerated procedure well and without complications. Local woundcare with Hydrogel dressings and bandage thereafter.     Continue Hydrogel QD with bandage.              No future appointments.

## 2022-12-22 ENCOUNTER — PATIENT MESSAGE (OUTPATIENT)
Dept: PODIATRY | Facility: CLINIC | Age: 52
End: 2022-12-22
Payer: MEDICAID

## 2022-12-24 LAB — BACTERIA SPEC AEROBE CULT: NO GROWTH

## 2023-01-12 ENCOUNTER — PATIENT MESSAGE (OUTPATIENT)
Dept: PODIATRY | Facility: CLINIC | Age: 53
End: 2023-01-12
Payer: MEDICAID

## 2023-01-12 DIAGNOSIS — M20.41 HAMMER TOES OF BOTH FEET: ICD-10-CM

## 2023-01-12 DIAGNOSIS — M20.42 HAMMER TOES OF BOTH FEET: ICD-10-CM

## 2023-01-12 DIAGNOSIS — Z09 POSTOP CHECK: Primary | ICD-10-CM

## 2023-01-13 ENCOUNTER — PATIENT MESSAGE (OUTPATIENT)
Dept: PODIATRY | Facility: CLINIC | Age: 53
End: 2023-01-13
Payer: MEDICAID

## 2023-01-13 ENCOUNTER — APPOINTMENT (OUTPATIENT)
Dept: RADIOLOGY | Facility: HOSPITAL | Age: 53
End: 2023-01-13
Attending: PODIATRIST
Payer: MEDICAID

## 2023-01-13 DIAGNOSIS — M20.42 HAMMER TOES OF BOTH FEET: ICD-10-CM

## 2023-01-13 DIAGNOSIS — M79.674 PAIN IN TOES OF BOTH FEET: ICD-10-CM

## 2023-01-13 DIAGNOSIS — M79.675 PAIN IN TOES OF BOTH FEET: ICD-10-CM

## 2023-01-13 DIAGNOSIS — Z09 POSTOP CHECK: ICD-10-CM

## 2023-01-13 DIAGNOSIS — M20.41 HAMMER TOES OF BOTH FEET: ICD-10-CM

## 2023-01-13 PROCEDURE — 73630 X-RAY EXAM OF FOOT: CPT | Mod: 26,RT,, | Performed by: RADIOLOGY

## 2023-01-13 PROCEDURE — 73630 X-RAY EXAM OF FOOT: CPT | Mod: TC,50,PO

## 2023-01-13 PROCEDURE — 73630 X-RAY EXAM OF FOOT: CPT | Mod: 26,LT,, | Performed by: RADIOLOGY

## 2023-01-13 PROCEDURE — 73630 PR  X-RAY FOOT 3+ VW: ICD-10-PCS | Mod: 26,LT,, | Performed by: RADIOLOGY

## 2023-01-13 RX ORDER — IBUPROFEN 800 MG/1
800 TABLET ORAL 2 TIMES DAILY
Qty: 60 TABLET | Refills: 1 | Status: SHIPPED | OUTPATIENT
Start: 2023-01-13 | End: 2023-02-22 | Stop reason: SDUPTHER

## 2023-01-13 RX ORDER — GABAPENTIN 300 MG/1
300 CAPSULE ORAL 3 TIMES DAILY
Qty: 90 CAPSULE | Refills: 1 | Status: SHIPPED | OUTPATIENT
Start: 2023-01-13 | End: 2023-02-22 | Stop reason: SDUPTHER

## 2023-01-24 ENCOUNTER — OFFICE VISIT (OUTPATIENT)
Dept: PODIATRY | Facility: CLINIC | Age: 53
End: 2023-01-24
Payer: MEDICAID

## 2023-01-24 VITALS — HEIGHT: 66 IN | BODY MASS INDEX: 43.4 KG/M2 | WEIGHT: 270.06 LBS

## 2023-01-24 DIAGNOSIS — Z72.0 TOBACCO ABUSE: ICD-10-CM

## 2023-01-24 DIAGNOSIS — M20.41 HAMMER TOES OF BOTH FEET: ICD-10-CM

## 2023-01-24 DIAGNOSIS — E66.01 MORBID OBESITY: ICD-10-CM

## 2023-01-24 DIAGNOSIS — M79.674 PAIN IN TOES OF BOTH FEET: ICD-10-CM

## 2023-01-24 DIAGNOSIS — B37.9 ANTIBIOTIC-INDUCED YEAST INFECTION: ICD-10-CM

## 2023-01-24 DIAGNOSIS — Z09 POSTOP CHECK: Primary | ICD-10-CM

## 2023-01-24 DIAGNOSIS — M79.675 PAIN IN TOES OF BOTH FEET: ICD-10-CM

## 2023-01-24 DIAGNOSIS — T36.95XA ANTIBIOTIC-INDUCED YEAST INFECTION: ICD-10-CM

## 2023-01-24 DIAGNOSIS — M20.42 HAMMER TOES OF BOTH FEET: ICD-10-CM

## 2023-01-24 DIAGNOSIS — T81.89XA DELAYED SURGICAL WOUND HEALING, INITIAL ENCOUNTER: ICD-10-CM

## 2023-01-24 PROCEDURE — 3008F PR BODY MASS INDEX (BMI) DOCUMENTED: ICD-10-PCS | Mod: CPTII,,, | Performed by: PODIATRIST

## 2023-01-24 PROCEDURE — 3008F BODY MASS INDEX DOCD: CPT | Mod: CPTII,,, | Performed by: PODIATRIST

## 2023-01-24 PROCEDURE — 99024 POSTOP FOLLOW-UP VISIT: CPT | Mod: ,,, | Performed by: PODIATRIST

## 2023-01-24 PROCEDURE — 99024 PR POST-OP FOLLOW-UP VISIT: ICD-10-PCS | Mod: ,,, | Performed by: PODIATRIST

## 2023-01-24 PROCEDURE — 99999 PR PBB SHADOW E&M-EST. PATIENT-LVL III: ICD-10-PCS | Mod: PBBFAC,,, | Performed by: PODIATRIST

## 2023-01-24 PROCEDURE — 1159F PR MEDICATION LIST DOCUMENTED IN MEDICAL RECORD: ICD-10-PCS | Mod: CPTII,,, | Performed by: PODIATRIST

## 2023-01-24 PROCEDURE — 99999 PR PBB SHADOW E&M-EST. PATIENT-LVL III: CPT | Mod: PBBFAC,,, | Performed by: PODIATRIST

## 2023-01-24 PROCEDURE — 1159F MED LIST DOCD IN RCRD: CPT | Mod: CPTII,,, | Performed by: PODIATRIST

## 2023-01-24 PROCEDURE — 99213 OFFICE O/P EST LOW 20 MIN: CPT | Mod: PBBFAC | Performed by: PODIATRIST

## 2023-01-24 RX ORDER — FLUCONAZOLE 150 MG/1
150 TABLET ORAL
Qty: 2 TABLET | Refills: 0 | Status: SHIPPED | OUTPATIENT
Start: 2023-01-24 | End: 2023-01-30

## 2023-01-24 RX ORDER — OXYCODONE AND ACETAMINOPHEN 5; 325 MG/1; MG/1
1 TABLET ORAL EVERY 6 HOURS PRN
Qty: 28 TABLET | Refills: 0 | Status: SHIPPED | OUTPATIENT
Start: 2023-01-24 | End: 2023-01-31

## 2023-01-24 RX ORDER — DOXYCYCLINE 100 MG/1
100 CAPSULE ORAL EVERY 12 HOURS
Qty: 14 CAPSULE | Refills: 0 | Status: SHIPPED | OUTPATIENT
Start: 2023-01-24 | End: 2023-01-31

## 2023-01-24 NOTE — PROGRESS NOTES
Subjective:       Patient ID: Laura Vora is a 52 y.o. female.    Chief Complaint: Follow-up (Left ft 3rd toe 9/10 Pain Non Diabetic PCP: Dr. Benites)    HPI:  Laura Vora presents to the office today, s/p 10/24/22 B/L 2nd and 3rd hammer toe correction. WB with sneakers. States moderate LLE 3rd toe pains. Has been using Hydrogel to the LLE 3rd toe. States no infection.      No results found for: HGBA1C    Review of patient's allergies indicates:   Allergen Reactions    Iodine and iodide containing products Hives    Bactrim [sulfamethoxazole-trimethoprim] Hives    Losartan potassium Other (See Comments)       Past Medical History:   Diagnosis Date    Asthma     Bronchitis     Diverticulitis     HTN (hypertension)     Obese     Tobacco use        Family History   Problem Relation Age of Onset    Hypertension Unknown     Diabetes Mother     Hypertension Mother     Heart disease Mother     Cancer Sister     Breast cancer Sister     Cancer Paternal Aunt     Breast cancer Maternal Aunt        Social History     Socioeconomic History    Marital status: Single   Tobacco Use    Smoking status: Every Day     Packs/day: 0.25     Types: Cigarettes    Smokeless tobacco: Never   Substance and Sexual Activity    Alcohol use: Yes     Comment: occasionally    Drug use: No       Past Surgical History:   Procedure Laterality Date    APPENDECTOMY      CHOLECYSTECTOMY      HYSTERECTOMY      OSTEOTOMY OF METATARSAL BONE Bilateral 10/24/2022    Procedure: OSTEOTOMY, METATARSAL BONE;  Surgeon: Derrick Patel DPM;  Location: [facility];  Service: Podiatry;  Laterality: Bilateral;       Review of Systems   Constitutional:  Negative for chills, fatigue and fever.   HENT:  Negative for hearing loss.    Eyes:  Negative for photophobia and visual disturbance.   Respiratory:  Negative for cough, chest tightness, shortness of breath and wheezing.    Cardiovascular:  Negative for chest pain and palpitations.   Gastrointestinal:  Negative  "for constipation, diarrhea, nausea and vomiting.   Endocrine: Negative for cold intolerance and heat intolerance.   Genitourinary:  Negative for flank pain.   Musculoskeletal:  Negative for neck pain and neck stiffness.   Skin:  Positive for wound.   Neurological:  Negative for light-headedness and headaches.   Psychiatric/Behavioral:  Negative for sleep disturbance.         Objective:   Ht 5' 6" (1.676 m)   Wt 122.5 kg (270 lb 1 oz)   LMP 09/05/2014 (Exact Date)   BMI 43.59 kg/m²             LOWER EXTREMITY PHYSICAL EXAMINATION    DERMATOLOGY: The wound at the LLE 3rd toe is nearly resolved. No tendon is noted. No drainage. No malodor is noted. No drainage. No osseous exposure. Upon debridement of scab, a small wound is noted, with minimal Vicryl suture exposure.    ORTHOPEDIC: No edema is noted. Rectus 2nd and 3rd toes are noted.     Assessment:     1. Postop check    2. Hammer toes of both feet    3. Pain in toes of both feet    4. Tobacco abuse    5. Morbid obesity    6. Delayed surgical wound healing, initial encounter    7. Antibiotic-induced yeast infection        Plan:     Postop check  -     oxyCODONE-acetaminophen (PERCOCET) 5-325 mg per tablet; Take 1 tablet by mouth every 6 (six) hours as needed for Pain.  Dispense: 28 tablet; Refill: 0    Hammer toes of both feet    Pain in toes of both feet  -     oxyCODONE-acetaminophen (PERCOCET) 5-325 mg per tablet; Take 1 tablet by mouth every 6 (six) hours as needed for Pain.  Dispense: 28 tablet; Refill: 0    Tobacco abuse    Morbid obesity    Delayed surgical wound healing, initial encounter  -     doxycycline (VIBRAMYCIN) 100 MG Cap; Take 1 capsule (100 mg total) by mouth every 12 (twelve) hours. for 7 days  Dispense: 14 capsule; Refill: 0    Antibiotic-induced yeast infection  -     fluconazole (DIFLUCAN) 150 MG Tab; Take 1 tablet (150 mg total) by mouth every 72 hours. for 2 doses  Dispense: 2 tablet; Refill: 0        Thorough discussion is had with the " patient today, concerning the diagnosis, its etiology, and the treatment algorithm at present.     Local infiltrative block proximal to the area of pathology with 1% Lidocaine + Epinephrine; for anesthesia and hemostasis.    The 3rd toe incision a made within the prior incision. The deep Vicryl on the suture is noted and is removed. Copious lavage is noted. The skin is closed with 4-0 Nylon.    Local wound care with DSD and ACE Wrap.    Sx. Shoe.            Future Appointments   Date Time Provider Department Center   2/6/2023 10:45 AM Derrick Patel DPM [facility]

## 2023-02-07 ENCOUNTER — OFFICE VISIT (OUTPATIENT)
Dept: PODIATRY | Facility: CLINIC | Age: 53
End: 2023-02-07
Payer: MEDICAID

## 2023-02-07 VITALS — HEIGHT: 66 IN | WEIGHT: 270 LBS | BODY MASS INDEX: 43.39 KG/M2

## 2023-02-07 DIAGNOSIS — Z98.890 HISTORY OF TOE SURGERY: Primary | ICD-10-CM

## 2023-02-07 PROCEDURE — 99999 PR PBB SHADOW E&M-EST. PATIENT-LVL III: CPT | Mod: PBBFAC,,, | Performed by: PODIATRIST

## 2023-02-07 PROCEDURE — 1159F MED LIST DOCD IN RCRD: CPT | Mod: CPTII,,, | Performed by: PODIATRIST

## 2023-02-07 PROCEDURE — 99024 POSTOP FOLLOW-UP VISIT: CPT | Mod: ,,, | Performed by: PODIATRIST

## 2023-02-07 PROCEDURE — 3008F PR BODY MASS INDEX (BMI) DOCUMENTED: ICD-10-PCS | Mod: CPTII,,, | Performed by: PODIATRIST

## 2023-02-07 PROCEDURE — 3008F BODY MASS INDEX DOCD: CPT | Mod: CPTII,,, | Performed by: PODIATRIST

## 2023-02-07 PROCEDURE — 99024 PR POST-OP FOLLOW-UP VISIT: ICD-10-PCS | Mod: ,,, | Performed by: PODIATRIST

## 2023-02-07 PROCEDURE — 1160F RVW MEDS BY RX/DR IN RCRD: CPT | Mod: CPTII,,, | Performed by: PODIATRIST

## 2023-02-07 PROCEDURE — 1160F PR REVIEW ALL MEDS BY PRESCRIBER/CLIN PHARMACIST DOCUMENTED: ICD-10-PCS | Mod: CPTII,,, | Performed by: PODIATRIST

## 2023-02-07 PROCEDURE — 99999 PR PBB SHADOW E&M-EST. PATIENT-LVL III: ICD-10-PCS | Mod: PBBFAC,,, | Performed by: PODIATRIST

## 2023-02-07 PROCEDURE — 1159F PR MEDICATION LIST DOCUMENTED IN MEDICAL RECORD: ICD-10-PCS | Mod: CPTII,,, | Performed by: PODIATRIST

## 2023-02-07 PROCEDURE — 99213 OFFICE O/P EST LOW 20 MIN: CPT | Mod: PBBFAC | Performed by: PODIATRIST

## 2023-02-07 NOTE — PROGRESS NOTES
Subjective:       Patient ID: Larua Vora is a 52 y.o. female.    Chief Complaint: Post-op Evaluation (S/p, suture removal, 0 pain, non diabetic wears tennis shoes and socks )    HPI:  Laura Vora presents to the office today, s/p 1/24/2023 2ndary closure of the LLE 3rd toe (s/p 10/24/22 B/L 2nd and 3rd hammer toe correction). WB with Sx shoe. States minimal to no pain to the LLE 3rd toe.    No results found for: HGBA1C    Review of patient's allergies indicates:   Allergen Reactions    Iodine and iodide containing products Hives    Bactrim [sulfamethoxazole-trimethoprim] Hives    Losartan potassium Other (See Comments)       Past Medical History:   Diagnosis Date    Asthma     Bronchitis     Diverticulitis     HTN (hypertension)     Obese     Tobacco use        Family History   Problem Relation Age of Onset    Hypertension Unknown     Diabetes Mother     Hypertension Mother     Heart disease Mother     Cancer Sister     Breast cancer Sister     Cancer Paternal Aunt     Breast cancer Maternal Aunt        Social History     Socioeconomic History    Marital status: Single   Tobacco Use    Smoking status: Every Day     Packs/day: 0.25     Types: Cigarettes    Smokeless tobacco: Never   Substance and Sexual Activity    Alcohol use: Yes     Comment: occasionally    Drug use: No       Past Surgical History:   Procedure Laterality Date    APPENDECTOMY      CHOLECYSTECTOMY      HYSTERECTOMY      OSTEOTOMY OF METATARSAL BONE Bilateral 10/24/2022    Procedure: OSTEOTOMY, METATARSAL BONE;  Surgeon: Derrick Patel DPM;  Location: [facility];  Service: Podiatry;  Laterality: Bilateral;       Review of Systems   Constitutional:  Negative for chills, fatigue and fever.   HENT:  Negative for hearing loss.    Eyes:  Negative for photophobia and visual disturbance.   Respiratory:  Negative for cough, chest tightness, shortness of breath and wheezing.    Cardiovascular:  Negative for chest pain and palpitations.  "  Gastrointestinal:  Negative for constipation, diarrhea, nausea and vomiting.   Endocrine: Negative for cold intolerance and heat intolerance.   Genitourinary:  Negative for flank pain.   Musculoskeletal:  Negative for neck pain and neck stiffness.   Skin:  Negative for wound.   Neurological:  Negative for light-headedness and headaches.   Psychiatric/Behavioral:  Negative for sleep disturbance.         Objective:   Ht 5' 6" (1.676 m)   Wt 122.5 kg (270 lb)   LMP 09/05/2014 (Exact Date)   BMI 43.58 kg/m²     LOWER EXTREMITY PHYSICAL EXAMINATION    DERMATOLOGY: The wound at the LLE 3rd toe is healed and resolved.     ORTHOPEDIC: No edema is noted. Rectus 2nd and 3rd toes are noted.     Assessment:     1. History of toe surgery          Plan:     History of toe surgery          Thorough discussion is had with the patient today, concerning the diagnosis, its etiology, and the treatment algorithm at present.     Sutures are removed without ailment or pathology.    Transition to normal shoe gear.              No future appointments.                "

## 2023-05-19 ENCOUNTER — OFFICE VISIT (OUTPATIENT)
Dept: PSYCHIATRY | Facility: CLINIC | Age: 53
End: 2023-05-19
Payer: MEDICAID

## 2023-05-19 ENCOUNTER — PATIENT MESSAGE (OUTPATIENT)
Dept: PODIATRY | Facility: CLINIC | Age: 53
End: 2023-05-19
Payer: MEDICAID

## 2023-05-19 DIAGNOSIS — F90.0 ATTENTION DEFICIT HYPERACTIVITY DISORDER (ADHD), PREDOMINANTLY INATTENTIVE TYPE: Primary | ICD-10-CM

## 2023-05-19 DIAGNOSIS — G47.00 INSOMNIA, UNSPECIFIED TYPE: ICD-10-CM

## 2023-05-19 DIAGNOSIS — F41.1 GENERALIZED ANXIETY DISORDER: ICD-10-CM

## 2023-05-19 PROCEDURE — 1159F MED LIST DOCD IN RCRD: CPT | Mod: CPTII,95,, | Performed by: PSYCHOLOGIST

## 2023-05-19 PROCEDURE — 1159F PR MEDICATION LIST DOCUMENTED IN MEDICAL RECORD: ICD-10-PCS | Mod: CPTII,95,, | Performed by: PSYCHOLOGIST

## 2023-05-19 PROCEDURE — 99215 PR OFFICE/OUTPT VISIT, EST, LEVL V, 40-54 MIN: ICD-10-PCS | Mod: HP,HB,95, | Performed by: PSYCHOLOGIST

## 2023-05-19 PROCEDURE — 99215 OFFICE O/P EST HI 40 MIN: CPT | Mod: HP,HB,95, | Performed by: PSYCHOLOGIST

## 2023-05-19 RX ORDER — DEXTROAMPHETAMINE SACCHARATE, AMPHETAMINE ASPARTATE, DEXTROAMPHETAMINE SULFATE AND AMPHETAMINE SULFATE 3.75; 3.75; 3.75; 3.75 MG/1; MG/1; MG/1; MG/1
15 TABLET ORAL
COMMUNITY
Start: 2023-05-05 | End: 2023-05-19 | Stop reason: SDUPTHER

## 2023-05-19 RX ORDER — LORATADINE 10 MG/1
10 TABLET ORAL
COMMUNITY
Start: 2023-03-21

## 2023-05-19 RX ORDER — ALPRAZOLAM 1 MG/1
TABLET ORAL
Qty: 30 TABLET | Refills: 0 | Status: SHIPPED | OUTPATIENT
Start: 2023-05-19 | End: 2023-07-14

## 2023-05-19 RX ORDER — SEMAGLUTIDE 1.34 MG/ML
INJECTION, SOLUTION SUBCUTANEOUS
COMMUNITY
Start: 2023-04-19 | End: 2023-08-30

## 2023-05-19 RX ORDER — TRAZODONE HYDROCHLORIDE 100 MG/1
100 TABLET ORAL NIGHTLY
Qty: 30 TABLET | Refills: 2 | Status: SHIPPED | OUTPATIENT
Start: 2023-05-19 | End: 2023-07-14

## 2023-05-19 RX ORDER — METHOCARBAMOL 500 MG/1
500 TABLET, FILM COATED ORAL 2 TIMES DAILY PRN
COMMUNITY
Start: 2023-04-10 | End: 2023-08-30

## 2023-05-19 RX ORDER — DEXTROAMPHETAMINE SULFATE, DEXTROAMPHETAMINE SACCHARATE, AMPHETAMINE SULFATE AND AMPHETAMINE ASPARTATE 7.5; 7.5; 7.5; 7.5 MG/1; MG/1; MG/1; MG/1
CAPSULE, EXTENDED RELEASE ORAL EVERY MORNING
COMMUNITY
Start: 2023-05-05 | End: 2023-05-19

## 2023-05-19 RX ORDER — DEXTROAMPHETAMINE SACCHARATE, AMPHETAMINE ASPARTATE MONOHYDRATE, DEXTROAMPHETAMINE SULFATE AND AMPHETAMINE SULFATE 7.5; 7.5; 7.5; 7.5 MG/1; MG/1; MG/1; MG/1
30 CAPSULE, EXTENDED RELEASE ORAL EVERY MORNING
Qty: 30 CAPSULE | Refills: 0 | Status: SHIPPED | OUTPATIENT
Start: 2023-07-18 | End: 2023-08-17

## 2023-05-19 RX ORDER — ALPRAZOLAM 1 MG/1
1 TABLET ORAL DAILY PRN
COMMUNITY
Start: 2023-05-09 | End: 2023-05-19 | Stop reason: SDUPTHER

## 2023-05-19 RX ORDER — DEXTROAMPHETAMINE SACCHARATE, AMPHETAMINE ASPARTATE MONOHYDRATE, DEXTROAMPHETAMINE SULFATE AND AMPHETAMINE SULFATE 7.5; 7.5; 7.5; 7.5 MG/1; MG/1; MG/1; MG/1
30 CAPSULE, EXTENDED RELEASE ORAL EVERY MORNING
Qty: 30 CAPSULE | Refills: 0 | Status: SHIPPED | OUTPATIENT
Start: 2023-05-19 | End: 2023-06-18

## 2023-05-19 RX ORDER — INDOMETHACIN 50 MG/1
50 CAPSULE ORAL
COMMUNITY
Start: 2023-03-17 | End: 2023-08-30

## 2023-05-19 RX ORDER — OXYBUTYNIN CHLORIDE 10 MG/1
10 TABLET, EXTENDED RELEASE ORAL
COMMUNITY
Start: 2023-02-28

## 2023-05-19 RX ORDER — AMLODIPINE BESYLATE 10 MG/1
10 TABLET ORAL NIGHTLY
COMMUNITY
Start: 2023-05-12

## 2023-05-19 RX ORDER — IBUPROFEN 800 MG/1
800 TABLET ORAL 2 TIMES DAILY PRN
COMMUNITY
Start: 2023-03-24

## 2023-05-19 RX ORDER — DEXTROAMPHETAMINE SACCHARATE, AMPHETAMINE ASPARTATE MONOHYDRATE, DEXTROAMPHETAMINE SULFATE AND AMPHETAMINE SULFATE 7.5; 7.5; 7.5; 7.5 MG/1; MG/1; MG/1; MG/1
30 CAPSULE, EXTENDED RELEASE ORAL EVERY MORNING
Qty: 30 CAPSULE | Refills: 0 | Status: SHIPPED | OUTPATIENT
Start: 2023-06-18 | End: 2023-07-18

## 2023-05-19 NOTE — PROGRESS NOTES
Outpatient Psychiatry Follow-Up Visit    2023    Timeframe: Corona Virus Outbreak     The patient location is: Patient's car/ Patient reported that his/her location at the time of this visit was in the [facility]     Visit type: Virtual visit with synchronous audio and video     Each patient to whom he or she provides medical services by telemedicine is: (1) informed of the relationship between the physician and patient and the respective role of any other health care provider with respect to management of the patient; and (2) notified that he or she may decline to receive medical services by telemedicine and may withdraw from such care at any time.    I also informed patient of the following:   Anitha Peña, PhD, MPAP:  [place] medical license number: [id number]    My contact info:  [facility] / 2nd Floor  [address] [place] [address]   Ph: [phone]    If technology issues, call office phone: Ph: [phone]  If crisis: Dial 911 or go to nearest Emergency Room (ER)  If questions related to privacy practices: contact [facility] Information Department: [phone]    Chief Complaint:  Laura Vora is a 52 y.o. female who presents today for follow-up of anxiety and inattention/distractibility .       Impressions/Plan from last visit: Laura attended her virtual visit. 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 She said that around 1 pm, she notices that her Adderall is wearing off. She reported that she has been on Adderall XR about 6 months. She had tried Lexapro before the diagnosis of ADHD and when that was not working, Dr. Ugalde reportedly dx with ADHD and started stimulant therapy. Laura has a lot of stressors--recently had a loss in her family and the  this past weekend. She also works a second job--she does [employer], and [employer]--sometimes does [employer] and [employer]. She is considering going to nursing school for LPN;  "plans to look further into it after she recovers from her knee procedure. She reported that she and Dr. Ugalde had talked about adding an immediate release Adderall 15 mg to her afternoons. She is also taking Cymbalta, Seroquel, and Xanax prn. We agreed to continue the medicines that she has been taking with Dr. Ugalde and add the immediate release for her afternoons. Medicines--Cymbalta 60 mg, Seroquel 50 mg hs (sometimes only takes 25 mg), Xanax 0.5 mg prn; Adderall XR 30 mg; Adderall IR 15 mg afternoons.     Dr. Vane Molina is her PCP.     Prior medicines--Lexapro (did not help), Ambien, Trazodone, Xanax ("Xanabar").      since June 2021.    Interval History and Content of Current Session: Laura attended her virtual visit. She was last seen by me in December of 2021. She said that she had not been working and is working now and trying to learn a new system. She said that she was working at [email or web address] and providers there were prescribing for her. She said that her Xanax had been increased. She said that about twice a month, she wakes up from her sleep from a panic attack--the 1 mg helps her. She had seen a provider at [facility], but they reportedly stopped taking her ins. She is working at [employer] in [place]. She has to be at work for 7 am--usually takes the IR around noon, unless she drinks coffee that morning. We considered changing to Myday[employer] but agreed to continue as currently prescribed for now. Her PCP is now with [facility] in [place]. She also reported trouble sleeping sometimes--liked the trazodone.    Plan--continue Adderall XR 30 mg (sent 3 scripts); Xanax 1 mg #30 per 90 days; trazodone 100 mg hs     since January 2023.        Prior medicines: Lexapro (did not help), Ambien, Trazodone, Xanax ("Xanabar"), Cymbalta, Seroquel    GAD7 5/17/2023 5/30/2022 2/2/2022   1. Feeling nervous, anxious, or on edge? 0 3 3   2. Not being able to stop or control worrying? 2 2 3   3. Worrying too much about different " things? 2 3 3   4. Trouble relaxing? 3 3 3   5. Being so restless that it is hard to sit still? 3 3 3   6. Becoming easily annoyed or irritable? 3 3 3   7. Feeling afraid as if something awful might happen? 0 2 3   CAM-7 Score 13 19 21     0-4 = Minimal anxiety  5-9 = Mild anxiety  10-14 = Moderate anxiety  15-21 = Severe anxiety     Depression Patient Health Questionnaire 12/9/2021   Over the last two weeks how often have you been bothered by little interest or pleasure in doing things Not at all   Over the last two weeks how often have you been bothered by feeling down, depressed or hopeless Not at all   PHQ-2 Total Score 0      0-4 = No intervention  5 to 9 = Mild  10 to 14 = Moderate  15 to 19 = Moderately severe  =20 = Severe    Review of Systems   PSYCHIATRIC: Pertinant items are noted in the narrative.    Past Medical, Family and Social History: The patient's past medical, family and social history have been reviewed and updated as appropriate within the electronic medical record - see encounter notes.    Social History     Socioeconomic History    Marital status: Single   Tobacco Use    Smoking status: Every Day     Packs/day: 0.25     Types: Cigarettes    Smokeless tobacco: Never   Substance and Sexual Activity    Alcohol use: Yes     Comment: occasionally    Drug use: No         Current Outpatient Medications:     albuterol (PROVENTIL/VENTOLIN HFA) 90 mcg/actuation inhaler, Inhale 2 puffs into the lungs every 4 (four) hours as needed for Wheezing., Disp: 12 g, Rfl: 0    albuterol-ipratropium (DUO-NEB) 2.5 mg-0.5 mg/3 mL nebulizer solution, 3 ml as needed, Disp: , Rfl:     ALPRAZolam (XANAX) 1 MG tablet, Take 1 tab daily by mouth as needed for anxiety, not to exceed 30 tabs per 90 days, Disp: 30 tablet, Rfl: 0    amLODIPine (NORVASC) 10 MG tablet, Take 10 mg by mouth every evening., Disp: , Rfl:     azelastine (ASTELIN) 137 mcg (0.1 %) nasal spray, 1 spray (137 mcg total) by Nasal route 2 (two) times  daily., Disp: 30 mL, Rfl: 5    cetirizine (ZYRTEC) 10 MG tablet, Take 1 tablet (10 mg total) by mouth once daily., Disp: 30 tablet, Rfl: 1    dextroamphetamine-amphetamine (ADDERALL XR) 30 MG 24 hr capsule, Take 1 capsule (30 mg total) by mouth every morning., Disp: 30 capsule, Rfl: 0    [START ON 2023] dextroamphetamine-amphetamine (ADDERALL XR) 30 MG 24 hr capsule, Take 1 capsule (30 mg total) by mouth every morning., Disp: 30 capsule, Rfl: 0    [START ON 2023] dextroamphetamine-amphetamine (ADDERALL XR) 30 MG 24 hr capsule, Take 1 capsule (30 mg total) by mouth every morning., Disp: 30 capsule, Rfl: 0    EPINEPHrine (EPIPEN) 0.3 mg/0.3 mL AtIn, Inject 0.3 mLs (0.3 mg total) into the muscle once. for 1 dose, Disp: 2 each, Rfl: 3    fluticasone propionate (FLONASE) 50 mcg/actuation nasal spray, SPRAY 1 SPRAY INTO EACH NOSTRIL TWICE A DAY, Disp: , Rfl:     gabapentin (NEURONTIN) 300 MG capsule, Take 1 capsule (300 mg total) by mouth 3 (three) times daily., Disp: 90 capsule, Rfl: 1    ibuprofen (ADVIL,MOTRIN) 800 MG tablet, Take 800 mg by mouth 2 (two) times daily as needed., Disp: , Rfl:     indomethacin (INDOCIN) 50 MG capsule, Take 50 mg by mouth as needed., Disp: , Rfl:     levocetirizine (XYZAL) 5 MG tablet, Take 1 tablet (5 mg total) by mouth every evening., Disp: 30 tablet, Rfl: 11    loratadine (CLARITIN) 10 mg tablet, Take 10 mg by mouth., Disp: , Rfl:     methocarbamoL (ROBAXIN) 500 MG Tab, Take 500 mg by mouth 2 (two) times daily as needed., Disp: , Rfl:     montelukast (SINGULAIR) 10 mg tablet, Take 1 tablet (10 mg total) by mouth once daily., Disp: 30 tablet, Rfl: 5    nicotine (NICODERM CQ) 21 mg/24 hr, 1 patch to skin, Disp: , Rfl:     omeprazole (PRILOSEC) 40 MG capsule, Take 1 capsule (40 mg total) by mouth once daily., Disp: 30 capsule, Rfl: 5    oxybutynin (DITROPAN-XL) 10 MG 24 hr tablet, Take 10 mg by mouth., Disp: , Rfl:     OZEMPIC 1 mg/dose (4 mg/3 mL), SMARTSI.75 Milliliter(s)  SUB-Q Once a Week, Disp: , Rfl:     traZODone (DESYREL) 100 MG tablet, Take 1 tablet (100 mg total) by mouth every evening., Disp: 30 tablet, Rfl: 2    Compliance: partial    Side effects: see above    Risk Parameters:  Patient reports no suicidal ideation  Patient reports no homicidal ideation  Patient reports no self-injurious behavior  Patient reports no violent behavior    Exam (detailed: at least 9 elements; comprehensive: all 15 elements)   Constitutional  Vitals:  Most recent vital signs were reviewed.   Last 3 sets of Vitals    Vitals - 1 value per visit 1/24/2023 2/7/2023 2/7/2023   SYSTOLIC - - -   DIASTOLIC - - -   Pulse - - -   Temp - - -   Resp - - -   SPO2 - - -   Weight (lb) 270.06 - 270   Weight (kg) 122.5 - 122.471   Height 66 - 66   BMI (Calculated) 43.6 - 43.6   VISIT REPORT - - -   Pain Score  - 0 -   Some recent data might be hidden          General:  age appropriate, casually dressed, neatly groomed, hair blonde highlights--pulled back with bangs hanging     Musculoskeletal  Muscle Strength/Tone:  no tremor, no tic   Gait & Station:  video visit     Psychiatric  Speech:  no latency; no press   Behavior: wnl   Mood & Affect:  euthymic  congruent and appropriate   Thought Process:  normal and logical   Associations:  intact   Thought Content:  normal, no suicidality, no homicidality, delusions, or paranoia   Insight:  intact   Judgement: behavior is adequate to circumstances   Orientation:  grossly intact   Memory: intact for content of interview   Language: grossly intact   Attention Span & Concentration:  Grossly intact   Fund of Knowledge:  intact and appropriate to age and level of education     Assessment and Diagnosis   Status/Progress: Based on the examination today, the patient's problem(s) is/are adequately but not ideally controlled.  New problems have not been presented today.   Co-morbidities and psychosocial stressors  are complicating management of the primary condition.  There are  no active rule-out diagnoses for this patient at this time.     General Impression:     Encounter Diagnoses   Name Primary?    Attention deficit hyperactivity disorder (ADHD), predominantly inattentive type Yes    Generalized anxiety disorder     Insomnia, unspecified type        Intervention/Counseling/Treatment Plan   Medication Management: Discussed risks, benefits, and alternatives to treatment plan documented above with patient. I answered all patient questions related to this plan, and patient expressed understanding and agreement.   continue Adderall XR 30 mg (sent 3 scripts); Xanax 1 mg #30 per 90 days; trazodone 100 mg hs  Consider counseling    Medication List with Changes/Refills   New Medications    DEXTROAMPHETAMINE-AMPHETAMINE (ADDERALL XR) 30 MG 24 HR CAPSULE    Take 1 capsule (30 mg total) by mouth every morning.    DEXTROAMPHETAMINE-AMPHETAMINE (ADDERALL XR) 30 MG 24 HR CAPSULE    Take 1 capsule (30 mg total) by mouth every morning.    DEXTROAMPHETAMINE-AMPHETAMINE (ADDERALL XR) 30 MG 24 HR CAPSULE    Take 1 capsule (30 mg total) by mouth every morning.    TRAZODONE (DESYREL) 100 MG TABLET    Take 1 tablet (100 mg total) by mouth every evening.   Current Medications    ALBUTEROL (PROVENTIL/VENTOLIN HFA) 90 MCG/ACTUATION INHALER    Inhale 2 puffs into the lungs every 4 (four) hours as needed for Wheezing.    ALBUTEROL-IPRATROPIUM (DUO-NEB) 2.5 MG-0.5 MG/3 ML NEBULIZER SOLUTION    3 ml as needed    AMLODIPINE (NORVASC) 10 MG TABLET    Take 10 mg by mouth every evening.    AZELASTINE (ASTELIN) 137 MCG (0.1 %) NASAL SPRAY    1 spray (137 mcg total) by Nasal route 2 (two) times daily.    CETIRIZINE (ZYRTEC) 10 MG TABLET    Take 1 tablet (10 mg total) by mouth once daily.    DEXTROAMPHETAMINE-AMPHETAMINE (ADDERALL) 15 MG TABLET    Take 1 tablet (15 mg total) by mouth once daily.    EPINEPHRINE (EPIPEN) 0.3 MG/0.3 ML ATIN    Inject 0.3 mLs (0.3 mg total) into the muscle once. for 1 dose    FLUTICASONE  PROPIONATE (FLONASE) 50 MCG/ACTUATION NASAL SPRAY    SPRAY 1 SPRAY INTO EACH NOSTRIL TWICE A DAY    GABAPENTIN (NEURONTIN) 300 MG CAPSULE    Take 1 capsule (300 mg total) by mouth 3 (three) times daily.    IBUPROFEN (ADVIL,MOTRIN) 800 MG TABLET    Take 800 mg by mouth 2 (two) times daily as needed.    INDOMETHACIN (INDOCIN) 50 MG CAPSULE    Take 50 mg by mouth as needed.    LEVOCETIRIZINE (XYZAL) 5 MG TABLET    Take 1 tablet (5 mg total) by mouth every evening.    LORATADINE (CLARITIN) 10 MG TABLET    Take 10 mg by mouth.    METHOCARBAMOL (ROBAXIN) 500 MG TAB    Take 500 mg by mouth 2 (two) times daily as needed.    MONTELUKAST (SINGULAIR) 10 MG TABLET    Take 1 tablet (10 mg total) by mouth once daily.    NICOTINE (NICODERM CQ) 21 MG/24 HR    1 patch to skin    OMEPRAZOLE (PRILOSEC) 40 MG CAPSULE    Take 1 capsule (40 mg total) by mouth once daily.    OXYBUTYNIN (DITROPAN-XL) 10 MG 24 HR TABLET    Take 10 mg by mouth.    OZEMPIC 1 MG/DOSE (4 MG/3 ML)    SMARTSI.75 Milliliter(s) SUB-Q Once a Week   Changed and/or Refilled Medications    Modified Medication Previous Medication    ALPRAZOLAM (XANAX) 1 MG TABLET ALPRAZolam (XANAX) 1 MG tablet       Take 1 tab daily by mouth as needed for anxiety, not to exceed 30 tabs per 90 days    Take 1 mg by mouth daily as needed.   Discontinued Medications    ADDERALL XR 30 MG 24 HR CAPSULE    Take by mouth every morning.    ALPRAZOLAM (XANAX) 0.5 MG TABLET    Take 1 tablet (0.5 mg total) by mouth as needed for Anxiety.    AMLODIPINE (NORVASC) 5 MG TABLET    Take 5 mg by mouth every evening.    DEXTROAMPHETAMINE-AMPHETAMINE (ADDERALL) 15 MG TABLET    Take 15 mg by mouth.    DULOXETINE (CYMBALTA) 60 MG CAPSULE    Take 1 capsule (60 mg total) by mouth once daily.    OXYBUTYNIN (DITROPAN-XL) 5 MG TR24    Take 5 mg by mouth once daily.        Return to Clinic: 3 months      Time spent with pt including note preparation: 30 minutes       Anitha Peña, PhD, MP  [facility]  Psychologist  [facility]  [address]  [place] [id number] [phone] ph  [phone] fax

## 2023-07-13 ENCOUNTER — PATIENT MESSAGE (OUTPATIENT)
Dept: PSYCHIATRY | Facility: CLINIC | Age: 53
End: 2023-07-13
Payer: MEDICAID

## 2023-07-14 DIAGNOSIS — F41.1 GENERALIZED ANXIETY DISORDER: ICD-10-CM

## 2023-07-14 RX ORDER — ALPRAZOLAM 1 MG/1
1 TABLET ORAL NIGHTLY
Qty: 30 TABLET | Refills: 1 | Status: SHIPPED | OUTPATIENT
Start: 2023-07-14 | End: 2023-08-30 | Stop reason: SDUPTHER

## 2023-07-17 ENCOUNTER — TELEPHONE (OUTPATIENT)
Dept: PSYCHIATRY | Facility: CLINIC | Age: 53
End: 2023-07-17
Payer: MEDICAID

## 2023-07-17 NOTE — TELEPHONE ENCOUNTER
Called [facility] in [place] to inquire if they can use a partial script for her ins    After holding for 18 mins, I disconnected    Called pt to tell her to have pharmacy fill partial for ins to cover it. Had to leave a voicemail message

## 2023-07-18 DIAGNOSIS — F41.1 GENERALIZED ANXIETY DISORDER: Primary | ICD-10-CM

## 2023-07-18 RX ORDER — ALPRAZOLAM 1 MG/1
1 TABLET ORAL NIGHTLY
Qty: 20 TABLET | Refills: 0 | Status: SHIPPED | OUTPATIENT
Start: 2023-07-18 | End: 2023-08-30

## 2023-07-19 ENCOUNTER — DOCUMENTATION ONLY (OUTPATIENT)
Dept: PSYCHIATRY | Facility: CLINIC | Age: 53
End: 2023-07-19
Payer: MEDICAID

## 2023-07-19 ENCOUNTER — TELEPHONE (OUTPATIENT)
Dept: PSYCHIATRY | Facility: CLINIC | Age: 53
End: 2023-07-19
Payer: MEDICAID

## 2023-07-19 NOTE — PROGRESS NOTES
Received fax from pharmacy, asking if they can fill Xanax #30 because pt gets tramadol from another provider    Reviewed our active meds and tramadol is not one--unless filled in the last week, has not been filled for a couple of months if not more    Asked nurse to contact pharmacy and notify of above--and to reach out to tramadol provider if needed

## 2023-08-10 ENCOUNTER — PATIENT MESSAGE (OUTPATIENT)
Dept: PSYCHIATRY | Facility: CLINIC | Age: 53
End: 2023-08-10

## 2023-08-15 ENCOUNTER — PATIENT MESSAGE (OUTPATIENT)
Dept: PSYCHIATRY | Facility: CLINIC | Age: 53
End: 2023-08-15
Payer: MEDICAID

## 2023-08-16 ENCOUNTER — TELEPHONE (OUTPATIENT)
Dept: PSYCHIATRY | Facility: CLINIC | Age: 53
End: 2023-08-16
Payer: MEDICAID

## 2023-08-16 ENCOUNTER — PATIENT MESSAGE (OUTPATIENT)
Dept: PSYCHIATRY | Facility: CLINIC | Age: 53
End: 2023-08-16
Payer: MEDICAID

## 2023-08-16 DIAGNOSIS — F90.0 ADHD, PREDOMINANTLY INATTENTIVE TYPE: ICD-10-CM

## 2023-08-16 RX ORDER — DEXTROAMPHETAMINE SACCHARATE, AMPHETAMINE ASPARTATE, DEXTROAMPHETAMINE SULFATE AND AMPHETAMINE SULFATE 3.75; 3.75; 3.75; 3.75 MG/1; MG/1; MG/1; MG/1
15 TABLET ORAL DAILY
Qty: 30 TABLET | Refills: 0 | Status: SHIPPED | OUTPATIENT
Start: 2023-08-16 | End: 2023-08-30 | Stop reason: SDUPTHER

## 2023-08-16 NOTE — TELEPHONE ENCOUNTER
Called Laura to discuss her stimulants--she needs Adderall XR 30 mg am and Adderall IR 15 afternoon    Will send to her pharmacy

## 2023-08-30 ENCOUNTER — OFFICE VISIT (OUTPATIENT)
Dept: PSYCHIATRY | Facility: CLINIC | Age: 53
End: 2023-08-30
Payer: MEDICAID

## 2023-08-30 VITALS
HEART RATE: 73 BPM | SYSTOLIC BLOOD PRESSURE: 138 MMHG | DIASTOLIC BLOOD PRESSURE: 96 MMHG | WEIGHT: 254.19 LBS | BODY MASS INDEX: 41.03 KG/M2

## 2023-08-30 DIAGNOSIS — F41.1 GENERALIZED ANXIETY DISORDER: ICD-10-CM

## 2023-08-30 DIAGNOSIS — F90.0 ATTENTION DEFICIT HYPERACTIVITY DISORDER (ADHD), PREDOMINANTLY INATTENTIVE TYPE: Primary | ICD-10-CM

## 2023-08-30 PROCEDURE — 99214 OFFICE O/P EST MOD 30 MIN: CPT | Mod: HP,HB,S$PBB, | Performed by: PSYCHOLOGIST

## 2023-08-30 PROCEDURE — 3075F SYST BP GE 130 - 139MM HG: CPT | Mod: CPTII,,, | Performed by: PSYCHOLOGIST

## 2023-08-30 PROCEDURE — 99212 OFFICE O/P EST SF 10 MIN: CPT | Mod: PBBFAC | Performed by: PSYCHOLOGIST

## 2023-08-30 PROCEDURE — 90833 PSYTX W PT W E/M 30 MIN: CPT | Mod: HP,HB,S$PBB, | Performed by: PSYCHOLOGIST

## 2023-08-30 PROCEDURE — 3080F DIAST BP >= 90 MM HG: CPT | Mod: CPTII,,, | Performed by: PSYCHOLOGIST

## 2023-08-30 PROCEDURE — 90833 PR PSYCHOTHERAPY W/PATIENT W/E&M, 30 MIN (ADD ON): ICD-10-PCS | Mod: HP,HB,S$PBB, | Performed by: PSYCHOLOGIST

## 2023-08-30 PROCEDURE — 1159F MED LIST DOCD IN RCRD: CPT | Mod: CPTII,,, | Performed by: PSYCHOLOGIST

## 2023-08-30 PROCEDURE — 99999 PR PBB SHADOW E&M-EST. PATIENT-LVL II: ICD-10-PCS | Mod: PBBFAC,HB,, | Performed by: PSYCHOLOGIST

## 2023-08-30 PROCEDURE — 1159F PR MEDICATION LIST DOCUMENTED IN MEDICAL RECORD: ICD-10-PCS | Mod: CPTII,,, | Performed by: PSYCHOLOGIST

## 2023-08-30 PROCEDURE — 3080F PR MOST RECENT DIASTOLIC BLOOD PRESSURE >= 90 MM HG: ICD-10-PCS | Mod: CPTII,,, | Performed by: PSYCHOLOGIST

## 2023-08-30 PROCEDURE — 99214 PR OFFICE/OUTPT VISIT, EST, LEVL IV, 30-39 MIN: ICD-10-PCS | Mod: HP,HB,S$PBB, | Performed by: PSYCHOLOGIST

## 2023-08-30 PROCEDURE — 3075F PR MOST RECENT SYSTOLIC BLOOD PRESS GE 130-139MM HG: ICD-10-PCS | Mod: CPTII,,, | Performed by: PSYCHOLOGIST

## 2023-08-30 PROCEDURE — 99999 PR PBB SHADOW E&M-EST. PATIENT-LVL II: CPT | Mod: PBBFAC,HB,, | Performed by: PSYCHOLOGIST

## 2023-08-30 PROCEDURE — 3008F BODY MASS INDEX DOCD: CPT | Mod: CPTII,,, | Performed by: PSYCHOLOGIST

## 2023-08-30 PROCEDURE — 3008F PR BODY MASS INDEX (BMI) DOCUMENTED: ICD-10-PCS | Mod: CPTII,,, | Performed by: PSYCHOLOGIST

## 2023-08-30 RX ORDER — TRIAMCINOLONE ACETONIDE 1 MG/G
CREAM TOPICAL
COMMUNITY
Start: 2023-08-22

## 2023-08-30 RX ORDER — DEXTROAMPHETAMINE SACCHARATE, AMPHETAMINE ASPARTATE, DEXTROAMPHETAMINE SULFATE AND AMPHETAMINE SULFATE 3.75; 3.75; 3.75; 3.75 MG/1; MG/1; MG/1; MG/1
15 TABLET ORAL DAILY
Qty: 30 TABLET | Refills: 0 | Status: SHIPPED | OUTPATIENT
Start: 2023-08-30 | End: 2023-09-29

## 2023-08-30 RX ORDER — DEXTROAMPHETAMINE SACCHARATE, AMPHETAMINE ASPARTATE, DEXTROAMPHETAMINE SULFATE AND AMPHETAMINE SULFATE 3.75; 3.75; 3.75; 3.75 MG/1; MG/1; MG/1; MG/1
15 TABLET ORAL DAILY
Qty: 30 TABLET | Refills: 0 | Status: SHIPPED | OUTPATIENT
Start: 2023-09-29 | End: 2023-10-18 | Stop reason: SDUPTHER

## 2023-08-30 RX ORDER — DEXTROAMPHETAMINE SACCHARATE, AMPHETAMINE ASPARTATE MONOHYDRATE, DEXTROAMPHETAMINE SULFATE AND AMPHETAMINE SULFATE 7.5; 7.5; 7.5; 7.5 MG/1; MG/1; MG/1; MG/1
30 CAPSULE, EXTENDED RELEASE ORAL EVERY MORNING
Qty: 30 CAPSULE | Refills: 0 | Status: SHIPPED | OUTPATIENT
Start: 2023-08-30 | End: 2023-09-29

## 2023-08-30 RX ORDER — DEXTROAMPHETAMINE SACCHARATE, AMPHETAMINE ASPARTATE MONOHYDRATE, DEXTROAMPHETAMINE SULFATE AND AMPHETAMINE SULFATE 7.5; 7.5; 7.5; 7.5 MG/1; MG/1; MG/1; MG/1
30 CAPSULE, EXTENDED RELEASE ORAL EVERY MORNING
Qty: 30 CAPSULE | Refills: 0 | Status: SHIPPED | OUTPATIENT
Start: 2023-10-29 | End: 2023-12-04

## 2023-08-30 RX ORDER — TRAMADOL HYDROCHLORIDE 50 MG/1
50 TABLET ORAL EVERY 6 HOURS PRN
COMMUNITY
Start: 2023-07-14

## 2023-08-30 RX ORDER — DEXTROAMPHETAMINE SACCHARATE, AMPHETAMINE ASPARTATE MONOHYDRATE, DEXTROAMPHETAMINE SULFATE AND AMPHETAMINE SULFATE 7.5; 7.5; 7.5; 7.5 MG/1; MG/1; MG/1; MG/1
30 CAPSULE, EXTENDED RELEASE ORAL EVERY MORNING
Qty: 30 CAPSULE | Refills: 0 | Status: SHIPPED | OUTPATIENT
Start: 2023-09-29 | End: 2023-10-29

## 2023-08-30 RX ORDER — VARENICLINE TARTRATE 0.5 (11)-1
KIT ORAL
COMMUNITY
Start: 2023-07-21

## 2023-08-30 RX ORDER — PANTOPRAZOLE SODIUM 40 MG/1
40 TABLET, DELAYED RELEASE ORAL
COMMUNITY
Start: 2023-08-28

## 2023-08-30 RX ORDER — FUROSEMIDE 20 MG/1
20 TABLET ORAL
COMMUNITY
Start: 2023-07-28

## 2023-08-30 RX ORDER — ALPRAZOLAM 1 MG/1
1 TABLET ORAL NIGHTLY
Qty: 30 TABLET | Refills: 2 | Status: SHIPPED | OUTPATIENT
Start: 2023-08-30 | End: 2023-12-04 | Stop reason: SDUPTHER

## 2023-08-30 NOTE — PATIENT INSTRUCTIONS

## 2023-08-30 NOTE — PROGRESS NOTES
"Outpatient Psychiatry Follow-Up Visit    8/30/2023      Chief Complaint:  Laura Vora is a 53 y.o. female who presents today for follow-up of anxiety and inattention/distractibility .       Impressions/Plan from last visit: Laura attended her virtual visit. She was last seen by me in December of 2021. She said that she had not been working and is working now and trying to learn a new system. She said that she was working at [place] and providers there were prescribing for her. She said that her Xanax had been increased. She said that about twice a month, she wakes up from her sleep from a panic attack--the 1 mg helps her. She had seen a provider at [facility], but they reportedly stopped taking her ins. She is working at [facility] [employer] in [place]. She has to be at work for 7 am--usually takes the IR around noon, unless she drinks coffee that morning. We considered changing to [facility] but agreed to continue as currently prescribed for now. Her PCP is now with [facility] in [place]. She also reported trouble sleeping sometimes--liked the trazodone.    Plan--continue Adderall XR 30 mg (sent 3 scripts); Xanax 1 mg #30 per 90 days; trazodone 100 mg hs     Interval History and Content of Current Session: Laura attended her visit. She has been stressed with work. 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 She has been at [employer] part-time and has wanted a full time job. The new job is better pay overall, and they pay for her gas for her travel. She also sometimes drives [employer] and [employer]. She feels like she needs to be busy. She loves her work, especially working with "elders" and has a spiritual outlook. She has not been taking trazodone for sleep because she would reportedly have trouble getting up in the mornings. She has been taking Xanax at night to help with sleep and likes it--she usually takes 1/2 tab. We considered doxepin but agreed to continue without adding " "another medicine at this time. When we next week, we will decrease her Xanax to #15 per month. She does not regularly take the Adderall 15 mg--only some days during the week when working longer hours. Her BP was elevated today--she said that she had just taken her BP medicine when she got to the [place]; is a little stressed about needing to be at work because another MA called out. See plan below.    Plan--continue Adderall XR 30 mg (sent 3 scripts); Adderall 15 mg afternoons (sent 2 scripts); Xanax 1 mg prn; monitor BP     since May 2023.        PSYCHOTHERAPY      Site: [facility]  Time: 16 minutes  Participants: Met with patient    Therapeutic Intervention Type: behavior modifying psychotherapy, supportive psychotherapy  Why chosen therapy is appropriate versus another modality: patient responds to this modality, evidence based practice    Target symptoms: distractability, anxiety , work stress  Primary focus: see above    Outcome monitoring methods: self-report, observation    Patient's response to intervention:  The patient's response to intervention is accepting.    Progress toward goals:  The patient's progress toward goals is good.    ------------------------------------------------------------------------------------------------------------------  Prior medicines: Lexapro (did not help), Ambien, Trazodone, Xanax ("Xanabar"), Cymbalta, Seroquel, trazodone        8/30/2023     7:24 AM 5/17/2023    10:35 AM 5/30/2022    11:32 AM   GAD7   1. Feeling nervous, anxious, or on edge? 0 0 3   2. Not being able to stop or control worrying? 0 2 2   3. Worrying too much about different things? 0 2 3   4. Trouble relaxing? 3 3 3   5. Being so restless that it is hard to sit still? 3 3 3   6. Becoming easily annoyed or irritable? 0 3 3   7. Feeling afraid as if something awful might happen? 0 0 2   CAM-7 Score 6 13 19     0-4 = Minimal anxiety  5-9 = Mild anxiety  10-14 = Moderate anxiety  15-21 = Severe " anxiety         12/9/2021     4:20 PM   Depression Patient Health Questionnaire   Over the last two weeks how often have you been bothered by little interest or pleasure in doing things Not at all   Over the last two weeks how often have you been bothered by feeling down, depressed or hopeless Not at all   PHQ-2 Total Score 0      0-4 = No intervention  5 to 9 = Mild  10 to 14 = Moderate  15 to 19 = Moderately severe  =20 = Severe    Review of Systems   PSYCHIATRIC: Pertinant items are noted in the narrative.    Past Medical, Family and Social History: The patient's past medical, family and social history have been reviewed and updated as appropriate within the electronic medical record - see encounter notes.    Social History     Socioeconomic History    Marital status: Single   Tobacco Use    Smoking status: Every Day     Current packs/day: 0.25     Types: Cigarettes    Smokeless tobacco: Never   Substance and Sexual Activity    Alcohol use: Yes     Comment: occasionally    Drug use: No         Current Outpatient Medications:     albuterol (PROVENTIL/VENTOLIN HFA) 90 mcg/actuation inhaler, Inhale 2 puffs into the lungs every 4 (four) hours as needed for Wheezing., Disp: 12 g, Rfl: 0    albuterol-ipratropium (DUO-NEB) 2.5 mg-0.5 mg/3 mL nebulizer solution, 3 ml as needed, Disp: , Rfl:     ALPRAZolam (XANAX) 1 MG tablet, Take 1 tablet (1 mg total) by mouth every evening., Disp: 30 tablet, Rfl: 2    amLODIPine (NORVASC) 10 MG tablet, Take 10 mg by mouth every evening., Disp: , Rfl:     azelastine (ASTELIN) 137 mcg (0.1 %) nasal spray, 1 spray (137 mcg total) by Nasal route 2 (two) times daily., Disp: 30 mL, Rfl: 5    cetirizine (ZYRTEC) 10 MG tablet, Take 1 tablet (10 mg total) by mouth once daily., Disp: 30 tablet, Rfl: 1    dextroamphetamine-amphetamine (ADDERALL XR) 30 MG 24 hr capsule, Take 1 capsule (30 mg total) by mouth every morning., Disp: 30 capsule, Rfl: 0    [START ON 9/29/2023]  dextroamphetamine-amphetamine (ADDERALL XR) 30 MG 24 hr capsule, Take 1 capsule (30 mg total) by mouth every morning., Disp: 30 capsule, Rfl: 0    [START ON 10/29/2023] dextroamphetamine-amphetamine (ADDERALL XR) 30 MG 24 hr capsule, Take 1 capsule (30 mg total) by mouth every morning., Disp: 30 capsule, Rfl: 0    dextroamphetamine-amphetamine (ADDERALL) 15 mg tablet, Take 1 tablet (15 mg total) by mouth once daily., Disp: 30 tablet, Rfl: 0    [START ON 9/29/2023] dextroamphetamine-amphetamine (ADDERALL) 15 mg tablet, Take 1 tablet (15 mg total) by mouth once daily., Disp: 30 tablet, Rfl: 0    EPINEPHrine (EPIPEN) 0.3 mg/0.3 mL AtIn, Inject 0.3 mLs (0.3 mg total) into the muscle once. for 1 dose, Disp: 2 each, Rfl: 3    fluticasone propionate (FLONASE) 50 mcg/actuation nasal spray, SPRAY 1 SPRAY INTO EACH NOSTRIL TWICE A DAY, Disp: , Rfl:     furosemide (LASIX) 20 MG tablet, Take 20 mg by mouth., Disp: , Rfl:     ibuprofen (ADVIL,MOTRIN) 800 MG tablet, Take 800 mg by mouth 2 (two) times daily as needed., Disp: , Rfl:     levocetirizine (XYZAL) 5 MG tablet, Take 1 tablet (5 mg total) by mouth every evening., Disp: 30 tablet, Rfl: 11    loratadine (CLARITIN) 10 mg tablet, Take 10 mg by mouth., Disp: , Rfl:     montelukast (SINGULAIR) 10 mg tablet, Take 1 tablet (10 mg total) by mouth once daily., Disp: 30 tablet, Rfl: 5    oxybutynin (DITROPAN-XL) 10 MG 24 hr tablet, Take 10 mg by mouth., Disp: , Rfl:     pantoprazole (PROTONIX) 40 MG tablet, Take 40 mg by mouth., Disp: , Rfl:     traMADoL (ULTRAM) 50 mg tablet, Take 50 mg by mouth every 6 (six) hours as needed., Disp: , Rfl:     triamcinolone acetonide 0.1% (KENALOG) 0.1 % cream, as needed., Disp: , Rfl:     varenicline (CHANTIX STEPH) 0.5 mg (11)- 1 mg (42) tablet, Take by mouth., Disp: , Rfl:     Compliance: yes    Side effects: see above    Risk Parameters:  Patient reports no suicidal ideation  Patient reports no homicidal ideation  Patient reports no  "self-injurious behavior  Patient reports no violent behavior    Exam (detailed: at least 9 elements; comprehensive: all 15 elements)   Constitutional  Vitals:  Most recent vital signs were reviewed.   Last 3 sets of Vitals        1/24/2023    10:39 AM 2/7/2023    11:58 AM 8/30/2023     8:10 AM   Vitals - 1 value per visit   SYSTOLIC   138   DIASTOLIC   96   Pulse   73   Weight (lb) 270.06 270 254.19   Weight (kg) 122.5 122.471 115.3   Height 5' 6" (1.676 m) 5' 6" (1.676 m)    BMI (Calculated) 43.6 43.6    Pain Score Nine Zero           General:  age appropriate, casually dressed, neatly groomed, hair blonde highlights--pulled back with high braid bun     Musculoskeletal  Muscle Strength/Tone:  no tremor, no tic   Gait & Station:  non-ataxic     Psychiatric  Speech:  no latency; no press   Behavior: wnl   Mood & Affect:  Stressed with getting to work-will be by herself  congruent and appropriate   Thought Process:  normal and logical   Associations:  intact   Thought Content:  normal, no suicidality, no homicidality, delusions, or paranoia   Insight:  intact   Judgement: behavior is adequate to circumstances   Orientation:  grossly intact   Memory: intact for content of interview   Language: grossly intact   Attention Span & Concentration:  Grossly intact   Fund of Knowledge:  intact and appropriate to age and level of education     Assessment and Diagnosis   Status/Progress: Based on the examination today, the patient's problem(s) is/are adequately but not ideally controlled.  New problems have not been presented today.   Co-morbidities and psychosocial stressors  are complicating management of the primary condition.  There are no active rule-out diagnoses for this patient at this time.     General Impression:     Encounter Diagnoses   Name Primary?    Attention deficit hyperactivity disorder (ADHD), predominantly inattentive type Yes    Generalized anxiety disorder        Intervention/Counseling/Treatment Plan "   Medication Management: Discussed risks, benefits, and alternatives to treatment plan documented above with patient. I answered all patient questions related to this plan, and patient expressed understanding and agreement.   continue Adderall XR 30 mg (sent 3 scripts); Adderall 15 mg afternoons (sent 2 scripts); Xanax 1 mg prn  Consider counseling  Monitor BP    Medication List with Changes/Refills   New Medications    DEXTROAMPHETAMINE-AMPHETAMINE (ADDERALL XR) 30 MG 24 HR CAPSULE    Take 1 capsule (30 mg total) by mouth every morning.    DEXTROAMPHETAMINE-AMPHETAMINE (ADDERALL XR) 30 MG 24 HR CAPSULE    Take 1 capsule (30 mg total) by mouth every morning.    DEXTROAMPHETAMINE-AMPHETAMINE (ADDERALL XR) 30 MG 24 HR CAPSULE    Take 1 capsule (30 mg total) by mouth every morning.    DEXTROAMPHETAMINE-AMPHETAMINE (ADDERALL) 15 MG TABLET    Take 1 tablet (15 mg total) by mouth once daily.   Current Medications    ALBUTEROL (PROVENTIL/VENTOLIN HFA) 90 MCG/ACTUATION INHALER    Inhale 2 puffs into the lungs every 4 (four) hours as needed for Wheezing.    ALBUTEROL-IPRATROPIUM (DUO-NEB) 2.5 MG-0.5 MG/3 ML NEBULIZER SOLUTION    3 ml as needed    AMLODIPINE (NORVASC) 10 MG TABLET    Take 10 mg by mouth every evening.    AZELASTINE (ASTELIN) 137 MCG (0.1 %) NASAL SPRAY    1 spray (137 mcg total) by Nasal route 2 (two) times daily.    CETIRIZINE (ZYRTEC) 10 MG TABLET    Take 1 tablet (10 mg total) by mouth once daily.    DEXTROAMPHETAMINE-AMPHETAMINE (ADDERALL XR) 30 MG 24 HR CAPSULE    Take 1 capsule (30 mg total) by mouth every morning.    EPINEPHRINE (EPIPEN) 0.3 MG/0.3 ML ATIN    Inject 0.3 mLs (0.3 mg total) into the muscle once. for 1 dose    FLUTICASONE PROPIONATE (FLONASE) 50 MCG/ACTUATION NASAL SPRAY    SPRAY 1 SPRAY INTO EACH NOSTRIL TWICE A DAY    FUROSEMIDE (LASIX) 20 MG TABLET    Take 20 mg by mouth.    IBUPROFEN (ADVIL,MOTRIN) 800 MG TABLET    Take 800 mg by mouth 2 (two) times daily as needed.    LEVOCETIRIZINE  (XYZAL) 5 MG TABLET    Take 1 tablet (5 mg total) by mouth every evening.    LORATADINE (CLARITIN) 10 MG TABLET    Take 10 mg by mouth.    MONTELUKAST (SINGULAIR) 10 MG TABLET    Take 1 tablet (10 mg total) by mouth once daily.    OXYBUTYNIN (DITROPAN-XL) 10 MG 24 HR TABLET    Take 10 mg by mouth.    PANTOPRAZOLE (PROTONIX) 40 MG TABLET    Take 40 mg by mouth.    TRAMADOL (ULTRAM) 50 MG TABLET    Take 50 mg by mouth every 6 (six) hours as needed.    TRIAMCINOLONE ACETONIDE 0.1% (KENALOG) 0.1 % CREAM    as needed.    VARENICLINE (CHANTIX STEPH) 0.5 MG (11)- 1 MG (42) TABLET    Take by mouth.   Changed and/or Refilled Medications    Modified Medication Previous Medication    ALPRAZOLAM (XANAX) 1 MG TABLET ALPRAZolam (XANAX) 1 MG tablet       Take 1 tablet (1 mg total) by mouth every evening.    Take 1 tablet (1 mg total) by mouth every evening.    DEXTROAMPHETAMINE-AMPHETAMINE (ADDERALL) 15 MG TABLET dextroamphetamine-amphetamine (ADDERALL) 15 mg tablet       Take 1 tablet (15 mg total) by mouth once daily.    Take 1 tablet (15 mg total) by mouth once daily.   Discontinued Medications    ALPRAZOLAM (XANAX) 1 MG TABLET    Take 1 tablet (1 mg total) by mouth every evening. for 20 days    GABAPENTIN (NEURONTIN) 300 MG CAPSULE    Take 1 capsule (300 mg total) by mouth 3 (three) times daily.    INDOMETHACIN (INDOCIN) 50 MG CAPSULE    Take 50 mg by mouth as needed.    METHOCARBAMOL (ROBAXIN) 500 MG TAB    Take 500 mg by mouth 2 (two) times daily as needed.    NICOTINE (NICODERM CQ) 21 MG/24 HR    1 patch to skin    OMEPRAZOLE (PRILOSEC) 40 MG CAPSULE    Take 1 capsule (40 mg total) by mouth once daily.    OZEMPIC 1 MG/DOSE (4 MG/3 ML)    SMARTSI.75 Milliliter(s) SUB-Q Once a Week        Return to Clinic: 3 months      I spent an additional 27 minutes performing E/M services with >50% spent on counseling, guidance, coordinating care (not Psychotherapy related) in addition to the 16 minutes performing Psychotherapy.    Time  spent with pt including note preparation: 43 minutes       Anitha Peña, PhD, MP  Advanced Practice Medical Psychologist  [facility]  [address]  [place] [id number] [phone]   [phone] fax

## 2023-10-18 ENCOUNTER — PATIENT MESSAGE (OUTPATIENT)
Dept: PSYCHIATRY | Facility: CLINIC | Age: 53
End: 2023-10-18
Payer: MEDICAID

## 2023-10-18 DIAGNOSIS — F90.0 ATTENTION DEFICIT HYPERACTIVITY DISORDER (ADHD), PREDOMINANTLY INATTENTIVE TYPE: ICD-10-CM

## 2023-10-18 RX ORDER — DEXTROAMPHETAMINE SACCHARATE, AMPHETAMINE ASPARTATE, DEXTROAMPHETAMINE SULFATE AND AMPHETAMINE SULFATE 3.75; 3.75; 3.75; 3.75 MG/1; MG/1; MG/1; MG/1
15 TABLET ORAL DAILY
Qty: 30 TABLET | Refills: 0 | Status: SHIPPED | OUTPATIENT
Start: 2023-10-18 | End: 2023-11-17

## 2023-10-18 NOTE — TELEPHONE ENCOUNTER
Patient has an appt on 11/27/23 with Dr. Peña  Regular pharmacy is on back order  Would medication to go pharmacy below just this one time

## 2023-12-04 ENCOUNTER — OFFICE VISIT (OUTPATIENT)
Dept: PSYCHIATRY | Facility: CLINIC | Age: 53
End: 2023-12-04
Payer: MEDICAID

## 2023-12-04 DIAGNOSIS — F90.0 ATTENTION DEFICIT HYPERACTIVITY DISORDER (ADHD), PREDOMINANTLY INATTENTIVE TYPE: Primary | ICD-10-CM

## 2023-12-04 DIAGNOSIS — F43.21 GRIEF: ICD-10-CM

## 2023-12-04 DIAGNOSIS — F41.1 GENERALIZED ANXIETY DISORDER: ICD-10-CM

## 2023-12-04 PROCEDURE — 1159F PR MEDICATION LIST DOCUMENTED IN MEDICAL RECORD: ICD-10-PCS | Mod: CPTII,95,, | Performed by: PSYCHOLOGIST

## 2023-12-04 PROCEDURE — 1159F MED LIST DOCD IN RCRD: CPT | Mod: CPTII,95,, | Performed by: PSYCHOLOGIST

## 2023-12-04 PROCEDURE — 99214 PR OFFICE/OUTPT VISIT, EST, LEVL IV, 30-39 MIN: ICD-10-PCS | Mod: HP,HB,95, | Performed by: PSYCHOLOGIST

## 2023-12-04 PROCEDURE — 99214 OFFICE O/P EST MOD 30 MIN: CPT | Mod: HP,HB,95, | Performed by: PSYCHOLOGIST

## 2023-12-04 RX ORDER — DEXTROAMPHETAMINE SACCHARATE, AMPHETAMINE ASPARTATE MONOHYDRATE, DEXTROAMPHETAMINE SULFATE AND AMPHETAMINE SULFATE 7.5; 7.5; 7.5; 7.5 MG/1; MG/1; MG/1; MG/1
30 CAPSULE, EXTENDED RELEASE ORAL EVERY MORNING
Qty: 30 CAPSULE | Refills: 0 | Status: SHIPPED | OUTPATIENT
Start: 2024-01-03 | End: 2024-01-24 | Stop reason: SDUPTHER

## 2023-12-04 RX ORDER — DEXTROAMPHETAMINE SACCHARATE, AMPHETAMINE ASPARTATE, DEXTROAMPHETAMINE SULFATE AND AMPHETAMINE SULFATE 3.75; 3.75; 3.75; 3.75 MG/1; MG/1; MG/1; MG/1
TABLET ORAL
Qty: 30 TABLET | Refills: 0 | Status: SHIPPED | OUTPATIENT
Start: 2024-01-03 | End: 2024-02-02

## 2023-12-04 RX ORDER — DEXTROAMPHETAMINE SACCHARATE, AMPHETAMINE ASPARTATE, DEXTROAMPHETAMINE SULFATE AND AMPHETAMINE SULFATE 3.75; 3.75; 3.75; 3.75 MG/1; MG/1; MG/1; MG/1
TABLET ORAL
Qty: 30 TABLET | Refills: 0 | Status: SHIPPED | OUTPATIENT
Start: 2023-12-04 | End: 2024-01-24 | Stop reason: SDUPTHER

## 2023-12-04 RX ORDER — DEXTROAMPHETAMINE SACCHARATE, AMPHETAMINE ASPARTATE, DEXTROAMPHETAMINE SULFATE AND AMPHETAMINE SULFATE 3.75; 3.75; 3.75; 3.75 MG/1; MG/1; MG/1; MG/1
TABLET ORAL
Qty: 30 TABLET | Refills: 0 | Status: SHIPPED | OUTPATIENT
Start: 2024-02-02 | End: 2024-02-26

## 2023-12-04 RX ORDER — DEXTROAMPHETAMINE SACCHARATE, AMPHETAMINE ASPARTATE MONOHYDRATE, DEXTROAMPHETAMINE SULFATE AND AMPHETAMINE SULFATE 7.5; 7.5; 7.5; 7.5 MG/1; MG/1; MG/1; MG/1
30 CAPSULE, EXTENDED RELEASE ORAL EVERY MORNING
Qty: 30 CAPSULE | Refills: 0 | Status: SHIPPED | OUTPATIENT
Start: 2023-12-04 | End: 2024-01-03

## 2023-12-04 RX ORDER — ALPRAZOLAM 1 MG/1
1 TABLET ORAL NIGHTLY
Qty: 30 TABLET | Refills: 2 | Status: SHIPPED | OUTPATIENT
Start: 2023-12-04 | End: 2024-02-26 | Stop reason: SDUPTHER

## 2023-12-04 RX ORDER — DEXTROAMPHETAMINE SACCHARATE, AMPHETAMINE ASPARTATE MONOHYDRATE, DEXTROAMPHETAMINE SULFATE AND AMPHETAMINE SULFATE 7.5; 7.5; 7.5; 7.5 MG/1; MG/1; MG/1; MG/1
30 CAPSULE, EXTENDED RELEASE ORAL EVERY MORNING
Qty: 30 CAPSULE | Refills: 0 | Status: SHIPPED | OUTPATIENT
Start: 2024-02-02 | End: 2024-02-26

## 2023-12-04 NOTE — PROGRESS NOTES
"Outpatient Psychiatry Follow-Up Visit    12/4/2023      Timeframe: Corona Virus Outbreak     The patient location is: Patient's car/ Patient reported that his/her location at the time of this visit was in the [facility]     Visit type: Virtual visit with synchronous audio and video     Each patient to whom he or she provides medical services by telehealth is: (1) informed of the relationship between the medical psychologist and patient and the respective role of any other health care provider with respect to management of the patient; and (2) notified that he or she may decline to receive medical services by telehealth and may withdraw from such care at any time.    I also informed patient of the following:   Anitha Peña, PhD, MPAP:  [place] medical license number: [id number]    My contact info:  [facility] / 2nd Floor  [address] [place] [place] [address]   Ph: [phone]    If technology issues, call office phone: Ph: [phone]  If crisis: Dial 911 or go to nearest Emergency Room (ER)  If questions related to privacy practices: contact [facility] Information Department: [phone]    Chief Complaint:  Laura Vora is a 53 y.o. female who presents today for follow-up of anxiety and inattention/distractibility .       Impressions/Plan from last visit: Laura attended her visit. She has been stressed with work. 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 She has been at [employer] part-time and has wanted a full time job. The new job is better pay overall, and they pay for her gas for her travel. She also sometimes drives [employer]. She feels like she needs to be busy. She loves her work, especially working with "elders" and has a spiritual outlook. She has not been taking trazodone for sleep because she would reportedly have trouble getting up in the mornings. She has been taking Xanax at " "night to help with sleep and likes it--she usually takes 1/2 tab. We considered doxepin but agreed to continue without adding another medicine at this time. When we next week, we will decrease her Xanax to #15 per month. She does not regularly take the Adderall 15 mg--only some days during the week when working longer hours. Her BP was elevated today--she said that she had just taken her BP medicine when she got to the [place]; is a little stressed about needing to be at work because another MA called out. See plan below.    Plan--continue Adderall XR 30 mg (sent 3 scripts); Adderall 15 mg afternoons (sent 2 scripts); Xanax 1 mg prn; monitor BP     Interval History and Content of Current Session: Laura attended her virtual visit. She said that her medicines are working well. She has had some family losses--one niece  (had MS and breast cancer); a nephew was killed in a hit and run. She was having some problems with sleeping--was taking a whole Xanax at night to help with her sleep. She does think that this will get better once things settle some--there is a lot of grief right now in her family. Her niece's 10/-y/o-son (with Autism) was taken by his dad after the  (shock to the family). They are hoping to have things worked out soon. We agreed to continue her medicines as noted. She reported that she is allergic to gabapentin.    Plan--continue Adderall XR 30 mg and Adderall 15 mg afternoons (sent 3 scripts of each); Xanax 1 mg prn; monitor BP     since 2023.          ------------------------------------------------------------------------------------------------------------------  Prior medicines: Lexapro (did not help), Ambien, Trazodone, Xanax ("Xanabar"), Cymbalta, Seroquel, trazodone        2023    12:57 PM 2023     7:24 AM 2023    10:35 AM   GAD7   1. Feeling nervous, anxious, or on edge? 0 0 0   2. Not being able to stop or control worrying? 0 0 2   3. Worrying too much about " different things? 0 0 2   4. Trouble relaxing? 0 3 3   5. Being so restless that it is hard to sit still? 2 3 3   6. Becoming easily annoyed or irritable? 0 0 3   7. Feeling afraid as if something awful might happen? 0 0 0   CAM-7 Score 2 6 13     0-4 = Minimal anxiety  5-9 = Mild anxiety  10-14 = Moderate anxiety  15-21 = Severe anxiety         12/9/2021     4:20 PM   Depression Patient Health Questionnaire   Over the last two weeks how often have you been bothered by little interest or pleasure in doing things Not at all   Over the last two weeks how often have you been bothered by feeling down, depressed or hopeless Not at all   PHQ-2 Total Score 0      0-4 = No intervention  5 to 9 = Mild  10 to 14 = Moderate  15 to 19 = Moderately severe  =20 = Severe    Review of Systems   PSYCHIATRIC: Pertinant items are noted in the narrative.    Past Medical, Family and Social History: The patient's past medical, family and social history have been reviewed and updated as appropriate within the electronic medical record - see encounter notes.    Social History     Socioeconomic History    Marital status: Single   Tobacco Use    Smoking status: Every Day     Current packs/day: 0.25     Types: Cigarettes    Smokeless tobacco: Never   Substance and Sexual Activity    Alcohol use: Yes     Comment: occasionally    Drug use: No         Current Outpatient Medications:     albuterol (PROVENTIL/VENTOLIN HFA) 90 mcg/actuation inhaler, Inhale 2 puffs into the lungs every 4 (four) hours as needed for Wheezing., Disp: 12 g, Rfl: 0    albuterol-ipratropium (DUO-NEB) 2.5 mg-0.5 mg/3 mL nebulizer solution, 3 ml as needed, Disp: , Rfl:     ALPRAZolam (XANAX) 1 MG tablet, Take 1 tablet (1 mg total) by mouth every evening., Disp: 30 tablet, Rfl: 2    amLODIPine (NORVASC) 10 MG tablet, Take 10 mg by mouth every evening., Disp: , Rfl:     azelastine (ASTELIN) 137 mcg (0.1 %) nasal spray, 1 spray (137 mcg total) by Nasal route 2 (two) times  daily., Disp: 30 mL, Rfl: 5    cetirizine (ZYRTEC) 10 MG tablet, Take 1 tablet (10 mg total) by mouth once daily., Disp: 30 tablet, Rfl: 1    dextroamphetamine-amphetamine (ADDERALL XR) 30 MG 24 hr capsule, Take 1 capsule (30 mg total) by mouth every morning., Disp: 30 capsule, Rfl: 0    [START ON 1/3/2024] dextroamphetamine-amphetamine (ADDERALL XR) 30 MG 24 hr capsule, Take 1 capsule (30 mg total) by mouth every morning., Disp: 30 capsule, Rfl: 0    [START ON 2/2/2024] dextroamphetamine-amphetamine (ADDERALL XR) 30 MG 24 hr capsule, Take 1 capsule (30 mg total) by mouth every morning., Disp: 30 capsule, Rfl: 0    dextroamphetamine-amphetamine (ADDERALL) 15 mg tablet, Give 1 tablet by mouth in afternoon, Disp: 30 tablet, Rfl: 0    [START ON 1/3/2024] dextroamphetamine-amphetamine (ADDERALL) 15 mg tablet, Give 1 tablet by mouth in afternoon, Disp: 30 tablet, Rfl: 0    [START ON 2/2/2024] dextroamphetamine-amphetamine (ADDERALL) 15 mg tablet, Give 1 tablet by mouth in afternoon, Disp: 30 tablet, Rfl: 0    EPINEPHrine (EPIPEN) 0.3 mg/0.3 mL AtIn, Inject 0.3 mLs (0.3 mg total) into the muscle once. for 1 dose, Disp: 2 each, Rfl: 3    fluticasone propionate (FLONASE) 50 mcg/actuation nasal spray, SPRAY 1 SPRAY INTO EACH NOSTRIL TWICE A DAY, Disp: , Rfl:     furosemide (LASIX) 20 MG tablet, Take 20 mg by mouth., Disp: , Rfl:     ibuprofen (ADVIL,MOTRIN) 800 MG tablet, Take 800 mg by mouth 2 (two) times daily as needed., Disp: , Rfl:     levocetirizine (XYZAL) 5 MG tablet, Take 1 tablet (5 mg total) by mouth every evening., Disp: 30 tablet, Rfl: 11    loratadine (CLARITIN) 10 mg tablet, Take 10 mg by mouth., Disp: , Rfl:     montelukast (SINGULAIR) 10 mg tablet, Take 1 tablet (10 mg total) by mouth once daily., Disp: 30 tablet, Rfl: 5    oxybutynin (DITROPAN-XL) 10 MG 24 hr tablet, Take 10 mg by mouth., Disp: , Rfl:     pantoprazole (PROTONIX) 40 MG tablet, Take 40 mg by mouth., Disp: , Rfl:     traMADoL (ULTRAM) 50 mg  "tablet, Take 50 mg by mouth every 6 (six) hours as needed., Disp: , Rfl:     triamcinolone acetonide 0.1% (KENALOG) 0.1 % cream, as needed., Disp: , Rfl:     varenicline (CHANTIX STEPH) 0.5 mg (11)- 1 mg (42) tablet, Take by mouth., Disp: , Rfl:     Compliance: yes    Side effects: see above    Risk Parameters:  Patient reports no suicidal ideation  Patient reports no homicidal ideation  Patient reports no self-injurious behavior  Patient reports no violent behavior    Exam (detailed: at least 9 elements; comprehensive: all 15 elements)   Constitutional  Vitals:  Most recent vital signs were reviewed.   Last 3 sets of Vitals        1/24/2023    10:39 AM 2/7/2023    11:58 AM 8/30/2023     8:10 AM   Vitals - 1 value per visit   SYSTOLIC   138   DIASTOLIC   96   Pulse   73   Weight (lb) 270.06 270 254.19   Weight (kg) 122.5 122.471 115.3   Height 5' 6" (1.676 m) 5' 6" (1.676 m)    BMI (Calculated) 43.6 43.6    Pain Score Nine Zero           General:  age appropriate, casually dressed, neatly groomed, hair pulled back     Musculoskeletal  Muscle Strength/Tone:  no tremor, no tic   Gait & Station:  video visit     Psychiatric  Speech:  no latency; no press   Behavior: wnl   Mood & Affect:  anxious, depressed  congruent and appropriate   Thought Process:  normal and logical   Associations:  intact   Thought Content:  normal, no suicidality, no homicidality, delusions, or paranoia   Insight:  intact   Judgement: behavior is adequate to circumstances   Orientation:  grossly intact   Memory: intact for content of interview   Language: grossly intact   Attention Span & Concentration:  Grossly intact   Fund of Knowledge:  intact and appropriate to age and level of education     Assessment and Diagnosis   Status/Progress: Based on the examination today, the patient's problem(s) is/are adequately but not ideally controlled.  New problems have been presented today.   Co-morbidities and psychosocial stressors  are complicating " management of the primary condition.  There are no active rule-out diagnoses for this patient at this time.     General Impression:     Encounter Diagnoses   Name Primary?    Attention deficit hyperactivity disorder (ADHD), predominantly inattentive type Yes    Generalized anxiety disorder     Grief        Intervention/Counseling/Treatment Plan   Medication Management: Discussed risks, benefits, and alternatives to treatment plan documented above with patient. I answered all patient questions related to this plan, and patient expressed understanding and agreement.   continue Adderall XR 30 mg and Adderall 15 mg afternoons (sent 3 scripts of each)  Consider counseling  Monitor BP    Medication List with Changes/Refills   New Medications    DEXTROAMPHETAMINE-AMPHETAMINE (ADDERALL XR) 30 MG 24 HR CAPSULE    Take 1 capsule (30 mg total) by mouth every morning.    DEXTROAMPHETAMINE-AMPHETAMINE (ADDERALL XR) 30 MG 24 HR CAPSULE    Take 1 capsule (30 mg total) by mouth every morning.    DEXTROAMPHETAMINE-AMPHETAMINE (ADDERALL XR) 30 MG 24 HR CAPSULE    Take 1 capsule (30 mg total) by mouth every morning.    DEXTROAMPHETAMINE-AMPHETAMINE (ADDERALL) 15 MG TABLET    Give 1 tablet by mouth in afternoon    DEXTROAMPHETAMINE-AMPHETAMINE (ADDERALL) 15 MG TABLET    Give 1 tablet by mouth in afternoon    DEXTROAMPHETAMINE-AMPHETAMINE (ADDERALL) 15 MG TABLET    Give 1 tablet by mouth in afternoon   Current Medications    ALBUTEROL (PROVENTIL/VENTOLIN HFA) 90 MCG/ACTUATION INHALER    Inhale 2 puffs into the lungs every 4 (four) hours as needed for Wheezing.    ALBUTEROL-IPRATROPIUM (DUO-NEB) 2.5 MG-0.5 MG/3 ML NEBULIZER SOLUTION    3 ml as needed    AMLODIPINE (NORVASC) 10 MG TABLET    Take 10 mg by mouth every evening.    AZELASTINE (ASTELIN) 137 MCG (0.1 %) NASAL SPRAY    1 spray (137 mcg total) by Nasal route 2 (two) times daily.    CETIRIZINE (ZYRTEC) 10 MG TABLET    Take 1 tablet (10 mg total) by mouth once daily.    EPINEPHRINE  (EPIPEN) 0.3 MG/0.3 ML ATIN    Inject 0.3 mLs (0.3 mg total) into the muscle once. for 1 dose    FLUTICASONE PROPIONATE (FLONASE) 50 MCG/ACTUATION NASAL SPRAY    SPRAY 1 SPRAY INTO EACH NOSTRIL TWICE A DAY    FUROSEMIDE (LASIX) 20 MG TABLET    Take 20 mg by mouth.    IBUPROFEN (ADVIL,MOTRIN) 800 MG TABLET    Take 800 mg by mouth 2 (two) times daily as needed.    LEVOCETIRIZINE (XYZAL) 5 MG TABLET    Take 1 tablet (5 mg total) by mouth every evening.    LORATADINE (CLARITIN) 10 MG TABLET    Take 10 mg by mouth.    MONTELUKAST (SINGULAIR) 10 MG TABLET    Take 1 tablet (10 mg total) by mouth once daily.    OXYBUTYNIN (DITROPAN-XL) 10 MG 24 HR TABLET    Take 10 mg by mouth.    PANTOPRAZOLE (PROTONIX) 40 MG TABLET    Take 40 mg by mouth.    TRAMADOL (ULTRAM) 50 MG TABLET    Take 50 mg by mouth every 6 (six) hours as needed.    TRIAMCINOLONE ACETONIDE 0.1% (KENALOG) 0.1 % CREAM    as needed.    VARENICLINE (CHANTIX STEPH) 0.5 MG (11)- 1 MG (42) TABLET    Take by mouth.   Changed and/or Refilled Medications    Modified Medication Previous Medication    ALPRAZOLAM (XANAX) 1 MG TABLET ALPRAZolam (XANAX) 1 MG tablet       Take 1 tablet (1 mg total) by mouth every evening.    Take 1 tablet (1 mg total) by mouth every evening.   Discontinued Medications    DEXTROAMPHETAMINE-AMPHETAMINE (ADDERALL XR) 30 MG 24 HR CAPSULE    Take 1 capsule (30 mg total) by mouth every morning. for 14 days    DEXTROAMPHETAMINE-AMPHETAMINE (ADDERALL XR) 30 MG 24 HR CAPSULE    Take 1 capsule (30 mg total) by mouth every morning.    DEXTROAMPHETAMINE-AMPHETAMINE (ADDERALL XR) 30 MG 24 HR CAPSULE    Take 1 capsule (30 mg total) by mouth every morning.    DEXTROAMPHETAMINE-AMPHETAMINE (ADDERALL XR) 30 MG 24 HR CAPSULE    Take 1 capsule (30 mg total) by mouth every morning.    DEXTROAMPHETAMINE-AMPHETAMINE (ADDERALL XR) 30 MG 24 HR CAPSULE    Take 1 capsule (30 mg total) by mouth every morning.    DEXTROAMPHETAMINE-AMPHETAMINE (ADDERALL XR) 30 MG 24 HR  CAPSULE    Take 1 capsule (30 mg total) by mouth every morning.    DEXTROAMPHETAMINE-AMPHETAMINE (ADDERALL XR) 30 MG 24 HR CAPSULE    Take 1 capsule (30 mg total) by mouth every morning.    DEXTROAMPHETAMINE-AMPHETAMINE (ADDERALL XR) 30 MG 24 HR CAPSULE    Take 1 capsule (30 mg total) by mouth every morning.    DEXTROAMPHETAMINE-AMPHETAMINE (ADDERALL XR) 30 MG 24 HR CAPSULE    Take 1 capsule (30 mg total) by mouth every morning.    DEXTROAMPHETAMINE-AMPHETAMINE (ADDERALL) 15 MG TABLET    Take 1 tablet (15 mg total) by mouth once daily.    DEXTROAMPHETAMINE-AMPHETAMINE (ADDERALL) 15 MG TABLET    Take 1 tablet (15 mg total) by mouth once daily.    DEXTROAMPHETAMINE-AMPHETAMINE (ADDERALL) 15 MG TABLET    Take 1 tablet (15 mg total) by mouth once daily.        Return to Clinic: 3 months    Time spent with pt including note preparation: 23 minutes       Anitha Peña, PhD, MP  Advanced Practice Medical Psychologist  [facility]  [address] [facility].  [place] [id number]  [phone]   [phone] fax

## 2023-12-04 NOTE — PATIENT INSTRUCTIONS

## 2024-01-24 ENCOUNTER — PATIENT MESSAGE (OUTPATIENT)
Dept: PSYCHIATRY | Facility: CLINIC | Age: 54
End: 2024-01-24
Payer: MEDICAID

## 2024-01-24 DIAGNOSIS — F90.0 ATTENTION DEFICIT HYPERACTIVITY DISORDER (ADHD), PREDOMINANTLY INATTENTIVE TYPE: ICD-10-CM

## 2024-01-24 RX ORDER — DEXTROAMPHETAMINE SACCHARATE, AMPHETAMINE ASPARTATE, DEXTROAMPHETAMINE SULFATE AND AMPHETAMINE SULFATE 3.75; 3.75; 3.75; 3.75 MG/1; MG/1; MG/1; MG/1
TABLET ORAL
Qty: 30 TABLET | Refills: 0 | Status: SHIPPED | OUTPATIENT
Start: 2024-01-24 | End: 2024-02-16 | Stop reason: SDUPTHER

## 2024-01-24 RX ORDER — DEXTROAMPHETAMINE SACCHARATE, AMPHETAMINE ASPARTATE MONOHYDRATE, DEXTROAMPHETAMINE SULFATE AND AMPHETAMINE SULFATE 7.5; 7.5; 7.5; 7.5 MG/1; MG/1; MG/1; MG/1
30 CAPSULE, EXTENDED RELEASE ORAL EVERY MORNING
Qty: 30 CAPSULE | Refills: 0 | Status: SHIPPED | OUTPATIENT
Start: 2024-01-24 | End: 2024-02-16 | Stop reason: SDUPTHER

## 2024-02-15 ENCOUNTER — PATIENT MESSAGE (OUTPATIENT)
Dept: PSYCHIATRY | Facility: CLINIC | Age: 54
End: 2024-02-15
Payer: MEDICAID

## 2024-02-16 DIAGNOSIS — F90.0 ATTENTION DEFICIT HYPERACTIVITY DISORDER (ADHD), PREDOMINANTLY INATTENTIVE TYPE: ICD-10-CM

## 2024-02-16 RX ORDER — DEXTROAMPHETAMINE SACCHARATE, AMPHETAMINE ASPARTATE, DEXTROAMPHETAMINE SULFATE AND AMPHETAMINE SULFATE 3.75; 3.75; 3.75; 3.75 MG/1; MG/1; MG/1; MG/1
TABLET ORAL
Qty: 30 TABLET | Refills: 0 | Status: SHIPPED | OUTPATIENT
Start: 2024-02-16 | End: 2024-02-26

## 2024-02-16 RX ORDER — DEXTROAMPHETAMINE SACCHARATE, AMPHETAMINE ASPARTATE MONOHYDRATE, DEXTROAMPHETAMINE SULFATE AND AMPHETAMINE SULFATE 7.5; 7.5; 7.5; 7.5 MG/1; MG/1; MG/1; MG/1
30 CAPSULE, EXTENDED RELEASE ORAL EVERY MORNING
Qty: 30 CAPSULE | Refills: 0 | Status: SHIPPED | OUTPATIENT
Start: 2024-02-16 | End: 2024-02-26

## 2024-02-22 ENCOUNTER — TELEPHONE (OUTPATIENT)
Dept: PSYCHIATRY | Facility: CLINIC | Age: 54
End: 2024-02-22
Payer: MEDICAID

## 2024-02-26 ENCOUNTER — OFFICE VISIT (OUTPATIENT)
Dept: PSYCHIATRY | Facility: CLINIC | Age: 54
End: 2024-02-26
Payer: MEDICAID

## 2024-02-26 DIAGNOSIS — F41.1 GENERALIZED ANXIETY DISORDER: ICD-10-CM

## 2024-02-26 DIAGNOSIS — F90.0 ATTENTION DEFICIT HYPERACTIVITY DISORDER (ADHD), PREDOMINANTLY INATTENTIVE TYPE: Primary | ICD-10-CM

## 2024-02-26 PROCEDURE — 1159F MED LIST DOCD IN RCRD: CPT | Mod: CPTII,95,, | Performed by: PSYCHOLOGIST

## 2024-02-26 PROCEDURE — 99214 OFFICE O/P EST MOD 30 MIN: CPT | Mod: HP,HB,95, | Performed by: PSYCHOLOGIST

## 2024-02-26 RX ORDER — DEXTROAMPHETAMINE SACCHARATE, AMPHETAMINE ASPARTATE, DEXTROAMPHETAMINE SULFATE AND AMPHETAMINE SULFATE 3.75; 3.75; 3.75; 3.75 MG/1; MG/1; MG/1; MG/1
TABLET ORAL
Qty: 30 TABLET | Refills: 0 | Status: SHIPPED | OUTPATIENT
Start: 2024-02-26 | End: 2024-05-27

## 2024-02-26 RX ORDER — DEXTROAMPHETAMINE SACCHARATE, AMPHETAMINE ASPARTATE, DEXTROAMPHETAMINE SULFATE AND AMPHETAMINE SULFATE 3.75; 3.75; 3.75; 3.75 MG/1; MG/1; MG/1; MG/1
TABLET ORAL
Qty: 30 TABLET | Refills: 0 | Status: SHIPPED | OUTPATIENT
Start: 2024-04-26 | End: 2024-05-27

## 2024-02-26 RX ORDER — DEXTROAMPHETAMINE SACCHARATE, AMPHETAMINE ASPARTATE MONOHYDRATE, DEXTROAMPHETAMINE SULFATE AND AMPHETAMINE SULFATE 7.5; 7.5; 7.5; 7.5 MG/1; MG/1; MG/1; MG/1
30 CAPSULE, EXTENDED RELEASE ORAL EVERY MORNING
Qty: 30 CAPSULE | Refills: 0 | Status: SHIPPED | OUTPATIENT
Start: 2024-03-27 | End: 2024-05-27

## 2024-02-26 RX ORDER — DEXTROAMPHETAMINE SACCHARATE, AMPHETAMINE ASPARTATE MONOHYDRATE, DEXTROAMPHETAMINE SULFATE AND AMPHETAMINE SULFATE 7.5; 7.5; 7.5; 7.5 MG/1; MG/1; MG/1; MG/1
30 CAPSULE, EXTENDED RELEASE ORAL EVERY MORNING
Qty: 30 CAPSULE | Refills: 0 | Status: SHIPPED | OUTPATIENT
Start: 2024-04-26 | End: 2024-05-27

## 2024-02-26 RX ORDER — DEXTROAMPHETAMINE SACCHARATE, AMPHETAMINE ASPARTATE MONOHYDRATE, DEXTROAMPHETAMINE SULFATE AND AMPHETAMINE SULFATE 7.5; 7.5; 7.5; 7.5 MG/1; MG/1; MG/1; MG/1
30 CAPSULE, EXTENDED RELEASE ORAL EVERY MORNING
Qty: 30 CAPSULE | Refills: 0 | Status: SHIPPED | OUTPATIENT
Start: 2024-02-26 | End: 2024-04-21

## 2024-02-26 RX ORDER — DEXTROAMPHETAMINE SACCHARATE, AMPHETAMINE ASPARTATE, DEXTROAMPHETAMINE SULFATE AND AMPHETAMINE SULFATE 3.75; 3.75; 3.75; 3.75 MG/1; MG/1; MG/1; MG/1
TABLET ORAL
Qty: 30 TABLET | Refills: 0 | Status: SHIPPED | OUTPATIENT
Start: 2024-03-27 | End: 2024-05-27

## 2024-02-26 RX ORDER — ALPRAZOLAM 1 MG/1
1 TABLET ORAL NIGHTLY
Qty: 30 TABLET | Refills: 2 | Status: SHIPPED | OUTPATIENT
Start: 2024-02-26 | End: 2024-05-27 | Stop reason: SDUPTHER

## 2024-02-26 NOTE — PROGRESS NOTES
Outpatient Psychiatry Follow-Up Visit    2024      Timeframe: Corona Virus Outbreak     The patient location is: Patient's work/ Patient reported that his/her location at the time of this visit was in the [facility]     Visit type: Virtual visit with synchronous audio and video     Each patient to whom he or she provides medical services by telehealth is: (1) informed of the relationship between the medical psychologist and patient and the respective role of any other health care provider with respect to management of the patient; and (2) notified that he or she may decline to receive medical services by telehealth and may withdraw from such care at any time.    I also informed patient of the following:   Anitha Peña, PhD, MPAP:  [place] medical license number: [id number]    My contact info:  [facility] / 2nd Floor  [address] [place] [address]   Ph: [phone]    If technology issues, call office phone: Ph: [phone]  If crisis: Dial 911 or go to nearest Emergency Room (ER)  If questions related to privacy practices: contact [facility] Information Department: [phone]    Chief Complaint:  Laura Vora is a 53 y.o. female who presents today for follow-up of anxiety and inattention/distractibility .       Impressions/Plan from last visit: Laura attended her virtual visit. She said that her medicines are working well. She has had some family losses--one niece  (had MS and breast cancer); a nephew was killed in a hit and run. She was having some problems with sleeping--was taking a whole Xanax at night to help with her sleep. She does think that this will get better once things settle some--there is a lot of grief right now in her family. Her niece's 10/-y/o-son (with Autism) was taken by his dad after the  (shock to the family). They are hoping to have things worked out soon. We agreed to continue her medicines as noted. She  "reported that she is allergic to gabapentin.    Plan--continue Adderall XR 30 mg and Adderall 15 mg afternoons (sent 3 scripts of each); Xanax 1 mg prn; monitor BP     Interval History and Content of Current Session: Laura attended her virtual visit. She said that she is doing about the same--doing fine as long as she has her medicine. We are continuing as currently prescribed.     Plan--continue Adderall XR 30 mg and Adderall 15 mg afternoons (sent 3 scripts of each); Xanax 1 mg prn; monitor BP     since December 2023.          ------------------------------------------------------------------------------------------------------------------  Prior medicines: Lexapro (did not help), Ambien, Trazodone, Xanax ("Xanabar"), Cymbalta, Seroquel, trazodone        2/26/2024     1:30 PM 12/4/2023    12:57 PM 8/30/2023     7:24 AM   GAD7   1. Feeling nervous, anxious, or on edge? 1 0 0   2. Not being able to stop or control worrying? 0 0 0   3. Worrying too much about different things? 3 0 0   4. Trouble relaxing? 1 0 3   5. Being so restless that it is hard to sit still? 3 2 3   6. Becoming easily annoyed or irritable? 3 0 0   7. Feeling afraid as if something awful might happen? 0 0 0   CAM-7 Score 11 2 6     0-4 = Minimal anxiety  5-9 = Mild anxiety  10-14 = Moderate anxiety  15-21 = Severe anxiety         12/9/2021     4:20 PM   Depression Patient Health Questionnaire   Over the last two weeks how often have you been bothered by little interest or pleasure in doing things Not at all   Over the last two weeks how often have you been bothered by feeling down, depressed or hopeless Not at all   PHQ-2 Total Score 0      0-4 = No intervention  5 to 9 = Mild  10 to 14 = Moderate  15 to 19 = Moderately severe  =20 = Severe    Review of Systems   PSYCHIATRIC: Pertinant items are noted in the narrative.    Past Medical, Family and Social History: The patient's past medical, family and social history have been reviewed and " updated as appropriate within the electronic medical record - see encounter notes.    Social History     Socioeconomic History    Marital status: Single   Tobacco Use    Smoking status: Every Day     Current packs/day: 0.25     Types: Cigarettes    Smokeless tobacco: Never   Substance and Sexual Activity    Alcohol use: Yes     Comment: occasionally    Drug use: No         Current Outpatient Medications:     albuterol (PROVENTIL/VENTOLIN HFA) 90 mcg/actuation inhaler, Inhale 2 puffs into the lungs every 4 (four) hours as needed for Wheezing., Disp: 12 g, Rfl: 0    albuterol-ipratropium (DUO-NEB) 2.5 mg-0.5 mg/3 mL nebulizer solution, 3 ml as needed, Disp: , Rfl:     ALPRAZolam (XANAX) 1 MG tablet, Take 1 tablet (1 mg total) by mouth every evening., Disp: 30 tablet, Rfl: 2    amLODIPine (NORVASC) 10 MG tablet, Take 10 mg by mouth every evening., Disp: , Rfl:     azelastine (ASTELIN) 137 mcg (0.1 %) nasal spray, 1 spray (137 mcg total) by Nasal route 2 (two) times daily., Disp: 30 mL, Rfl: 5    cetirizine (ZYRTEC) 10 MG tablet, Take 1 tablet (10 mg total) by mouth once daily., Disp: 30 tablet, Rfl: 1    dextroamphetamine-amphetamine (ADDERALL XR) 30 MG 24 hr capsule, Take 1 capsule (30 mg total) by mouth every morning., Disp: 30 capsule, Rfl: 0    dextroamphetamine-amphetamine (ADDERALL XR) 30 MG 24 hr capsule, Take 1 capsule (30 mg total) by mouth every morning., Disp: 30 capsule, Rfl: 0    dextroamphetamine-amphetamine (ADDERALL) 15 mg tablet, Give 1 tablet by mouth in afternoon, Disp: 30 tablet, Rfl: 0    dextroamphetamine-amphetamine (ADDERALL) 15 mg tablet, Take 1 tablet by mouth in afternoon, Disp: 30 tablet, Rfl: 0    EPINEPHrine (EPIPEN) 0.3 mg/0.3 mL AtIn, Inject 0.3 mLs (0.3 mg total) into the muscle once. for 1 dose, Disp: 2 each, Rfl: 3    fluticasone propionate (FLONASE) 50 mcg/actuation nasal spray, SPRAY 1 SPRAY INTO EACH NOSTRIL TWICE A DAY, Disp: , Rfl:     furosemide (LASIX) 20 MG tablet, Take 20 mg  "by mouth., Disp: , Rfl:     ibuprofen (ADVIL,MOTRIN) 800 MG tablet, Take 800 mg by mouth 2 (two) times daily as needed., Disp: , Rfl:     levocetirizine (XYZAL) 5 MG tablet, Take 1 tablet (5 mg total) by mouth every evening., Disp: 30 tablet, Rfl: 11    loratadine (CLARITIN) 10 mg tablet, Take 10 mg by mouth., Disp: , Rfl:     montelukast (SINGULAIR) 10 mg tablet, Take 1 tablet (10 mg total) by mouth once daily., Disp: 30 tablet, Rfl: 5    oxybutynin (DITROPAN-XL) 10 MG 24 hr tablet, Take 10 mg by mouth., Disp: , Rfl:     pantoprazole (PROTONIX) 40 MG tablet, Take 40 mg by mouth., Disp: , Rfl:     traMADoL (ULTRAM) 50 mg tablet, Take 50 mg by mouth every 6 (six) hours as needed., Disp: , Rfl:     triamcinolone acetonide 0.1% (KENALOG) 0.1 % cream, as needed., Disp: , Rfl:     varenicline (CHANTIX STEPH) 0.5 mg (11)- 1 mg (42) tablet, Take by mouth., Disp: , Rfl:     Compliance: yes    Side effects: see above    Risk Parameters:  Patient reports no suicidal ideation  Patient reports no homicidal ideation  Patient reports no self-injurious behavior  Patient reports no violent behavior    Exam (detailed: at least 9 elements; comprehensive: all 15 elements)   Constitutional  Vitals:  Most recent vital signs were reviewed.   Last 3 sets of Vitals        1/24/2023    10:39 AM 2/7/2023    11:58 AM 8/30/2023     8:10 AM   Vitals - 1 value per visit   SYSTOLIC   138   DIASTOLIC   96   Pulse   73   Weight (lb) 270.06 270 254.19   Weight (kg) 122.5 122.471 115.3   Height 5' 6" (1.676 m) 5' 6" (1.676 m)    BMI (Calculated) 43.6 43.6    Pain Score Nine Zero           General:  age appropriate, neatly groomed, wearing scrubs, hair pulled back     Musculoskeletal  Muscle Strength/Tone:  no tremor, no tic   Gait & Station:  video visit     Psychiatric  Speech:  no latency; no press   Behavior: wnl   Mood & Affect:  euthymic  congruent and appropriate   Thought Process:  normal and logical   Associations:  intact   Thought Content:  " normal, no suicidality, no homicidality, delusions, or paranoia   Insight:  intact   Judgement: behavior is adequate to circumstances   Orientation:  grossly intact   Memory: intact for content of interview   Language: grossly intact   Attention Span & Concentration:  Grossly intact   Fund of Knowledge:  intact and appropriate to age and level of education     Assessment and Diagnosis   Status/Progress: Based on the examination today, the patient's problem(s) is/are improved.  New problems have not been presented today.   Co-morbidities and psychosocial stressors  are complicating management of the primary condition.  There are no active rule-out diagnoses for this patient at this time.     General Impression:     Encounter Diagnoses   Name Primary?    Attention deficit hyperactivity disorder (ADHD), predominantly inattentive type Yes    Generalized anxiety disorder        Intervention/Counseling/Treatment Plan   Medication Management: Discussed risks, benefits, and alternatives to treatment plan documented above with patient. I answered all patient questions related to this plan, and patient expressed understanding and agreement.   continue Adderall XR 30 mg and Adderall 15 mg afternoons (sent 3 scripts of each)  Consider counseling  Monitor BP    Medication List with Changes/Refills   Current Medications    ALBUTEROL (PROVENTIL/VENTOLIN HFA) 90 MCG/ACTUATION INHALER    Inhale 2 puffs into the lungs every 4 (four) hours as needed for Wheezing.    ALBUTEROL-IPRATROPIUM (DUO-NEB) 2.5 MG-0.5 MG/3 ML NEBULIZER SOLUTION    3 ml as needed    ALPRAZOLAM (XANAX) 1 MG TABLET    Take 1 tablet (1 mg total) by mouth every evening.    AMLODIPINE (NORVASC) 10 MG TABLET    Take 10 mg by mouth every evening.    AZELASTINE (ASTELIN) 137 MCG (0.1 %) NASAL SPRAY    1 spray (137 mcg total) by Nasal route 2 (two) times daily.    CETIRIZINE (ZYRTEC) 10 MG TABLET    Take 1 tablet (10 mg total) by mouth once daily.     DEXTROAMPHETAMINE-AMPHETAMINE (ADDERALL XR) 30 MG 24 HR CAPSULE    Take 1 capsule (30 mg total) by mouth every morning.    DEXTROAMPHETAMINE-AMPHETAMINE (ADDERALL XR) 30 MG 24 HR CAPSULE    Take 1 capsule (30 mg total) by mouth every morning.    DEXTROAMPHETAMINE-AMPHETAMINE (ADDERALL) 15 MG TABLET    Give 1 tablet by mouth in afternoon    DEXTROAMPHETAMINE-AMPHETAMINE (ADDERALL) 15 MG TABLET    Give 1 tablet by mouth in afternoon    DEXTROAMPHETAMINE-AMPHETAMINE (ADDERALL) 15 MG TABLET    Take 1 tablet by mouth in afternoon    EPINEPHRINE (EPIPEN) 0.3 MG/0.3 ML ATIN    Inject 0.3 mLs (0.3 mg total) into the muscle once. for 1 dose    FLUTICASONE PROPIONATE (FLONASE) 50 MCG/ACTUATION NASAL SPRAY    SPRAY 1 SPRAY INTO EACH NOSTRIL TWICE A DAY    FUROSEMIDE (LASIX) 20 MG TABLET    Take 20 mg by mouth.    IBUPROFEN (ADVIL,MOTRIN) 800 MG TABLET    Take 800 mg by mouth 2 (two) times daily as needed.    LEVOCETIRIZINE (XYZAL) 5 MG TABLET    Take 1 tablet (5 mg total) by mouth every evening.    LORATADINE (CLARITIN) 10 MG TABLET    Take 10 mg by mouth.    MONTELUKAST (SINGULAIR) 10 MG TABLET    Take 1 tablet (10 mg total) by mouth once daily.    OXYBUTYNIN (DITROPAN-XL) 10 MG 24 HR TABLET    Take 10 mg by mouth.    PANTOPRAZOLE (PROTONIX) 40 MG TABLET    Take 40 mg by mouth.    TRAMADOL (ULTRAM) 50 MG TABLET    Take 50 mg by mouth every 6 (six) hours as needed.    TRIAMCINOLONE ACETONIDE 0.1% (KENALOG) 0.1 % CREAM    as needed.    VARENICLINE (CHANTIX STEPH) 0.5 MG (11)- 1 MG (42) TABLET    Take by mouth.        Return to Clinic: 3 months    Time spent with pt including note preparation: 12 minutes       Anitha Peña, PhD, MP  Advanced Practice Medical Psychologist  [facility]  [place] [id number]  [phone]   [phone] fax

## 2024-02-26 NOTE — PATIENT INSTRUCTIONS

## 2024-04-25 DIAGNOSIS — F90.0 ATTENTION DEFICIT HYPERACTIVITY DISORDER (ADHD), PREDOMINANTLY INATTENTIVE TYPE: ICD-10-CM

## 2024-04-25 RX ORDER — DEXTROAMPHETAMINE SACCHARATE, AMPHETAMINE ASPARTATE MONOHYDRATE, DEXTROAMPHETAMINE SULFATE AND AMPHETAMINE SULFATE 7.5; 7.5; 7.5; 7.5 MG/1; MG/1; MG/1; MG/1
30 CAPSULE, EXTENDED RELEASE ORAL EVERY MORNING
Qty: 30 CAPSULE | Refills: 0 | Status: CANCELLED | OUTPATIENT
Start: 2024-04-25 | End: 2024-05-25

## 2024-05-23 ENCOUNTER — TELEPHONE (OUTPATIENT)
Dept: PSYCHIATRY | Facility: CLINIC | Age: 54
End: 2024-05-23
Payer: MEDICAID

## 2024-05-27 ENCOUNTER — OFFICE VISIT (OUTPATIENT)
Dept: PSYCHIATRY | Facility: CLINIC | Age: 54
End: 2024-05-27
Payer: MEDICAID

## 2024-05-27 DIAGNOSIS — F90.0 ATTENTION DEFICIT HYPERACTIVITY DISORDER (ADHD), PREDOMINANTLY INATTENTIVE TYPE: Primary | ICD-10-CM

## 2024-05-27 DIAGNOSIS — F41.1 GENERALIZED ANXIETY DISORDER: ICD-10-CM

## 2024-05-27 PROCEDURE — 99214 OFFICE O/P EST MOD 30 MIN: CPT | Mod: HP,HB,95, | Performed by: PSYCHOLOGIST

## 2024-05-27 PROCEDURE — 1159F MED LIST DOCD IN RCRD: CPT | Mod: CPTII,95,, | Performed by: PSYCHOLOGIST

## 2024-05-27 RX ORDER — DEXTROAMPHETAMINE SACCHARATE, AMPHETAMINE ASPARTATE MONOHYDRATE, DEXTROAMPHETAMINE SULFATE AND AMPHETAMINE SULFATE 7.5; 7.5; 7.5; 7.5 MG/1; MG/1; MG/1; MG/1
30 CAPSULE, EXTENDED RELEASE ORAL EVERY MORNING
Qty: 30 CAPSULE | Refills: 0 | Status: SHIPPED | OUTPATIENT
Start: 2024-05-27 | End: 2024-06-26

## 2024-05-27 RX ORDER — DEXTROAMPHETAMINE SACCHARATE, AMPHETAMINE ASPARTATE, DEXTROAMPHETAMINE SULFATE AND AMPHETAMINE SULFATE 3.75; 3.75; 3.75; 3.75 MG/1; MG/1; MG/1; MG/1
TABLET ORAL
Qty: 30 TABLET | Refills: 0 | Status: SHIPPED | OUTPATIENT
Start: 2024-05-27

## 2024-05-27 RX ORDER — DEXTROAMPHETAMINE SACCHARATE, AMPHETAMINE ASPARTATE MONOHYDRATE, DEXTROAMPHETAMINE SULFATE AND AMPHETAMINE SULFATE 7.5; 7.5; 7.5; 7.5 MG/1; MG/1; MG/1; MG/1
30 CAPSULE, EXTENDED RELEASE ORAL EVERY MORNING
Qty: 30 CAPSULE | Refills: 0 | Status: SHIPPED | OUTPATIENT
Start: 2024-07-26 | End: 2024-08-25

## 2024-05-27 RX ORDER — DEXTROAMPHETAMINE SACCHARATE, AMPHETAMINE ASPARTATE, DEXTROAMPHETAMINE SULFATE AND AMPHETAMINE SULFATE 3.75; 3.75; 3.75; 3.75 MG/1; MG/1; MG/1; MG/1
TABLET ORAL
Qty: 30 TABLET | Refills: 0 | Status: SHIPPED | OUTPATIENT
Start: 2024-07-26

## 2024-05-27 RX ORDER — ALPRAZOLAM 1 MG/1
1 TABLET ORAL NIGHTLY
Qty: 30 TABLET | Refills: 5 | Status: SHIPPED | OUTPATIENT
Start: 2024-05-27 | End: 2024-11-23

## 2024-05-27 RX ORDER — DEXTROAMPHETAMINE SACCHARATE, AMPHETAMINE ASPARTATE MONOHYDRATE, DEXTROAMPHETAMINE SULFATE AND AMPHETAMINE SULFATE 7.5; 7.5; 7.5; 7.5 MG/1; MG/1; MG/1; MG/1
30 CAPSULE, EXTENDED RELEASE ORAL EVERY MORNING
Qty: 30 CAPSULE | Refills: 0 | Status: SHIPPED | OUTPATIENT
Start: 2024-06-26 | End: 2024-07-26

## 2024-05-27 RX ORDER — DEXTROAMPHETAMINE SACCHARATE, AMPHETAMINE ASPARTATE, DEXTROAMPHETAMINE SULFATE AND AMPHETAMINE SULFATE 3.75; 3.75; 3.75; 3.75 MG/1; MG/1; MG/1; MG/1
TABLET ORAL
Qty: 30 TABLET | Refills: 0 | Status: SHIPPED | OUTPATIENT
Start: 2024-06-26

## 2024-05-27 NOTE — PATIENT INSTRUCTIONS

## 2024-05-27 NOTE — PROGRESS NOTES
"Outpatient Psychiatry Follow-Up Visit    5/27/2024      Timeframe: Corona Virus Outbreak     The patient location is: Patient's home/ Patient reported that his/her location at the time of this visit was in the [facility]     Visit type: Virtual visit with synchronous audio and video     Each patient to whom he or she provides medical services by telehealth is: (1) informed of the relationship between the medical psychologist and patient and the respective role of any other health care provider with respect to management of the patient; and (2) notified that he or she may decline to receive medical services by telehealth and may withdraw from such care at any time.    I also informed patient of the following:   Anitha Peañ, PhD, MPAP:  [place] medical license number: [id number]    My contact info:  [facility] / 2nd Floor  [address] [place] [address]   Ph: [phone]    If technology issues, call office phone: Ph: [phone]  If crisis: Dial 911 or go to nearest Emergency Room (ER)  If questions related to privacy practices: contact [facility] Information Department: [phone]    Chief Complaint:  Laura Vora is a 53 y.o. female who presents today for follow-up of anxiety and inattention/distractibility .       LAST VISIT: Laura attended her virtual visit. She said that she is doing about the same--doing fine as long as she has her medicine. We are continuing as currently prescribed.     Plan--continue Adderall XR 30 mg and Adderall 15 mg afternoons (sent 3 scripts of each); Xanax 1 mg prn; monitor BP     CURRENT PRESENTATION: Laura attended her virtual visit, though she had some early problems with her camera. She said that she has been doing "real good." She reported that she has been taking Xanax at night sometimes to help with sleep and for her anxiety. She said that she has been having some "menopause" symptoms with anxiety/panic at night.  " "She said that she 1st started taking Xanax after she lost both of her parents within a short period of time.  She reported only taking it now about 4 out of 7 nights.  We talked about her taking it off of automatic refill and her just feeling it when she needs it.  She denied having any problems with her medication. She and her firose marie are doing well--they had been friends for 18 years or more. They get along well--they sometimes get their grandson/grandkids.  We agreed to continue her medicines as currently prescribed.    Plan--continue Adderall XR 30 mg and Adderall 15 mg afternoons (sent 3 scripts of each); Xanax 1 mg prn; monitor BP    Lives with [facility]     since February 2024.          ------------------------------------------------------------------------------------------------------------------  Prior medicines: Lexapro (did not help), Ambien, Trazodone, Xanax ("Xanabar"), Cymbalta, Seroquel, trazodone        5/27/2024     8:50 AM 2/26/2024     1:30 PM 12/4/2023    12:57 PM   GAD7   1. Feeling nervous, anxious, or on edge? 0 1 0   2. Not being able to stop or control worrying? 0 0 0   3. Worrying too much about different things? 0 3 0   4. Trouble relaxing? 0 1 0   5. Being so restless that it is hard to sit still? 3 3 2   6. Becoming easily annoyed or irritable? 0 3 0   7. Feeling afraid as if something awful might happen? 0 0 0   CAM-7 Score 3 11 2     0-4 = Minimal anxiety  5-9 = Mild anxiety  10-14 = Moderate anxiety  15-21 = Severe anxiety         12/9/2021     4:20 PM   Depression Patient Health Questionnaire   Over the last two weeks how often have you been bothered by little interest or pleasure in doing things Not at all   Over the last two weeks how often have you been bothered by feeling down, depressed or hopeless Not at all   PHQ-2 Total Score 0      0-4 = No intervention  5 to 9 = Mild  10 to 14 = Moderate  15 to 19 = Moderately severe  =20 = Severe    Review of Systems "   PSYCHIATRIC: Pertinant items are noted in the narrative.    Past Medical, Family and Social History: The patient's past medical, family and social history have been reviewed and updated as appropriate within the electronic medical record - see encounter notes.    Social History     Socioeconomic History    Marital status: Single   Tobacco Use    Smoking status: Every Day     Current packs/day: 0.25     Types: Cigarettes    Smokeless tobacco: Never   Substance and Sexual Activity    Alcohol use: Yes     Comment: occasionally    Drug use: No         Current Outpatient Medications:     albuterol (PROVENTIL/VENTOLIN HFA) 90 mcg/actuation inhaler, Inhale 2 puffs into the lungs every 4 (four) hours as needed for Wheezing., Disp: 12 g, Rfl: 0    albuterol-ipratropium (DUO-NEB) 2.5 mg-0.5 mg/3 mL nebulizer solution, 3 ml as needed, Disp: , Rfl:     ALPRAZolam (XANAX) 1 MG tablet, Take 1 tablet (1 mg total) by mouth every evening., Disp: 30 tablet, Rfl: 2    amLODIPine (NORVASC) 10 MG tablet, Take 10 mg by mouth every evening., Disp: , Rfl:     azelastine (ASTELIN) 137 mcg (0.1 %) nasal spray, 1 spray (137 mcg total) by Nasal route 2 (two) times daily., Disp: 30 mL, Rfl: 5    cetirizine (ZYRTEC) 10 MG tablet, Take 1 tablet (10 mg total) by mouth once daily., Disp: 30 tablet, Rfl: 1    dextroamphetamine-amphetamine (ADDERALL XR) 30 MG 24 hr capsule, Take 1 capsule (30 mg total) by mouth every morning., Disp: 30 capsule, Rfl: 0    dextroamphetamine-amphetamine (ADDERALL) 15 mg tablet, Take 1 tablet by mouth in afternoon, Disp: 30 tablet, Rfl: 0    dextroamphetamine-amphetamine (ADDERALL) 15 mg tablet, Take 1 tablet by mouth in afternoon, Disp: 30 tablet, Rfl: 0    dextroamphetamine-amphetamine (ADDERALL) 15 mg tablet, Take 1 tablet by mouth in afternoon, Disp: 30 tablet, Rfl: 0    EPINEPHrine (EPIPEN) 0.3 mg/0.3 mL AtIn, Inject 0.3 mLs (0.3 mg total) into the muscle once. for 1 dose, Disp: 2 each, Rfl: 3    fluticasone  "propionate (FLONASE) 50 mcg/actuation nasal spray, SPRAY 1 SPRAY INTO EACH NOSTRIL TWICE A DAY, Disp: , Rfl:     furosemide (LASIX) 20 MG tablet, Take 20 mg by mouth., Disp: , Rfl:     ibuprofen (ADVIL,MOTRIN) 800 MG tablet, Take 800 mg by mouth 2 (two) times daily as needed., Disp: , Rfl:     levocetirizine (XYZAL) 5 MG tablet, Take 1 tablet (5 mg total) by mouth every evening., Disp: 30 tablet, Rfl: 11    loratadine (CLARITIN) 10 mg tablet, Take 10 mg by mouth., Disp: , Rfl:     montelukast (SINGULAIR) 10 mg tablet, Take 1 tablet (10 mg total) by mouth once daily., Disp: 30 tablet, Rfl: 5    oxybutynin (DITROPAN-XL) 10 MG 24 hr tablet, Take 10 mg by mouth., Disp: , Rfl:     pantoprazole (PROTONIX) 40 MG tablet, Take 40 mg by mouth., Disp: , Rfl:     traMADoL (ULTRAM) 50 mg tablet, Take 50 mg by mouth every 6 (six) hours as needed., Disp: , Rfl:     triamcinolone acetonide 0.1% (KENALOG) 0.1 % cream, as needed., Disp: , Rfl:     varenicline (CHANTIX STEPH) 0.5 mg (11)- 1 mg (42) tablet, Take by mouth., Disp: , Rfl:     Compliance: yes    Side effects: see above    Risk Parameters:  Patient reports no suicidal ideation  Patient reports no homicidal ideation  Patient reports no self-injurious behavior  Patient reports no violent behavior    Exam (detailed: at least 9 elements; comprehensive: all 15 elements)   Constitutional  Vitals:  Most recent vital signs were reviewed.   Last 3 sets of Vitals        1/24/2023    10:39 AM 2/7/2023    11:58 AM 8/30/2023     8:10 AM   Vitals - 1 value per visit   SYSTOLIC   138   DIASTOLIC   96   Pulse   73   Weight (lb) 270.06 270 254.19   Weight (kg) 122.5 122.471 115.3   Height 5' 6" (1.676 m) 5' 6" (1.676 m)    BMI (Calculated) 43.6 43.6    Pain Score Nine Zero           General:  age appropriate, neatly groomed, wearing scrubs, hair pulled back     Musculoskeletal  Muscle Strength/Tone:  no tremor, no tic   Gait & Station:  video visit     Psychiatric  Speech:  no latency; no " press   Behavior: wnl   Mood & Affect:  euthymic  congruent and appropriate   Thought Process:  normal and logical   Associations:  intact   Thought Content:  normal, no suicidality, no homicidality, delusions, or paranoia   Insight:  intact   Judgement: behavior is adequate to circumstances   Orientation:  grossly intact   Memory: intact for content of interview   Language: grossly intact   Attention Span & Concentration:  Grossly intact   Fund of Knowledge:  intact and appropriate to age and level of education     Assessment and Diagnosis   Status/Progress: Based on the examination today, the patient's problem(s) is/are fairly well controlled.  New problems have not been presented today.   Co-morbidities and psychosocial stressors  are complicating management of the primary condition.  There are no active rule-out diagnoses for this patient at this time.     General Impression:     Encounter Diagnoses   Name Primary?    Attention deficit hyperactivity disorder (ADHD), predominantly inattentive type Yes    Generalized anxiety disorder        Intervention/Counseling/Treatment Plan   Medication Management: Discussed risks, benefits, and alternatives to treatment plan documented above with patient. I answered all patient questions related to this plan, and patient expressed understanding and agreement.   continue Adderall XR 30 mg and Adderall 15 mg afternoons (sent 3 scripts of each); Xanax 1 mg prn  Consider counseling  Monitor BP    Medication List with Changes/Refills   Current Medications    ALBUTEROL (PROVENTIL/VENTOLIN HFA) 90 MCG/ACTUATION INHALER    Inhale 2 puffs into the lungs every 4 (four) hours as needed for Wheezing.    ALBUTEROL-IPRATROPIUM (DUO-NEB) 2.5 MG-0.5 MG/3 ML NEBULIZER SOLUTION    3 ml as needed    ALPRAZOLAM (XANAX) 1 MG TABLET    Take 1 tablet (1 mg total) by mouth every evening.    AMLODIPINE (NORVASC) 10 MG TABLET    Take 10 mg by mouth every evening.    AZELASTINE (ASTELIN) 137 MCG (0.1  %) NASAL SPRAY    1 spray (137 mcg total) by Nasal route 2 (two) times daily.    CETIRIZINE (ZYRTEC) 10 MG TABLET    Take 1 tablet (10 mg total) by mouth once daily.    DEXTROAMPHETAMINE-AMPHETAMINE (ADDERALL XR) 30 MG 24 HR CAPSULE    Take 1 capsule (30 mg total) by mouth every morning.    DEXTROAMPHETAMINE-AMPHETAMINE (ADDERALL XR) 30 MG 24 HR CAPSULE    Take 1 capsule (30 mg total) by mouth every morning.    DEXTROAMPHETAMINE-AMPHETAMINE (ADDERALL) 15 MG TABLET    Take 1 tablet by mouth in afternoon    DEXTROAMPHETAMINE-AMPHETAMINE (ADDERALL) 15 MG TABLET    Take 1 tablet by mouth in afternoon    DEXTROAMPHETAMINE-AMPHETAMINE (ADDERALL) 15 MG TABLET    Take 1 tablet by mouth in afternoon    EPINEPHRINE (EPIPEN) 0.3 MG/0.3 ML ATIN    Inject 0.3 mLs (0.3 mg total) into the muscle once. for 1 dose    FLUTICASONE PROPIONATE (FLONASE) 50 MCG/ACTUATION NASAL SPRAY    SPRAY 1 SPRAY INTO EACH NOSTRIL TWICE A DAY    FUROSEMIDE (LASIX) 20 MG TABLET    Take 20 mg by mouth.    IBUPROFEN (ADVIL,MOTRIN) 800 MG TABLET    Take 800 mg by mouth 2 (two) times daily as needed.    LEVOCETIRIZINE (XYZAL) 5 MG TABLET    Take 1 tablet (5 mg total) by mouth every evening.    LORATADINE (CLARITIN) 10 MG TABLET    Take 10 mg by mouth.    MONTELUKAST (SINGULAIR) 10 MG TABLET    Take 1 tablet (10 mg total) by mouth once daily.    OXYBUTYNIN (DITROPAN-XL) 10 MG 24 HR TABLET    Take 10 mg by mouth.    PANTOPRAZOLE (PROTONIX) 40 MG TABLET    Take 40 mg by mouth.    TRAMADOL (ULTRAM) 50 MG TABLET    Take 50 mg by mouth every 6 (six) hours as needed.    TRIAMCINOLONE ACETONIDE 0.1% (KENALOG) 0.1 % CREAM    as needed.    VARENICLINE (CHANTIX STEPH) 0.5 MG (11)- 1 MG (42) TABLET    Take by mouth.        Return to Clinic: 3 months    Time spent with pt including note preparation: 20 minutes       Anitha Peña, PhD, MP  Advanced Practice Medical Psychologist  [facility]  [id number] [place] [address].  [place] [id number]  [phone]    [phone] fax

## 2024-07-19 DIAGNOSIS — F90.0 ATTENTION DEFICIT HYPERACTIVITY DISORDER (ADHD), PREDOMINANTLY INATTENTIVE TYPE: ICD-10-CM

## 2024-07-19 RX ORDER — DEXTROAMPHETAMINE SACCHARATE, AMPHETAMINE ASPARTATE MONOHYDRATE, DEXTROAMPHETAMINE SULFATE AND AMPHETAMINE SULFATE 7.5; 7.5; 7.5; 7.5 MG/1; MG/1; MG/1; MG/1
30 CAPSULE, EXTENDED RELEASE ORAL EVERY MORNING
Qty: 30 CAPSULE | Refills: 0 | OUTPATIENT
Start: 2024-07-19 | End: 2024-08-18

## 2024-07-19 RX ORDER — DEXTROAMPHETAMINE SACCHARATE, AMPHETAMINE ASPARTATE, DEXTROAMPHETAMINE SULFATE AND AMPHETAMINE SULFATE 3.75; 3.75; 3.75; 3.75 MG/1; MG/1; MG/1; MG/1
TABLET ORAL
Qty: 30 TABLET | Refills: 0 | OUTPATIENT
Start: 2024-07-19

## 2024-07-23 DIAGNOSIS — F90.0 ATTENTION DEFICIT HYPERACTIVITY DISORDER (ADHD), PREDOMINANTLY INATTENTIVE TYPE: ICD-10-CM

## 2024-07-24 RX ORDER — DEXTROAMPHETAMINE SACCHARATE, AMPHETAMINE ASPARTATE MONOHYDRATE, DEXTROAMPHETAMINE SULFATE AND AMPHETAMINE SULFATE 7.5; 7.5; 7.5; 7.5 MG/1; MG/1; MG/1; MG/1
30 CAPSULE, EXTENDED RELEASE ORAL EVERY MORNING
Qty: 30 CAPSULE | Refills: 0 | OUTPATIENT
Start: 2024-07-24 | End: 2024-08-23

## 2024-08-22 ENCOUNTER — PATIENT MESSAGE (OUTPATIENT)
Dept: PSYCHIATRY | Facility: CLINIC | Age: 54
End: 2024-08-22
Payer: COMMERCIAL

## 2024-08-22 DIAGNOSIS — F90.0 ATTENTION DEFICIT HYPERACTIVITY DISORDER (ADHD), PREDOMINANTLY INATTENTIVE TYPE: ICD-10-CM

## 2024-08-22 RX ORDER — DEXTROAMPHETAMINE SACCHARATE, AMPHETAMINE ASPARTATE MONOHYDRATE, DEXTROAMPHETAMINE SULFATE AND AMPHETAMINE SULFATE 7.5; 7.5; 7.5; 7.5 MG/1; MG/1; MG/1; MG/1
30 CAPSULE, EXTENDED RELEASE ORAL EVERY MORNING
Qty: 30 CAPSULE | Refills: 0 | Status: SHIPPED | OUTPATIENT
Start: 2024-08-22 | End: 2024-09-22

## 2024-08-29 ENCOUNTER — OFFICE VISIT (OUTPATIENT)
Dept: PSYCHIATRY | Facility: CLINIC | Age: 54
End: 2024-08-29
Payer: COMMERCIAL

## 2024-08-29 VITALS
DIASTOLIC BLOOD PRESSURE: 92 MMHG | WEIGHT: 281.06 LBS | SYSTOLIC BLOOD PRESSURE: 133 MMHG | BODY MASS INDEX: 45.37 KG/M2

## 2024-08-29 DIAGNOSIS — F90.0 ATTENTION DEFICIT HYPERACTIVITY DISORDER (ADHD), PREDOMINANTLY INATTENTIVE TYPE: Primary | ICD-10-CM

## 2024-08-29 DIAGNOSIS — F41.1 GENERALIZED ANXIETY DISORDER: ICD-10-CM

## 2024-08-29 PROCEDURE — 99999 PR PBB SHADOW E&M-EST. PATIENT-LVL II: CPT | Mod: PBBFAC,,, | Performed by: PSYCHOLOGIST

## 2024-08-29 RX ORDER — DEXTROAMPHETAMINE SACCHARATE, AMPHETAMINE ASPARTATE, DEXTROAMPHETAMINE SULFATE AND AMPHETAMINE SULFATE 3.75; 3.75; 3.75; 3.75 MG/1; MG/1; MG/1; MG/1
TABLET ORAL
Qty: 30 TABLET | Refills: 0 | Status: SHIPPED | OUTPATIENT
Start: 2024-10-28

## 2024-08-29 RX ORDER — DEXTROAMPHETAMINE SACCHARATE, AMPHETAMINE ASPARTATE, DEXTROAMPHETAMINE SULFATE AND AMPHETAMINE SULFATE 3.75; 3.75; 3.75; 3.75 MG/1; MG/1; MG/1; MG/1
TABLET ORAL
Qty: 30 TABLET | Refills: 0 | Status: SHIPPED | OUTPATIENT
Start: 2024-08-29

## 2024-08-29 RX ORDER — DEXTROAMPHETAMINE SACCHARATE, AMPHETAMINE ASPARTATE MONOHYDRATE, DEXTROAMPHETAMINE SULFATE AND AMPHETAMINE SULFATE 7.5; 7.5; 7.5; 7.5 MG/1; MG/1; MG/1; MG/1
30 CAPSULE, EXTENDED RELEASE ORAL EVERY MORNING
Qty: 30 CAPSULE | Refills: 0 | Status: SHIPPED | OUTPATIENT
Start: 2024-09-28 | End: 2024-10-28

## 2024-08-29 RX ORDER — OMEPRAZOLE 40 MG/1
40 CAPSULE, DELAYED RELEASE ORAL
COMMUNITY

## 2024-08-29 RX ORDER — INDOMETHACIN 50 MG/1
1 CAPSULE ORAL
COMMUNITY
Start: 2024-06-17

## 2024-08-29 RX ORDER — DEXTROAMPHETAMINE SACCHARATE, AMPHETAMINE ASPARTATE, DEXTROAMPHETAMINE SULFATE AND AMPHETAMINE SULFATE 3.75; 3.75; 3.75; 3.75 MG/1; MG/1; MG/1; MG/1
TABLET ORAL
Qty: 30 TABLET | Refills: 0 | Status: SHIPPED | OUTPATIENT
Start: 2024-09-28

## 2024-08-29 RX ORDER — DEXTROAMPHETAMINE SACCHARATE, AMPHETAMINE ASPARTATE MONOHYDRATE, DEXTROAMPHETAMINE SULFATE AND AMPHETAMINE SULFATE 7.5; 7.5; 7.5; 7.5 MG/1; MG/1; MG/1; MG/1
30 CAPSULE, EXTENDED RELEASE ORAL EVERY MORNING
Qty: 30 CAPSULE | Refills: 0 | Status: SHIPPED | OUTPATIENT
Start: 2024-08-29 | End: 2024-09-28

## 2024-08-29 RX ORDER — DEXTROAMPHETAMINE SACCHARATE, AMPHETAMINE ASPARTATE MONOHYDRATE, DEXTROAMPHETAMINE SULFATE AND AMPHETAMINE SULFATE 7.5; 7.5; 7.5; 7.5 MG/1; MG/1; MG/1; MG/1
30 CAPSULE, EXTENDED RELEASE ORAL EVERY MORNING
Qty: 30 CAPSULE | Refills: 0 | Status: SHIPPED | OUTPATIENT
Start: 2024-10-28 | End: 2024-11-27

## 2024-08-29 RX ORDER — METHOCARBAMOL 500 MG/1
TABLET, FILM COATED ORAL
COMMUNITY
Start: 2024-06-17

## 2024-08-29 NOTE — PROGRESS NOTES
"Outpatient Psychiatry Follow-Up Visit    8/29/2024      Chief Complaint:  Laura Vora is a 54 y.o. female who presents today for follow-up of anxiety and inattention/distractibility .       LAST VISIT: Laura attended her virtual visit, though she had some early problems with her camera. She said that she has been doing "real good." She reported that she has been taking Xanax at night sometimes to help with sleep and for her anxiety. She said that she has been having some "menopause" symptoms with anxiety/panic at night.  She said that she 1st started taking Xanax after she lost both of her parents within a short period of time.  She reported only taking it now about 4 out of 7 nights.  We talked about her taking it off of automatic refill and her just feeling it when she needs it.  She denied having any problems with her medication. She and her fiance are doing well--they had been friends for 18 years or more. They get along well--they sometimes get their grandson/grandkids.  We agreed to continue her medicines as currently prescribed.    Plan--continue Adderall XR 30 mg and Adderall 15 mg afternoons (sent 3 scripts of each); Xanax 1 mg prn; monitor BP     CURRENT PRESENTATION: Laura attended her visit. She is doing well overall. 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 She is doing well with her medicines--we "cleaned up" her list. We are continuing her medicines as noted.    Plan--continue Adderall XR 30 mg and Adderall 15 mg afternoons (sent 3 scripts of each); Xanax 1 mg prn; monitor BP    Lives with yaritzarose marie (Deltric)  Work--[employer] under [employer] since May 2024.          ------------------------------------------------------------------------------------------------------------------  Prior medicines: Lexapro (did not help), Ambien, Trazodone, Xanax ("Xanabar"), Cymbalta, Seroquel, trazodone        8/23/2024     9:19 AM 5/27/2024     8:50 AM 2/26/2024     " 1:30 PM   GAD7   1. Feeling nervous, anxious, or on edge? 0 0 1   2. Not being able to stop or control worrying? 0 0 0   3. Worrying too much about different things? 0 0 3   4. Trouble relaxing? 3 0 1   5. Being so restless that it is hard to sit still? 3 3 3   6. Becoming easily annoyed or irritable? 3 0 3   7. Feeling afraid as if something awful might happen? 0 0 0   8. If you checked off any problems, how difficult have these problems made it for you to do your work, take care of things at home, or get along with other people? 1     CAM-7 Score 9 3 11     0-4 = Minimal anxiety  5-9 = Mild anxiety  10-14 = Moderate anxiety  15-21 = Severe anxiety         12/9/2021     4:20 PM   Depression Patient Health Questionnaire   Over the last two weeks how often have you been bothered by little interest or pleasure in doing things Not at all   Over the last two weeks how often have you been bothered by feeling down, depressed or hopeless Not at all   PHQ-2 Total Score 0      0-4 = No intervention  5 to 9 = Mild  10 to 14 = Moderate  15 to 19 = Moderately severe  =20 = Severe    Review of Systems   PSYCHIATRIC: Pertinant items are noted in the narrative.    Past Medical, Family and Social History: The patient's past medical, family and social history have been reviewed and updated as appropriate within the electronic medical record - see encounter notes.    Social History     Socioeconomic History    Marital status: Single   Tobacco Use    Smoking status: Every Day     Current packs/day: 0.25     Types: Cigarettes    Smokeless tobacco: Never   Substance and Sexual Activity    Alcohol use: Yes     Comment: occasionally    Drug use: No         Current Outpatient Medications:     indomethacin (INDOCIN) 50 MG capsule, Take 1 capsule by mouth as needed., Disp: , Rfl:     methocarbamoL (ROBAXIN) 500 MG Tab, Take by mouth as needed., Disp: , Rfl:     albuterol (PROVENTIL/VENTOLIN HFA) 90 mcg/actuation inhaler, Inhale 2 puffs into  the lungs every 4 (four) hours as needed for Wheezing., Disp: 12 g, Rfl: 0    albuterol-ipratropium (DUO-NEB) 2.5 mg-0.5 mg/3 mL nebulizer solution, 3 ml as needed, Disp: , Rfl:     ALPRAZolam (XANAX) 1 MG tablet, Take 1 tablet (1 mg total) by mouth every evening., Disp: 30 tablet, Rfl: 5    amLODIPine (NORVASC) 10 MG tablet, Take 10 mg by mouth every evening., Disp: , Rfl:     azelastine (ASTELIN) 137 mcg (0.1 %) nasal spray, 1 spray (137 mcg total) by Nasal route 2 (two) times daily., Disp: 30 mL, Rfl: 5    cetirizine (ZYRTEC) 10 MG tablet, Take 1 tablet (10 mg total) by mouth once daily., Disp: 30 tablet, Rfl: 1    dextroamphetamine-amphetamine (ADDERALL XR) 30 MG 24 hr capsule, Take 1 capsule (30 mg total) by mouth every morning., Disp: 30 capsule, Rfl: 0    [START ON 9/28/2024] dextroamphetamine-amphetamine (ADDERALL XR) 30 MG 24 hr capsule, Take 1 capsule (30 mg total) by mouth every morning., Disp: 30 capsule, Rfl: 0    [START ON 10/28/2024] dextroamphetamine-amphetamine (ADDERALL XR) 30 MG 24 hr capsule, Take 1 capsule (30 mg total) by mouth every morning., Disp: 30 capsule, Rfl: 0    dextroamphetamine-amphetamine (ADDERALL) 15 mg tablet, Take 1 tablet by mouth in the afternoon, Disp: 30 tablet, Rfl: 0    [START ON 9/28/2024] dextroamphetamine-amphetamine (ADDERALL) 15 mg tablet, Take 1 tablet by mouth in afternoon, Disp: 30 tablet, Rfl: 0    [START ON 10/28/2024] dextroamphetamine-amphetamine (ADDERALL) 15 mg tablet, Take 1 tablet by mouth in the afternoon, Disp: 30 tablet, Rfl: 0    EPINEPHrine (EPIPEN) 0.3 mg/0.3 mL AtIn, Inject 0.3 mLs (0.3 mg total) into the muscle once. for 1 dose, Disp: 2 each, Rfl: 3    fluticasone propionate (FLONASE) 50 mcg/actuation nasal spray, SPRAY 1 SPRAY INTO EACH NOSTRIL TWICE A DAY, Disp: , Rfl:     furosemide (LASIX) 20 MG tablet, Take 20 mg by mouth., Disp: , Rfl:     levocetirizine (XYZAL) 5 MG tablet, Take 1 tablet (5 mg total) by mouth every evening., Disp: 30 tablet,  "Rfl: 11    loratadine (CLARITIN) 10 mg tablet, Take 10 mg by mouth., Disp: , Rfl:     montelukast (SINGULAIR) 10 mg tablet, Take 1 tablet (10 mg total) by mouth once daily., Disp: 30 tablet, Rfl: 5    omeprazole (PRILOSEC) 40 MG capsule, Take 40 mg by mouth., Disp: , Rfl:     oxybutynin (DITROPAN-XL) 10 MG 24 hr tablet, Take 10 mg by mouth., Disp: , Rfl:     triamcinolone acetonide 0.1% (KENALOG) 0.1 % cream, as needed., Disp: , Rfl:     varenicline (CHANTIX STEPH) 0.5 mg (11)- 1 mg (42) tablet, Take by mouth., Disp: , Rfl:     Compliance: yes    Side effects: see above    Risk Parameters:  Patient reports no suicidal ideation  Patient reports no homicidal ideation  Patient reports no self-injurious behavior  Patient reports no violent behavior    Exam (detailed: at least 9 elements; comprehensive: all 15 elements)   Constitutional  Vitals:  Most recent vital signs were reviewed.   Last 3 sets of Vitals        2/7/2023    11:58 AM 8/30/2023     8:10 AM 8/29/2024     8:48 AM   Vitals - 1 value per visit   SYSTOLIC  138 133   DIASTOLIC  96 92   Pulse  73    Weight (lb) 270 254.19 281.09   Weight (kg) 122.471 115.3 127.5   Height 5' 6" (1.676 m)     BMI (Calculated) 43.6     Pain Score Zero            General:  age appropriate, neatly groomed, wearing scrubs, hair in linda and wrapped around crown     Musculoskeletal  Muscle Strength/Tone:  no tremor, no tic   Gait & Station:  non-ataxic     Psychiatric  Speech:  no latency; no press   Behavior: wnl   Mood & Affect:  euthymic  congruent and appropriate   Thought Process:  normal and logical   Associations:  intact   Thought Content:  normal, no suicidality, no homicidality, delusions, or paranoia   Insight:  intact   Judgement: behavior is adequate to circumstances   Orientation:  grossly intact   Memory: intact for content of interview   Language: grossly intact   Attention Span & Concentration:  Grossly intact   Fund of Knowledge:  intact and appropriate to age and " level of education     Assessment and Diagnosis   Status/Progress: Based on the examination today, the patient's problem(s) is/are fairly well controlled.  New problems have not been presented today.   Co-morbidities and psychosocial stressors  are complicating management of the primary condition.  There are no active rule-out diagnoses for this patient at this time.     General Impression:     Encounter Diagnoses   Name Primary?    Attention deficit hyperactivity disorder (ADHD), predominantly inattentive type Yes    Generalized anxiety disorder        Intervention/Counseling/Treatment Plan   Medication Management: Discussed risks, benefits, and alternatives to treatment plan documented above with patient. I answered all patient questions related to this plan, and patient expressed understanding and agreement.   continue Adderall XR 30 mg and Adderall 15 mg afternoons (sent 3 scripts of each); Xanax 1 mg prn  Consider counseling  Monitor BP    Medication List with Changes/Refills   New Medications    DEXTROAMPHETAMINE-AMPHETAMINE (ADDERALL XR) 30 MG 24 HR CAPSULE    Take 1 capsule (30 mg total) by mouth every morning.    DEXTROAMPHETAMINE-AMPHETAMINE (ADDERALL XR) 30 MG 24 HR CAPSULE    Take 1 capsule (30 mg total) by mouth every morning.   Current Medications    ALBUTEROL (PROVENTIL/VENTOLIN HFA) 90 MCG/ACTUATION INHALER    Inhale 2 puffs into the lungs every 4 (four) hours as needed for Wheezing.    ALBUTEROL-IPRATROPIUM (DUO-NEB) 2.5 MG-0.5 MG/3 ML NEBULIZER SOLUTION    3 ml as needed    ALPRAZOLAM (XANAX) 1 MG TABLET    Take 1 tablet (1 mg total) by mouth every evening.    AMLODIPINE (NORVASC) 10 MG TABLET    Take 10 mg by mouth every evening.    AZELASTINE (ASTELIN) 137 MCG (0.1 %) NASAL SPRAY    1 spray (137 mcg total) by Nasal route 2 (two) times daily.    CETIRIZINE (ZYRTEC) 10 MG TABLET    Take 1 tablet (10 mg total) by mouth once daily.    EPINEPHRINE (EPIPEN) 0.3 MG/0.3 ML ATIN    Inject 0.3 mLs (0.3 mg  total) into the muscle once. for 1 dose    FLUTICASONE PROPIONATE (FLONASE) 50 MCG/ACTUATION NASAL SPRAY    SPRAY 1 SPRAY INTO EACH NOSTRIL TWICE A DAY    FUROSEMIDE (LASIX) 20 MG TABLET    Take 20 mg by mouth.    INDOMETHACIN (INDOCIN) 50 MG CAPSULE    Take 1 capsule by mouth as needed.    LEVOCETIRIZINE (XYZAL) 5 MG TABLET    Take 1 tablet (5 mg total) by mouth every evening.    LORATADINE (CLARITIN) 10 MG TABLET    Take 10 mg by mouth.    METHOCARBAMOL (ROBAXIN) 500 MG TAB    Take by mouth as needed.    MONTELUKAST (SINGULAIR) 10 MG TABLET    Take 1 tablet (10 mg total) by mouth once daily.    OMEPRAZOLE (PRILOSEC) 40 MG CAPSULE    Take 40 mg by mouth.    OXYBUTYNIN (DITROPAN-XL) 10 MG 24 HR TABLET    Take 10 mg by mouth.    TRIAMCINOLONE ACETONIDE 0.1% (KENALOG) 0.1 % CREAM    as needed.    VARENICLINE (CHANTIX STEPH) 0.5 MG (11)- 1 MG (42) TABLET    Take by mouth.   Changed and/or Refilled Medications    Modified Medication Previous Medication    DEXTROAMPHETAMINE-AMPHETAMINE (ADDERALL XR) 30 MG 24 HR CAPSULE dextroamphetamine-amphetamine (ADDERALL XR) 30 MG 24 hr capsule       Take 1 capsule (30 mg total) by mouth every morning.    Take 1 capsule (30 mg total) by mouth every morning.    DEXTROAMPHETAMINE-AMPHETAMINE (ADDERALL) 15 MG TABLET dextroamphetamine-amphetamine (ADDERALL) 15 mg tablet       Take 1 tablet by mouth in the afternoon    Take 1 tablet by mouth in afternoon    DEXTROAMPHETAMINE-AMPHETAMINE (ADDERALL) 15 MG TABLET dextroamphetamine-amphetamine (ADDERALL) 15 mg tablet       Take 1 tablet by mouth in afternoon    Take 1 tablet by mouth in afternoon    DEXTROAMPHETAMINE-AMPHETAMINE (ADDERALL) 15 MG TABLET dextroamphetamine-amphetamine (ADDERALL) 15 mg tablet       Take 1 tablet by mouth in the afternoon    Take 1 tablet by mouth in the afternoon   Discontinued Medications    PANTOPRAZOLE (PROTONIX) 40 MG TABLET    Take 40 mg by mouth.    TRAMADOL (ULTRAM) 50 MG TABLET    Take 50 mg by mouth every  6 (six) hours as needed.        Return to Clinic: 3 months    Time spent with pt including note preparation: 23 minutes       Anitha Peña, PhD, MP  Advanced Practice Medical Psychologist  [facility] [address] [facility].  [place] [id number] [phone]   [phone] fax

## 2024-08-29 NOTE — PATIENT INSTRUCTIONS

## 2024-12-06 DIAGNOSIS — F41.1 GENERALIZED ANXIETY DISORDER: ICD-10-CM

## 2024-12-06 DIAGNOSIS — F90.0 ATTENTION DEFICIT HYPERACTIVITY DISORDER (ADHD), PREDOMINANTLY INATTENTIVE TYPE: ICD-10-CM

## 2024-12-06 RX ORDER — DEXTROAMPHETAMINE SACCHARATE, AMPHETAMINE ASPARTATE MONOHYDRATE, DEXTROAMPHETAMINE SULFATE AND AMPHETAMINE SULFATE 7.5; 7.5; 7.5; 7.5 MG/1; MG/1; MG/1; MG/1
30 CAPSULE, EXTENDED RELEASE ORAL EVERY MORNING
Qty: 30 CAPSULE | Refills: 0 | OUTPATIENT
Start: 2024-12-06 | End: 2025-01-05

## 2024-12-06 RX ORDER — DEXTROAMPHETAMINE SACCHARATE, AMPHETAMINE ASPARTATE, DEXTROAMPHETAMINE SULFATE AND AMPHETAMINE SULFATE 3.75; 3.75; 3.75; 3.75 MG/1; MG/1; MG/1; MG/1
TABLET ORAL
Qty: 30 TABLET | Refills: 0 | OUTPATIENT
Start: 2024-12-06

## 2024-12-06 RX ORDER — ALPRAZOLAM 1 MG/1
1 TABLET ORAL NIGHTLY
Qty: 30 TABLET | Refills: 0 | Status: SHIPPED | OUTPATIENT
Start: 2024-12-06 | End: 2025-01-05

## 2024-12-11 ENCOUNTER — OFFICE VISIT (OUTPATIENT)
Dept: PSYCHIATRY | Facility: CLINIC | Age: 54
End: 2024-12-11
Payer: COMMERCIAL

## 2024-12-11 DIAGNOSIS — F90.0 ATTENTION DEFICIT HYPERACTIVITY DISORDER (ADHD), PREDOMINANTLY INATTENTIVE TYPE: Primary | ICD-10-CM

## 2024-12-11 DIAGNOSIS — F41.1 GENERALIZED ANXIETY DISORDER: ICD-10-CM

## 2024-12-11 PROCEDURE — 99214 OFFICE O/P EST MOD 30 MIN: CPT | Mod: 95,,, | Performed by: PSYCHOLOGIST

## 2024-12-11 PROCEDURE — 1159F MED LIST DOCD IN RCRD: CPT | Mod: CPTII,95,, | Performed by: PSYCHOLOGIST

## 2024-12-11 RX ORDER — ALPRAZOLAM 1 MG/1
1 TABLET ORAL NIGHTLY
Qty: 30 TABLET | Refills: 2 | Status: SHIPPED | OUTPATIENT
Start: 2024-12-11 | End: 2025-03-11

## 2024-12-11 RX ORDER — DEXTROAMPHETAMINE SACCHARATE, AMPHETAMINE ASPARTATE MONOHYDRATE, DEXTROAMPHETAMINE SULFATE AND AMPHETAMINE SULFATE 7.5; 7.5; 7.5; 7.5 MG/1; MG/1; MG/1; MG/1
30 CAPSULE, EXTENDED RELEASE ORAL EVERY MORNING
Qty: 30 CAPSULE | Refills: 0 | Status: SHIPPED | OUTPATIENT
Start: 2025-01-10 | End: 2025-02-09

## 2024-12-11 RX ORDER — DEXTROAMPHETAMINE SACCHARATE, AMPHETAMINE ASPARTATE MONOHYDRATE, DEXTROAMPHETAMINE SULFATE AND AMPHETAMINE SULFATE 7.5; 7.5; 7.5; 7.5 MG/1; MG/1; MG/1; MG/1
30 CAPSULE, EXTENDED RELEASE ORAL EVERY MORNING
Qty: 30 CAPSULE | Refills: 0 | Status: SHIPPED | OUTPATIENT
Start: 2024-12-11 | End: 2025-01-12

## 2024-12-11 RX ORDER — DEXTROAMPHETAMINE SACCHARATE, AMPHETAMINE ASPARTATE, DEXTROAMPHETAMINE SULFATE AND AMPHETAMINE SULFATE 3.75; 3.75; 3.75; 3.75 MG/1; MG/1; MG/1; MG/1
TABLET ORAL
Qty: 30 TABLET | Refills: 0 | Status: SHIPPED | OUTPATIENT
Start: 2025-02-09 | End: 2025-03-11

## 2024-12-11 RX ORDER — DEXTROAMPHETAMINE SACCHARATE, AMPHETAMINE ASPARTATE, DEXTROAMPHETAMINE SULFATE AND AMPHETAMINE SULFATE 3.75; 3.75; 3.75; 3.75 MG/1; MG/1; MG/1; MG/1
TABLET ORAL
Qty: 30 TABLET | Refills: 0 | Status: SHIPPED | OUTPATIENT
Start: 2025-01-10 | End: 2025-02-09

## 2024-12-11 RX ORDER — DEXTROAMPHETAMINE SACCHARATE, AMPHETAMINE ASPARTATE MONOHYDRATE, DEXTROAMPHETAMINE SULFATE AND AMPHETAMINE SULFATE 7.5; 7.5; 7.5; 7.5 MG/1; MG/1; MG/1; MG/1
30 CAPSULE, EXTENDED RELEASE ORAL EVERY MORNING
Qty: 30 CAPSULE | Refills: 0 | Status: SHIPPED | OUTPATIENT
Start: 2025-02-09 | End: 2025-03-11

## 2024-12-11 RX ORDER — DEXTROAMPHETAMINE SACCHARATE, AMPHETAMINE ASPARTATE, DEXTROAMPHETAMINE SULFATE AND AMPHETAMINE SULFATE 3.75; 3.75; 3.75; 3.75 MG/1; MG/1; MG/1; MG/1
TABLET ORAL
Qty: 30 TABLET | Refills: 0 | Status: SHIPPED | OUTPATIENT
Start: 2024-12-11

## 2024-12-11 NOTE — PROGRESS NOTES
"Outpatient Psychiatry Follow-Up Visit    12/11/2024      Virtual Visit    The patient location is: Patient's clinic/ Patient reported that his/her location at the time of this visit was in the [facility]     Visit type: Virtual visit with synchronous audio and video     Each patient to whom he or she provides medical services by telehealth is: (1) informed of the relationship between the medical psychologist and patient and the respective role of any other health care provider with respect to management of the patient; and (2) notified that he or she may decline to receive medical services by telehealth and may withdraw from such care at any time.    I also informed patient of the following:   Anitha Peña, PhD, MPAP:  [place] medical license number: [id number]    My contact info:  [facility] 2nd Floor  [address] [place] [address]   Ph: [phone]    If technology issues, call office phone: Ph: [phone] or [phone]  If crisis: Dial 911 or go to nearest Emergency Room (ER)  If questions related to privacy practices: contact [facility] Information Department: [phone]    Chief Complaint:  Laura Vora is a 54 y.o. female who presents today for follow-up of anxiety and inattention/distractibility .       LAST VISIT: Laura attended her visit. She is doing well overall. 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 She is doing well with her medicines--we "cleaned up" her list. We are continuing her medicines as noted.    Plan--continue Adderall XR 30 mg and Adderall 15 mg afternoons (sent 3 scripts of each); Xanax 1 mg prn; monitor BP     CURRENT PRESENTATION: Laura attended her virtual visit. She was in the clinic waiting to go into surgery for her hand--had people round her (in an open area) but had earbuds and thought she had enough privacy (and walked away). She did not want to miss her appt. She reported that " "her medicines are the same--no problems with anything.. We agreed to continue as currently prescribed.    Plan--continue Adderall XR 30 mg and Adderall 15 mg afternoons (sent 3 scripts of each); Xanax 1 mg prn;     Lives with apollo ([employer]elt[employer])  Work--[employer] under [employer] (works under Fatou Alcala NP)     reviewed and appropriate fills.    Prior medicines: Lexapro (did not help), Ambien, Trazodone, Xanax ("Xanabar"), Cymbalta, Seroquel, trazodone  ------------------------------------------------------------------------------------------------------------------          12/6/2024    11:25 AM 8/23/2024     9:19 AM 5/27/2024     8:50 AM   GAD7   1. Feeling nervous, anxious, or on edge? 0  0  0    2. Not being able to stop or control worrying? 0  0  0    3. Worrying too much about different things? 0  0  0    4. Trouble relaxing? 1  3  0    5. Being so restless that it is hard to sit still? 1  3  3    6. Becoming easily annoyed or irritable? 1  3  0    7. Feeling afraid as if something awful might happen? 0  0  0    8. If you checked off any problems, how difficult have these problems made it for you to do your work, take care of things at home, or get along with other people? 0  1     CAM-7 Score 3  9 3       Patient-reported     0-4 = Minimal anxiety  5-9 = Mild anxiety  10-14 = Moderate anxiety  15-21 = Severe anxiety         12/9/2021     4:20 PM   Depression Patient Health Questionnaire   Over the last two weeks how often have you been bothered by little interest or pleasure in doing things Not at all   Over the last two weeks how often have you been bothered by feeling down, depressed or hopeless Not at all   PHQ-2 Total Score 0      0-4 = No intervention  5 to 9 = Mild  10 to 14 = Moderate  15 to 19 = Moderately severe  =20 = Severe    Review of Systems   PSYCHIATRIC: Pertinant items are noted in the narrative.    Past Medical, Family and Social History: The patient's past medical, family and " social history have been reviewed and updated as appropriate within the electronic medical record - see encounter notes.    Social History     Socioeconomic History    Marital status: Single   Tobacco Use    Smoking status: Every Day     Current packs/day: 0.25     Types: Cigarettes    Smokeless tobacco: Never   Substance and Sexual Activity    Alcohol use: Yes     Comment: occasionally    Drug use: No         Current Outpatient Medications:     albuterol (PROVENTIL/VENTOLIN HFA) 90 mcg/actuation inhaler, Inhale 2 puffs into the lungs every 4 (four) hours as needed for Wheezing., Disp: 12 g, Rfl: 0    albuterol-ipratropium (DUO-NEB) 2.5 mg-0.5 mg/3 mL nebulizer solution, 3 ml as needed, Disp: , Rfl:     ALPRAZolam (XANAX) 1 MG tablet, Take 1 tablet (1 mg total) by mouth every evening., Disp: 30 tablet, Rfl: 0    amLODIPine (NORVASC) 10 MG tablet, Take 10 mg by mouth every evening., Disp: , Rfl:     azelastine (ASTELIN) 137 mcg (0.1 %) nasal spray, 1 spray (137 mcg total) by Nasal route 2 (two) times daily., Disp: 30 mL, Rfl: 5    cetirizine (ZYRTEC) 10 MG tablet, Take 1 tablet (10 mg total) by mouth once daily., Disp: 30 tablet, Rfl: 1    dextroamphetamine-amphetamine (ADDERALL XR) 30 MG 24 hr capsule, Take 1 capsule (30 mg total) by mouth every morning., Disp: 30 capsule, Rfl: 0    dextroamphetamine-amphetamine (ADDERALL) 15 mg tablet, Take 1 tablet by mouth in the afternoon, Disp: 30 tablet, Rfl: 0    dextroamphetamine-amphetamine (ADDERALL) 15 mg tablet, Take 1 tablet by mouth in the afternoon, Disp: 30 tablet, Rfl: 0    dextroamphetamine-amphetamine (ADDERALL) 15 mg tablet, Take 1 tablet by mouth in the afternoon, Disp: 30 tablet, Rfl: 0    EPINEPHrine (EPIPEN) 0.3 mg/0.3 mL AtIn, Inject 0.3 mLs (0.3 mg total) into the muscle once. for 1 dose, Disp: 2 each, Rfl: 3    fluticasone propionate (FLONASE) 50 mcg/actuation nasal spray, SPRAY 1 SPRAY INTO EACH NOSTRIL TWICE A DAY, Disp: , Rfl:     furosemide (LASIX) 20  "MG tablet, Take 20 mg by mouth., Disp: , Rfl:     indomethacin (INDOCIN) 50 MG capsule, Take 1 capsule by mouth as needed., Disp: , Rfl:     levocetirizine (XYZAL) 5 MG tablet, Take 1 tablet (5 mg total) by mouth every evening., Disp: 30 tablet, Rfl: 11    loratadine (CLARITIN) 10 mg tablet, Take 10 mg by mouth., Disp: , Rfl:     methocarbamoL (ROBAXIN) 500 MG Tab, Take by mouth as needed., Disp: , Rfl:     montelukast (SINGULAIR) 10 mg tablet, Take 1 tablet (10 mg total) by mouth once daily., Disp: 30 tablet, Rfl: 5    omeprazole (PRILOSEC) 40 MG capsule, Take 40 mg by mouth., Disp: , Rfl:     oxybutynin (DITROPAN-XL) 10 MG 24 hr tablet, Take 10 mg by mouth., Disp: , Rfl:     triamcinolone acetonide 0.1% (KENALOG) 0.1 % cream, as needed., Disp: , Rfl:     varenicline (CHANTIX STEPH) 0.5 mg (11)- 1 mg (42) tablet, Take by mouth., Disp: , Rfl:     Compliance: yes    Side effects: see above    Risk Parameters:  Patient reports no suicidal ideation  Patient reports no homicidal ideation  Patient reports no self-injurious behavior  Patient reports no violent behavior    Exam (detailed: at least 9 elements; comprehensive: all 15 elements)   Constitutional  Vitals:  Most recent vital signs were reviewed.   Last 3 sets of Vitals        2/7/2023    11:58 AM 8/30/2023     8:10 AM 8/29/2024     8:48 AM   Vitals - 1 value per visit   SYSTOLIC  138 133   DIASTOLIC  96 92   Pulse  73    Weight (lb) 270 254.19 281.09   Weight (kg) 122.471 115.3 127.5   Height 5' 6" (1.676 m)     BMI (Calculated) 43.6     Pain Score Zero            General:  age appropriate, casually dressed, neatly groomed     Musculoskeletal  Muscle Strength/Tone:  no tremor, no tic   Gait & Station:  video visit     Psychiatric  Speech:  no latency; no press   Behavior: wnl   Mood & Affect:  euthymic  congruent and appropriate   Thought Process:  normal and logical   Associations:  intact   Thought Content:  normal, no suicidality, no homicidality, delusions, or " paranoia   Insight:  intact   Judgement: behavior is adequate to circumstances   Orientation:  grossly intact   Memory: intact for content of interview   Language: grossly intact   Attention Span & Concentration:  Grossly intact   Fund of Knowledge:  intact and appropriate to age and level of education     Assessment and Diagnosis   Status/Progress: Based on the examination today, the patient's problem(s) is/are fairly well controlled.  New problems have been presented today.   Co-morbidities and psychosocial stressors  are complicating management of the primary condition.  There are no active rule-out diagnoses for this patient at this time.     General Impression:     Encounter Diagnoses   Name Primary?    Attention deficit hyperactivity disorder (ADHD), predominantly inattentive type Yes    Generalized anxiety disorder          Intervention/Counseling/Treatment Plan   Medication Management: Discussed risks, benefits, and alternatives to treatment plan documented above with patient. I answered all patient questions related to this plan, and patient expressed understanding and agreement.   continue Adderall XR 30 mg and Adderall 15 mg afternoons (sent 3 scripts of each); Xanax 1 mg prn  Consider counseling      Medication List with Changes/Refills   Current Medications    ALBUTEROL (PROVENTIL/VENTOLIN HFA) 90 MCG/ACTUATION INHALER    Inhale 2 puffs into the lungs every 4 (four) hours as needed for Wheezing.    ALBUTEROL-IPRATROPIUM (DUO-NEB) 2.5 MG-0.5 MG/3 ML NEBULIZER SOLUTION    3 ml as needed    ALPRAZOLAM (XANAX) 1 MG TABLET    Take 1 tablet (1 mg total) by mouth every evening.    AMLODIPINE (NORVASC) 10 MG TABLET    Take 10 mg by mouth every evening.    AZELASTINE (ASTELIN) 137 MCG (0.1 %) NASAL SPRAY    1 spray (137 mcg total) by Nasal route 2 (two) times daily.    CETIRIZINE (ZYRTEC) 10 MG TABLET    Take 1 tablet (10 mg total) by mouth once daily.    DEXTROAMPHETAMINE-AMPHETAMINE (ADDERALL XR) 30 MG 24 HR  CAPSULE    Take 1 capsule (30 mg total) by mouth every morning.    DEXTROAMPHETAMINE-AMPHETAMINE (ADDERALL) 15 MG TABLET    Take 1 tablet by mouth in the afternoon    DEXTROAMPHETAMINE-AMPHETAMINE (ADDERALL) 15 MG TABLET    Take 1 tablet by mouth in the afternoon    DEXTROAMPHETAMINE-AMPHETAMINE (ADDERALL) 15 MG TABLET    Take 1 tablet by mouth in the afternoon    EPINEPHRINE (EPIPEN) 0.3 MG/0.3 ML ATIN    Inject 0.3 mLs (0.3 mg total) into the muscle once. for 1 dose    FLUTICASONE PROPIONATE (FLONASE) 50 MCG/ACTUATION NASAL SPRAY    SPRAY 1 SPRAY INTO EACH NOSTRIL TWICE A DAY    FUROSEMIDE (LASIX) 20 MG TABLET    Take 20 mg by mouth.    INDOMETHACIN (INDOCIN) 50 MG CAPSULE    Take 1 capsule by mouth as needed.    LEVOCETIRIZINE (XYZAL) 5 MG TABLET    Take 1 tablet (5 mg total) by mouth every evening.    LORATADINE (CLARITIN) 10 MG TABLET    Take 10 mg by mouth.    METHOCARBAMOL (ROBAXIN) 500 MG TAB    Take by mouth as needed.    MONTELUKAST (SINGULAIR) 10 MG TABLET    Take 1 tablet (10 mg total) by mouth once daily.    OMEPRAZOLE (PRILOSEC) 40 MG CAPSULE    Take 40 mg by mouth.    OXYBUTYNIN (DITROPAN-XL) 10 MG 24 HR TABLET    Take 10 mg by mouth.    TRIAMCINOLONE ACETONIDE 0.1% (KENALOG) 0.1 % CREAM    as needed.    VARENICLINE (CHANTIX STEPH) 0.5 MG (11)- 1 MG (42) TABLET    Take by mouth.        Return to Clinic: 3 months      I spent a total of 15 minutes on the day of the visit. This includes face to face time and non-face to face time preparing to see the patient (eg, review of tests), obtaining and/or reviewing separately obtained history, documenting clinical information in the electronic or other health record, independently interpreting results and communicating results to the patient/family/caregiver, or care coordinator.          Anitha Peña, PhD, MP  Advanced Practice Medical Psychologist  [facility]  [id number] [place] [address].  [place] [id number]  [phone]   [phone]  fax

## 2024-12-11 NOTE — PATIENT INSTRUCTIONS

## 2025-02-26 ENCOUNTER — OFFICE VISIT (OUTPATIENT)
Dept: PSYCHIATRY | Facility: CLINIC | Age: 55
End: 2025-02-26
Payer: COMMERCIAL

## 2025-02-26 DIAGNOSIS — F41.1 GENERALIZED ANXIETY DISORDER: ICD-10-CM

## 2025-02-26 DIAGNOSIS — F90.0 ATTENTION DEFICIT HYPERACTIVITY DISORDER (ADHD), PREDOMINANTLY INATTENTIVE TYPE: Primary | ICD-10-CM

## 2025-02-26 PROCEDURE — 1159F MED LIST DOCD IN RCRD: CPT | Mod: CPTII,95,, | Performed by: PSYCHOLOGIST

## 2025-02-26 PROCEDURE — 98005 SYNCH AUDIO-VIDEO EST LOW 20: CPT | Mod: 95,,, | Performed by: PSYCHOLOGIST

## 2025-02-26 RX ORDER — DEXTROAMPHETAMINE SACCHARATE, AMPHETAMINE ASPARTATE MONOHYDRATE, DEXTROAMPHETAMINE SULFATE AND AMPHETAMINE SULFATE 7.5; 7.5; 7.5; 7.5 MG/1; MG/1; MG/1; MG/1
30 CAPSULE, EXTENDED RELEASE ORAL EVERY MORNING
Qty: 30 CAPSULE | Refills: 0 | Status: SHIPPED | OUTPATIENT
Start: 2025-03-28 | End: 2025-04-27

## 2025-02-26 RX ORDER — DEXTROAMPHETAMINE SACCHARATE, AMPHETAMINE ASPARTATE MONOHYDRATE, DEXTROAMPHETAMINE SULFATE AND AMPHETAMINE SULFATE 7.5; 7.5; 7.5; 7.5 MG/1; MG/1; MG/1; MG/1
30 CAPSULE, EXTENDED RELEASE ORAL EVERY MORNING
Qty: 30 CAPSULE | Refills: 0 | Status: SHIPPED | OUTPATIENT
Start: 2025-02-26 | End: 2025-03-28

## 2025-02-26 RX ORDER — ALPRAZOLAM 1 MG/1
1 TABLET ORAL NIGHTLY
Qty: 30 TABLET | Refills: 2 | Status: SHIPPED | OUTPATIENT
Start: 2025-02-26 | End: 2025-05-27

## 2025-02-26 RX ORDER — DEXTROAMPHETAMINE SACCHARATE, AMPHETAMINE ASPARTATE, DEXTROAMPHETAMINE SULFATE AND AMPHETAMINE SULFATE 3.75; 3.75; 3.75; 3.75 MG/1; MG/1; MG/1; MG/1
TABLET ORAL
Qty: 30 TABLET | Refills: 0 | Status: SHIPPED | OUTPATIENT
Start: 2025-04-27 | End: 2025-05-27

## 2025-02-26 RX ORDER — DEXTROAMPHETAMINE SACCHARATE, AMPHETAMINE ASPARTATE, DEXTROAMPHETAMINE SULFATE AND AMPHETAMINE SULFATE 3.75; 3.75; 3.75; 3.75 MG/1; MG/1; MG/1; MG/1
TABLET ORAL
Qty: 30 TABLET | Refills: 0 | Status: SHIPPED | OUTPATIENT
Start: 2025-02-26

## 2025-02-26 RX ORDER — DEXTROAMPHETAMINE SACCHARATE, AMPHETAMINE ASPARTATE, DEXTROAMPHETAMINE SULFATE AND AMPHETAMINE SULFATE 3.75; 3.75; 3.75; 3.75 MG/1; MG/1; MG/1; MG/1
TABLET ORAL
Qty: 30 TABLET | Refills: 0 | Status: SHIPPED | OUTPATIENT
Start: 2025-03-28 | End: 2025-04-27

## 2025-02-26 RX ORDER — DEXTROAMPHETAMINE SACCHARATE, AMPHETAMINE ASPARTATE MONOHYDRATE, DEXTROAMPHETAMINE SULFATE AND AMPHETAMINE SULFATE 7.5; 7.5; 7.5; 7.5 MG/1; MG/1; MG/1; MG/1
30 CAPSULE, EXTENDED RELEASE ORAL EVERY MORNING
Qty: 30 CAPSULE | Refills: 0 | Status: SHIPPED | OUTPATIENT
Start: 2025-04-27 | End: 2025-05-27

## 2025-02-26 NOTE — PATIENT INSTRUCTIONS

## 2025-02-26 NOTE — PROGRESS NOTES
Outpatient Psychiatry Follow-Up Visit    2/26/2025    Virtual Visit    The patient location is: Patient's car/ Patient reported that his/her location at the time of this visit was in the [facility]     Visit type: Virtual visit with synchronous audio and video     Each patient to whom he or she provides medical services by telehealth is: (1) informed of the relationship between the medical psychologist and patient and the respective role of any other health care provider with respect to management of the patient; and (2) notified that he or she may decline to receive medical services by telehealth and may withdraw from such care at any time.    I also informed patient of the following:   Anitha Peña, PhD, MPAP:  [place] medical license number: [id number]    My contact info:  [facility] / 2nd Floor  [address] [address] [address]  [place] [address]   Ph: [phone]    If technology issues, call office phone: Ph: [phone] or [phone]  If crisis: Dial 911 or go to nearest Emergency Room (ER)  If questions related to privacy practices: contact [facility] Information Department: [phone]    Preferred Name: Laura  Gender Identity: cis female  Preferred Pronouns: she/her    LAST VISIT: Laura attended her virtual visit. She was in the clinic waiting to go into surgery for her hand--had people round her (in an open area) but had earbuds and thought she had enough privacy (and walked away). She did not want to miss her appt. She reported that her medicines are the same--no problems with anything.. We agreed to continue as currently prescribed.     Plan--continue Adderall XR 30 mg and Adderall 15 mg afternoons (sent 3 scripts of each); Xanax 1 mg prn;     ______________________________________________________________________________  History of Present Illness    CHIEF COMPLAINT:  Laura presents for a follow-up visit for ADHD and anxiety management, last seen in December. We  "had actually met briefly earlier today but had connection problems and were able to reschedule for later this morning.    HPI:  Laura is currently on Adderall XR 30 mg, Adderall 15 mg in the afternoons, and Xanax 1 mg as needed. She reports that these medications are working well, and she is taking them as prescribed. She has been taking Xanax daily recently due to stress related to multiple losses around this time of year of loved ones. Laura looks good and appears to be doing well. She reports her mood as "Good" when asked. She had hand surgery in December, for which she was prescribed pain medication, which she is no longer taking. Laura reports making it through the holidays well, suggesting an improvement or stability in her condition.    MEDICATIONS:  Laura is on Adderall XR 30 mg taken daily and Adderall 15 mg taken in the afternoons, both orally for ADHD. She is also on Xanax 1 mg taken orally as needed for anxiety.     SURGICAL HISTORY:  Laura underwent hand surgery in December 2024.      ROS:  Psychiatric: -mood swings       PSYCHIATRIC: Pertinant items are noted in the narrative.    Past Medical, Family and Social History: The patient's past medical, family and social history have been reviewed and updated as appropriate within the electronic medical record - see encounter notes.     since December 2024.            2/26/2025     7:33 AM 12/6/2024    11:25 AM 8/23/2024     9:19 AM   GAD7   1. Feeling nervous, anxious, or on edge? 0 0 0   2. Not being able to stop or control worrying? 0 0 0   3. Worrying too much about different things? 0 0 0   4. Trouble relaxing? 1 1 3   5. Being so restless that it is hard to sit still? 1 1 3   6. Becoming easily annoyed or irritable? 1 1 3   7. Feeling afraid as if something awful might happen? 0 0 0   8. If you checked off any problems, how difficult have these problems made it for you to do your work, take care of things at home, or get along with other people? 1 0 1 " "  CAM-7 Score 3  3  9       Patient-reported      0-4 = Minimal anxiety  5-9 = Mild anxiety  10-14 = Moderate anxiety  15-21 = Severe anxiety         12/9/2021     4:20 PM   Depression Patient Health Questionnaire   Over the last two weeks how often have you been bothered by little interest or pleasure in doing things Not at all    Over the last two weeks how often have you been bothered by feeling down, depressed or hopeless Not at all    PHQ-2 Total Score 0       Data saved with a previous flowsheet row definition     0-4 = No intervention  5 to 9 = Mild  10 to 14 = Moderate  15 to 19 = Moderately severe  =20 = Severe    Risk Parameters:  Patient reports no suicidal ideation  Patient reports no homicidal ideation  Patient reports no self-injurious behavior  Patient reports no violent behavior    Exam (detailed: at least 9 elements; comprehensive: all 15 elements)   Constitutional  Vitals:  Most recent vital signs were reviewed.   Last 3 sets of Vitals        2/7/2023    11:58 AM 8/30/2023     8:10 AM 8/29/2024     8:48 AM   Vitals - 1 value per visit   SYSTOLIC  138 133   DIASTOLIC  96 92   Pulse  73    Weight (lb) 270 254.19 281.09   Weight (kg) 122.471 115.3 127.5   Height 5' 6" (1.676 m)     BMI (Calculated) 43.6     Pain Score Zero            General:  age appropriate, neatly groomed, scrubs, hair in linda--longer     Musculoskeletal  Muscle Strength/Tone:  no tremor, no tic   Gait & Station:  video visit     Psychiatric  Speech:  no latency; no press   Behavior: wnl   Mood & Affect:  euthymic  congruent and appropriate   Thought Process:  normal and logical   Associations:  intact   Thought Content:  normal, no suicidality, no homicidality, delusions, or paranoia   Insight:  has awareness of illness   Judgement: behavior is adequate to circumstances   Orientation:  grossly intact   Memory: intact for content of interview   Language: grossly intact   Attention Span & Concentration:  Grossly intact   Fund of " Knowledge:  intact and appropriate to age and level of education     General Impression:     Encounter Diagnoses   Name Primary?    Attention deficit hyperactivity disorder (ADHD), predominantly inattentive type Yes    Generalized anxiety disorder        Assessment & Plan    - Evaluated patient's current ADHD and anxiety management  - Assessed effectiveness of current medication regimen (Adderall XR, Adderall IR, Xanax)  - Considered patient's recent stressors and potential impact on medication needs  - Will maintain current medication doses based on patient's reported stability    ATTENTION-DEFICIT HYPERACTIVITY DISORDER:  - Continued Adderall XR 30 mg daily and Adderall IR 15 mg in the afternoons.  - Provided 3 months of prescriptions for Adderall XR and Adderall IR.    ANXIETY DISORDER:  - Discussed potential withdrawal symptoms when not taking Xanax regularly.  - Continued Xanax 1 mg as needed.  - Refilled Xanax prescription to ensure supply until next appointment.    FOLLOW-UP:  - Follow up in 3 months (May).  - Contact the office if needed before next appointment.           I spent a total of 22 minutes on the day of the visit. This includes face to face time and non-face to face time preparing to see the patient (eg, review of tests), obtaining and/or reviewing separately obtained history, documenting clinical information in the electronic or other health record, independently interpreting results and communicating results to the patient/family/caregiver, or care coordinator.        Anitha Peña, PhD, MP  Advanced Practice Medical Psychologist  [facility] [address]  [place] [address]  [phone] ph  [phone] fax    This note was generated with the assistance of ambient listening technology. Verbal consent was obtained by the patient and accompanying visitor(s) for the recording of patient appointment to facilitate this note. I attest to having reviewed and edited  the generated note for accuracy, though some syntax or spelling errors may persist. Please contact the author of this note for any clarification.

## 2025-05-30 ENCOUNTER — PATIENT MESSAGE (OUTPATIENT)
Dept: PSYCHIATRY | Facility: CLINIC | Age: 55
End: 2025-05-30
Payer: COMMERCIAL

## 2025-05-30 DIAGNOSIS — F90.0 ATTENTION DEFICIT HYPERACTIVITY DISORDER (ADHD), PREDOMINANTLY INATTENTIVE TYPE: ICD-10-CM

## 2025-06-02 RX ORDER — DEXTROAMPHETAMINE SACCHARATE, AMPHETAMINE ASPARTATE, DEXTROAMPHETAMINE SULFATE AND AMPHETAMINE SULFATE 3.75; 3.75; 3.75; 3.75 MG/1; MG/1; MG/1; MG/1
TABLET ORAL
Qty: 30 TABLET | Refills: 0 | Status: SHIPPED | OUTPATIENT
Start: 2025-06-02 | End: 2025-07-06

## 2025-06-02 RX ORDER — DEXTROAMPHETAMINE SACCHARATE, AMPHETAMINE ASPARTATE MONOHYDRATE, DEXTROAMPHETAMINE SULFATE AND AMPHETAMINE SULFATE 7.5; 7.5; 7.5; 7.5 MG/1; MG/1; MG/1; MG/1
30 CAPSULE, EXTENDED RELEASE ORAL EVERY MORNING
Qty: 30 CAPSULE | Refills: 0 | Status: SHIPPED | OUTPATIENT
Start: 2025-06-02 | End: 2025-07-06

## 2025-06-06 ENCOUNTER — OFFICE VISIT (OUTPATIENT)
Dept: PSYCHIATRY | Facility: CLINIC | Age: 55
End: 2025-06-06
Payer: COMMERCIAL

## 2025-06-06 DIAGNOSIS — F90.0 ATTENTION DEFICIT HYPERACTIVITY DISORDER (ADHD), PREDOMINANTLY INATTENTIVE TYPE: Primary | ICD-10-CM

## 2025-06-06 DIAGNOSIS — F41.1 GENERALIZED ANXIETY DISORDER: ICD-10-CM

## 2025-06-06 RX ORDER — DEXTROAMPHETAMINE SACCHARATE, AMPHETAMINE ASPARTATE MONOHYDRATE, DEXTROAMPHETAMINE SULFATE AND AMPHETAMINE SULFATE 7.5; 7.5; 7.5; 7.5 MG/1; MG/1; MG/1; MG/1
30 CAPSULE, EXTENDED RELEASE ORAL EVERY MORNING
Qty: 30 CAPSULE | Refills: 0 | Status: SHIPPED | OUTPATIENT
Start: 2025-08-05 | End: 2025-09-04

## 2025-06-06 RX ORDER — DEXTROAMPHETAMINE SACCHARATE, AMPHETAMINE ASPARTATE, DEXTROAMPHETAMINE SULFATE AND AMPHETAMINE SULFATE 3.75; 3.75; 3.75; 3.75 MG/1; MG/1; MG/1; MG/1
TABLET ORAL
Qty: 30 TABLET | Refills: 0 | Status: SHIPPED | OUTPATIENT
Start: 2025-08-05 | End: 2025-09-04

## 2025-06-06 RX ORDER — DEXTROAMPHETAMINE SACCHARATE, AMPHETAMINE ASPARTATE MONOHYDRATE, DEXTROAMPHETAMINE SULFATE AND AMPHETAMINE SULFATE 7.5; 7.5; 7.5; 7.5 MG/1; MG/1; MG/1; MG/1
30 CAPSULE, EXTENDED RELEASE ORAL EVERY MORNING
Qty: 30 CAPSULE | Refills: 0 | Status: SHIPPED | OUTPATIENT
Start: 2025-06-06 | End: 2025-07-06

## 2025-06-06 RX ORDER — DEXTROAMPHETAMINE SACCHARATE, AMPHETAMINE ASPARTATE MONOHYDRATE, DEXTROAMPHETAMINE SULFATE AND AMPHETAMINE SULFATE 7.5; 7.5; 7.5; 7.5 MG/1; MG/1; MG/1; MG/1
30 CAPSULE, EXTENDED RELEASE ORAL EVERY MORNING
Qty: 30 CAPSULE | Refills: 0 | Status: SHIPPED | OUTPATIENT
Start: 2025-07-06 | End: 2025-08-05

## 2025-06-06 RX ORDER — DEXTROAMPHETAMINE SACCHARATE, AMPHETAMINE ASPARTATE, DEXTROAMPHETAMINE SULFATE AND AMPHETAMINE SULFATE 3.75; 3.75; 3.75; 3.75 MG/1; MG/1; MG/1; MG/1
TABLET ORAL
Qty: 30 TABLET | Refills: 0 | Status: SHIPPED | OUTPATIENT
Start: 2025-06-06

## 2025-06-06 RX ORDER — DEXTROAMPHETAMINE SACCHARATE, AMPHETAMINE ASPARTATE, DEXTROAMPHETAMINE SULFATE AND AMPHETAMINE SULFATE 3.75; 3.75; 3.75; 3.75 MG/1; MG/1; MG/1; MG/1
TABLET ORAL
Qty: 30 TABLET | Refills: 0 | Status: SHIPPED | OUTPATIENT
Start: 2025-07-06 | End: 2025-08-05

## 2025-06-06 RX ORDER — ALPRAZOLAM 1 MG/1
1 TABLET ORAL NIGHTLY
Qty: 30 TABLET | Refills: 2 | Status: SHIPPED | OUTPATIENT
Start: 2025-06-06 | End: 2025-09-04

## 2025-08-29 ENCOUNTER — PATIENT MESSAGE (OUTPATIENT)
Dept: PSYCHIATRY | Facility: CLINIC | Age: 55
End: 2025-08-29
Payer: COMMERCIAL

## 2025-08-29 DIAGNOSIS — F41.1 GENERALIZED ANXIETY DISORDER: ICD-10-CM

## 2025-08-29 RX ORDER — ALPRAZOLAM 1 MG/1
1 TABLET ORAL NIGHTLY
Qty: 30 TABLET | Refills: 0 | Status: SHIPPED | OUTPATIENT
Start: 2025-08-29 | End: 2025-09-28

## (undated) DEVICE — SUT VICRYL PLUS 3-0 PS2 18

## (undated) DEVICE — BANDAGE MATRIX HK LOOP 6IN 5YD

## (undated) DEVICE — PAD ABD 8X10 STERILE

## (undated) DEVICE — BNDG COFLEX FOAM LF2 ST 3X5YD

## (undated) DEVICE — BLADE SURG #15 CARBON STEEL

## (undated) DEVICE — DRAPE THREE-QTR REINF 53X77IN

## (undated) DEVICE — MANIFOLD 4 PORT

## (undated) DEVICE — PAD CAST SPECIALIST STRL 4

## (undated) DEVICE — PACK BASIC SETUP SC BR

## (undated) DEVICE — SUT VICRYL 3-0 27 CT-1

## (undated) DEVICE — SUT ETHILON 3-0 PS2 18 BLK

## (undated) DEVICE — SUT 4-0 ETHILON 18 PS-2

## (undated) DEVICE — BANDAGE SOFFORM STER 2IN

## (undated) DEVICE — COVER LIGHT HANDLE 80/CA

## (undated) DEVICE — SUT PROLENE 3-0 PS-2 BL 18IN

## (undated) DEVICE — DRAPE U SPLIT SHEET 54X76IN

## (undated) DEVICE — GLOVE SURG BIOGEL LATEX SZ 7.5

## (undated) DEVICE — PAD CAST SPECIALIST STRL 6

## (undated) DEVICE — SUT ETHICON 3-0 BLK MONO PS

## (undated) DEVICE — DRAPE MOBILE C-ARM

## (undated) DEVICE — DRESSING SPONGE 16PLY 4X4 NS

## (undated) DEVICE — DRESSING N ADH OIL EMUL 3X3

## (undated) DEVICE — DRAPE T EXTRM SURG 121X128X90

## (undated) DEVICE — SPONGE DERMACEA GAUZE 4X4

## (undated) DEVICE — APPLICATOR CHLORAPREP ORN 26ML

## (undated) DEVICE — PAD ADULT ELECTRODE GROUNDING

## (undated) DEVICE — UNDERGLOVES BIOGEL PI SIZE 8

## (undated) DEVICE — SYR 10CC LUER LOCK

## (undated) DEVICE — SUT VICRYL 2-0 36 CT-1

## (undated) DEVICE — BLADE SURG CARBON STEEL #10

## (undated) DEVICE — BANDAGE MATRIX HK LOOP 4IN 5YD

## (undated) DEVICE — BLADE LONG 31.0MM X 9.0MM

## (undated) DEVICE — NDL SAFETY 25G X 1.5 ECLIPSE

## (undated) DEVICE — PAD UNDERPAD 30X30

## (undated) DEVICE — SUT VICRYL PLUS 0 CT1 18IN

## (undated) DEVICE — DRAPE SURG W/TWL 17 5/8X23

## (undated) DEVICE — SHOE POST OP FEMALE LG BLACK